# Patient Record
Sex: FEMALE | Race: BLACK OR AFRICAN AMERICAN | NOT HISPANIC OR LATINO | Employment: OTHER | URBAN - METROPOLITAN AREA
[De-identification: names, ages, dates, MRNs, and addresses within clinical notes are randomized per-mention and may not be internally consistent; named-entity substitution may affect disease eponyms.]

---

## 2017-01-15 ENCOUNTER — HOSPITAL ENCOUNTER (EMERGENCY)
Facility: HOSPITAL | Age: 47
Discharge: HOME | End: 2017-01-16
Attending: EMERGENCY MEDICINE
Payer: MEDICARE

## 2017-01-15 DIAGNOSIS — Z51.89 VISIT FOR WOUND CHECK: Primary | ICD-10-CM

## 2017-01-15 PROCEDURE — 99283 EMERGENCY DEPT VISIT LOW MDM: CPT

## 2017-01-15 NOTE — ED AVS SNAPSHOT
[facility]  [address] [facility] [address]               Karine Nayak   1/15/2017 11:41 PM   ED    Description:  Female : 1970   Department:  [facility]           Your Care was Coordinated By:     Provider Role From To    Cecil Burgos MD Attending Provider 01/15/17 2437 --      Reason for Visit     chest tube came out           Diagnoses this Visit        Comments    Visit for wound check    -  Primary       ED Disposition     ED Disposition Condition Comment    Discharge             To Do List           Follow-up Information     Schedule an appointment as soon as possible for a visit with Malinda Byrd NP.    Specialty:  Family Medicine    Why:  Follow up with surgery regarding drainage tube      Contact information:    [address]  [facility] [address]  [phone]        [facility]     [facility] Care [facility] -  Assistance  Registered nurses in the [facility] provide clinical advisement, health education, appointment booking, and other advisory services.  Call for this free service at [phone].             Medications           Message regarding Medications     Verify the changes and/or additions to your medication regime listed below are the same as discussed with your clinician today.  If any of these changes or additions are incorrect, please notify your healthcare provider.             Verify that the below list of medications is an accurate representation of the medications you are currently taking.  If none reported, the list may be blank. If incorrect, please contact your healthcare provider. Carry this list with you in case of emergency.           Current Medications     acetaminophen-codeine 300-60mg (TYLENOL #4) 300-60 mg Tab TAKE ONE TABLET BY MOUTH THREE TIMES DAILY AS NEEDED    alprazolam (XANAX) 1 MG tablet Take 1 tablet (1 mg total) by mouth every 8 (eight) hours.    aripiprazole  "(ABILIFY) 10 MG Tab TAKE ONE TABLET BY MOUTH ONCE A DAY    atorvastatin (LIPITOR) 20 MG tablet Take 20 mg by mouth once daily.    BENICAR HCT 40-25 mg per tablet Take 1 tablet by mouth once daily.    duloxetine (CYMBALTA) 60 MG capsule TAKE ONE CAPSULE BY MOUTH ONCE A DAY    gabapentin (NEURONTIN) 300 MG capsule Take 1 capsule (300 mg total) by mouth every evening.    iron ps cmplx-folic acid-vit C (NOVAFERRUM) 100-1-60 mg/5 mL SolR Take 5 mLs by mouth once daily.    levocetirizine (XYZAL) 5 MG tablet Take 1 tablet by mouth once daily.    meloxicam (MOBIC) 15 MG tablet TAKE ONE TABLET BY MOUTH DAILY    spironolactone (ALDACTONE) 50 MG tablet Take 1 tablet by mouth once daily.    tizanidine (ZANAFLEX) 4 MG tablet TAKE ONE TABLET BY MOUTH EVERY 8 HOURS           Clinical Reference Information           Your Vitals Were     BP Pulse Temp Resp Height Weight    125/61 (BP Location: Left arm, Patient Position: Sitting) 103 97.5 °F (36.4 °C) (Oral) 20 5' 8" (1.727 m) 143.3 kg (316 lb)    Last Period SpO2 BMI          01/30/2013 100% 48.05 kg/m2        Allergies as of 1/16/2017     No Known Allergies      Immunizations Administered on Date of Encounter - 1/16/2017     None      ED Micro, Lab, POCT     None      ED Imaging Orders     Start Ordered       Status Ordering Provider    01/15/17 2346 01/15/17 2345  X-Ray Chest PA And Lateral  1 time imaging      In process         Discharge Instructions         Wound Care    You have a break in the skin. This wound may be because of an injury. Or it may be the result of surgery. Closing the wound helps stop bleeding, protects the wound from infection, and speeds healing. The type of closure that is used depends on the size and location of the wound. Choices include stitches (sutures), strips of surgical tape, skin glue, or staples.  Home care  Your healthcare provider may prescribe medicines for pain. Or he or she may suggest an over-the-counter (OTC) pain reliever, such as " ibuprofen. If you have chronic kidney disease, talk with your provider before taking any OTC medicines. Also talk with your provider if you've had a stomach ulcer or gastrointestinal bleeding. In certain cases, antibiotics may be prescribed to help prevent infection. If antibiotics are prescribed, take them exactly as directed for as long as directed. Do not stop taking your antibiotics until they are all gone, even if you feel better.  General care  · Follow the healthcare providers instructions on how to care for the wound.  · Wash your hands with soap and warm water before and after caring for the wound. This helps prevent infection.  · If a bandage was applied, change it once a day or as directed. If at any time the bandage becomes wet or dirty, replace it with a new one.  · Unless told otherwise, avoid soaking the wound in water. Take showers or sponge baths instead of tub baths. Don't scrub or pick at the wound.  · Don't go swimming.  · If you have a bandage and it gets wet, use a clean cloth to gently pat the wound dry. Then replace the bandage with a dry one.  · Don't scratch, rub, or pick at the area.  · Watch for the signs of infection listed below. Any wound can get infected, even if you are taking antibiotics. Seek care right away if you see any possible signs of infection.  Care for specific closures  · Sutures. You may want to clean the wound daily after the first 2 to 3 days. To do this, remove the bandage and gently wash the area with soap and warm water. After cleaning, apply a thin layer of antibiotic ointment if recommended. Then apply a new bandage. Sutures on the outside of the skin usually need to be removed by your healthcare provider.  · Surgical tape. Keep the area dry. If it gets wet, blot it dry with a towel. Surgical tape closures usually fall off within 7 to 10 days. If they have not fallen off after 10 days, you can remove them yourself. To remove the tape, use mineral oil or petroleum  jelly on a cotton ball to gently rub the adhesive.  · Skin glue. You may shower or bathe as usual, but do not use soaps, lotions, or ointments on the wound area. Do not scrub the wound. After bathing, pat the wound dry with a soft towel. Do not apply liquids like peroxide, ointments, or creams to the wound while the strips or film is in place. Don't scratch, rub, or pick at the strips or film. Don't put tape directly over the strips or film. Skin adhesive film will fall off naturally in 5 to 10 days. If it does not peel off in 10 days, gently rub petroleum jelly or an ointment onto the film.  · Staples. Take showers or sponge baths. Don't take tub baths. Don't use lotions on the wound area. The area may be cleaned with soap and water 2 to 3 days after the wound was stapled. Don't scrub the wound. Pat it dry with a clean soft cloth or towel. You can use antibiotic ointment if your provider tells you to. Staples will need to be removed by your healthcare provider in 10 to 14 days.  Follow-up care  Follow up with your healthcare provider, or as directed. If you have sutures or staples, return for their removal as directed.  When to seek medical advice  Call your healthcare provider right away if you have signs of infection:  · Fever of 100.4°F (38°C) or higher, or as directed by your healthcare provider  · Increasing pain in the wound  · Increasing redness or swelling  · Pus or bad-smelling drainage from the wound  Also call your provider right away if any of these occur:  · Wound bleeds more than a small amount or wont stop bleeding  · Wound edges come apart  · Numbness or weakness in the wound area that doesnt go away  © 3781-4152 [employer]. [address] [place] [address], [place] [address]. All rights reserved. This information is not intended as a substitute for professional medical care. Always follow your healthcare professional's instructions.          [facility] Sign-Up     Activating your [facility]  account is as easy as 1-2-3!     1) Visit [email or web address], select Sign Up Now, enter this activation code and your date of birth, then select Next.  [email or web address]  Expires: 3/2/2017 12:11 AM      2) Create a username and password to use when you visit [facility] in the future and select a security question in case you lose your password and select Next.    3) Enter your e-mail address and click Sign Up!    Additional Information  If you have questions, please e-mail [email or web address] or call [phone] to talk to our [facility] staff. Remember, [facility] is NOT to be used for urgent needs. For medical emergencies, dial 911.         Smoking Cessation     If you would like to quit smoking:   You may be eligible for free services if you are a [place] resident and started smoking cigarettes before September 1, 1988.  Call the Smoking Cessation [employer] ([facility]) toll free at (714) 166-8619 or (530) 864-9088.   Call 0-024-QUIT-NOW if you do not meet the above criteria.             [facility] complies with applicable Federal civil rights laws and does not discriminate on the basis of race, color, national origin, age, disability, or sex.        Language Assistance Services     ATTENTION: Language assistance services are available, free of charge. Please call [phone].      ATENCIÓN: Si habla español, tiene a maria disposición servicios gratuitos de asistencia lingüística. Llame al [phone].     CHÚ Ý: N?u b?n nói Ti?ng Vi?t, có các d?ch v? h? tr? ngôn ng? mi?n phí dành cho b?n. G?i s? [phone].

## 2017-01-16 VITALS
HEART RATE: 89 BPM | SYSTOLIC BLOOD PRESSURE: 128 MMHG | BODY MASS INDEX: 44.41 KG/M2 | TEMPERATURE: 97 F | RESPIRATION RATE: 18 BRPM | DIASTOLIC BLOOD PRESSURE: 68 MMHG | HEIGHT: 68 IN | OXYGEN SATURATION: 100 % | WEIGHT: 293 LBS

## 2017-01-16 NOTE — DISCHARGE INSTRUCTIONS
Wound Care    You have a break in the skin. This wound may be because of an injury. Or it may be the result of surgery. Closing the wound helps stop bleeding, protects the wound from infection, and speeds healing. The type of closure that is used depends on the size and location of the wound. Choices include stitches (sutures), strips of surgical tape, skin glue, or staples.  Home care  Your healthcare provider may prescribe medicines for pain. Or he or she may suggest an over-the-counter (OTC) pain reliever, such as ibuprofen. If you have chronic kidney disease, talk with your provider before taking any OTC medicines. Also talk with your provider if you've had a stomach ulcer or gastrointestinal bleeding. In certain cases, antibiotics may be prescribed to help prevent infection. If antibiotics are prescribed, take them exactly as directed for as long as directed. Do not stop taking your antibiotics until they are all gone, even if you feel better.  General care  · Follow the healthcare providers instructions on how to care for the wound.  · Wash your hands with soap and warm water before and after caring for the wound. This helps prevent infection.  · If a bandage was applied, change it once a day or as directed. If at any time the bandage becomes wet or dirty, replace it with a new one.  · Unless told otherwise, avoid soaking the wound in water. Take showers or sponge baths instead of tub baths. Don't scrub or pick at the wound.  · Don't go swimming.  · If you have a bandage and it gets wet, use a clean cloth to gently pat the wound dry. Then replace the bandage with a dry one.  · Don't scratch, rub, or pick at the area.  · Watch for the signs of infection listed below. Any wound can get infected, even if you are taking antibiotics. Seek care right away if you see any possible signs of infection.  Care for specific closures  · Sutures. You may want to clean the wound daily after the first 2 to 3 days. To do  this, remove the bandage and gently wash the area with soap and warm water. After cleaning, apply a thin layer of antibiotic ointment if recommended. Then apply a new bandage. Sutures on the outside of the skin usually need to be removed by your healthcare provider.  · Surgical tape. Keep the area dry. If it gets wet, blot it dry with a towel. Surgical tape closures usually fall off within 7 to 10 days. If they have not fallen off after 10 days, you can remove them yourself. To remove the tape, use mineral oil or petroleum jelly on a cotton ball to gently rub the adhesive.  · Skin glue. You may shower or bathe as usual, but do not use soaps, lotions, or ointments on the wound area. Do not scrub the wound. After bathing, pat the wound dry with a soft towel. Do not apply liquids like peroxide, ointments, or creams to the wound while the strips or film is in place. Don't scratch, rub, or pick at the strips or film. Don't put tape directly over the strips or film. Skin adhesive film will fall off naturally in 5 to 10 days. If it does not peel off in 10 days, gently rub petroleum jelly or an ointment onto the film.  · Staples. Take showers or sponge baths. Don't take tub baths. Don't use lotions on the wound area. The area may be cleaned with soap and water 2 to 3 days after the wound was stapled. Don't scrub the wound. Pat it dry with a clean soft cloth or towel. You can use antibiotic ointment if your provider tells you to. Staples will need to be removed by your healthcare provider in 10 to 14 days.  Follow-up care  Follow up with your healthcare provider, or as directed. If you have sutures or staples, return for their removal as directed.  When to seek medical advice  Call your healthcare provider right away if you have signs of infection:  · Fever of 100.4°F (38°C) or higher, or as directed by your healthcare provider  · Increasing pain in the wound  · Increasing redness or swelling  · Pus or bad-smelling drainage  from the wound  Also call your provider right away if any of these occur:  · Wound bleeds more than a small amount or wont stop bleeding  · Wound edges come apart  · Numbness or weakness in the wound area that doesnt go away  © 9245-4748 [employer]. [address] [place] [address]. All rights reserved. This information is not intended as a substitute for professional medical care. Always follow your healthcare professional's instructions.

## 2017-01-16 NOTE — ED PROVIDER NOTES
Encounter Date: 1/15/2017       History     Chief Complaint   Patient presents with    chest tube came out     Left chest wall x 1 hours ago; denies SOB     Review of patient's allergies indicates:  No Known Allergies  HPI Comments: Patient states drainage tube accidental was removed.    Patient is a 46 y.o. female presenting with the following complaint: injury. The history is provided by the patient.   General Injury    The incident occurred just prior to arrival. The incident occurred at home. The injury mechanism is unknown. She came to the ER via by private vehicle. Pertinent negatives include no chest pain, no abdominal pain, no nausea, no vomiting, no weakness and no cough.     Past Medical History   Diagnosis Date    Anxiety     Fibromyalgia     Hyperlipidemia     Hypertension     OCD (obsessive compulsive disorder)     Sycosis      No past medical history pertinent negatives.  Past Surgical History   Procedure Laterality Date    Tubal ligation      Tonsillectomy      Hysterectomy      Bladder lift      Knee surgery Right      Family History   Problem Relation Age of Onset    Hypertension Mother     Mental illness Father     COPD Father     Cancer Father      Social History   Substance Use Topics    Smoking status: Current Every Day Smoker     Packs/day: 0.50     Types: Cigarettes    Smokeless tobacco: Never Used    Alcohol use No     Review of Systems   Constitutional: Negative for fever.   HENT: Negative for ear discharge, ear pain and facial swelling.    Eyes: Negative for pain, discharge, redness and itching.   Respiratory: Negative for cough, shortness of breath, wheezing and stridor.    Cardiovascular: Negative for chest pain, palpitations and leg swelling.   Gastrointestinal: Negative for abdominal pain, nausea and vomiting.   Genitourinary: Negative for flank pain and frequency.   Musculoskeletal: Negative for arthralgias, back pain and gait problem.   Neurological: Negative for  tremors, syncope, speech difficulty and weakness.       Physical Exam   Initial Vitals   BP Pulse Resp Temp SpO2   01/15/17 2334 01/15/17 2334 01/15/17 2334 01/15/17 2334 01/15/17 2334   125/61 103 20 97.5 °F (36.4 °C) 100 %     Physical Exam    Nursing note and vitals reviewed.  Constitutional: She appears well-developed and well-nourished. She is not diaphoretic. No distress.   HENT:   Head: Normocephalic and atraumatic.   Mouth/Throat: No oropharyngeal exudate.   Eyes: Conjunctivae and EOM are normal. Pupils are equal, round, and reactive to light. Right eye exhibits no discharge. Left eye exhibits no discharge. No scleral icterus.   Neck: Normal range of motion. Neck supple. No JVD present.   Cardiovascular: Normal rate, regular rhythm and normal heart sounds. Exam reveals no friction rub.    Pulmonary/Chest: Breath sounds normal. No stridor. No respiratory distress. She has no wheezes. She has no rhonchi. She has no rales.   Abdominal: Soft. Bowel sounds are normal. She exhibits no distension. There is no tenderness. There is no rebound and no guarding.   Musculoskeletal: Normal range of motion. She exhibits no edema or tenderness.   Neurological: She is alert and oriented to person, place, and time. She has normal strength and normal reflexes. No sensory deficit.         ED Course   Procedures  Labs Reviewed - No data to display                            ED Course     Clinical Impression:   The encounter diagnosis was Visit for wound check.    Disposition:   Disposition: Discharged  Condition: Stable       Cecil Burgos MD  01/16/17 0007

## 2017-03-08 ENCOUNTER — LAB VISIT (OUTPATIENT)
Dept: LAB | Facility: HOSPITAL | Age: 47
End: 2017-03-08
Attending: INTERNAL MEDICINE
Payer: MEDICARE

## 2017-03-08 DIAGNOSIS — N17.9 ACUTE KIDNEY FAILURE, UNSPECIFIED: Primary | ICD-10-CM

## 2017-03-08 DIAGNOSIS — E55.9 UNSPECIFIED VITAMIN D DEFICIENCY: ICD-10-CM

## 2017-03-08 LAB
ALBUMIN SERPL BCP-MCNC: 3.1 G/DL
ALP SERPL-CCNC: 64 U/L
ALT SERPL W/O P-5'-P-CCNC: 13 U/L
ANION GAP SERPL CALC-SCNC: 12 MMOL/L
AST SERPL-CCNC: 10 U/L
BASOPHILS # BLD AUTO: 0.03 K/UL
BASOPHILS NFR BLD: 0.3 %
BILIRUB SERPL-MCNC: 0.6 MG/DL
BUN SERPL-MCNC: 19 MG/DL
CALCIUM SERPL-MCNC: 10.6 MG/DL
CHLORIDE SERPL-SCNC: 101 MMOL/L
CO2 SERPL-SCNC: 24 MMOL/L
CREAT SERPL-MCNC: 1.5 MG/DL
DIFFERENTIAL METHOD: ABNORMAL
EOSINOPHIL # BLD AUTO: 0.1 K/UL
EOSINOPHIL NFR BLD: 1 %
ERYTHROCYTE [DISTWIDTH] IN BLOOD BY AUTOMATED COUNT: 16.8 %
EST. GFR  (AFRICAN AMERICAN): 48 ML/MIN/1.73 M^2
EST. GFR  (NON AFRICAN AMERICAN): 41 ML/MIN/1.73 M^2
GLUCOSE SERPL-MCNC: 105 MG/DL
HCT VFR BLD AUTO: 30.3 %
HGB BLD-MCNC: 10.1 G/DL
LYMPHOCYTES # BLD AUTO: 2.9 K/UL
LYMPHOCYTES NFR BLD: 29.3 %
MCH RBC QN AUTO: 26.4 PG
MCHC RBC AUTO-ENTMCNC: 33.3 %
MCV RBC AUTO: 79 FL
MONOCYTES # BLD AUTO: 0.7 K/UL
MONOCYTES NFR BLD: 7.2 %
NEUTROPHILS # BLD AUTO: 6.1 K/UL
NEUTROPHILS NFR BLD: 62.2 %
PHOSPHATE SERPL-MCNC: 3.3 MG/DL
PLATELET # BLD AUTO: 455 K/UL
PMV BLD AUTO: 9.7 FL
POTASSIUM SERPL-SCNC: 4.2 MMOL/L
PROT SERPL-MCNC: 8.5 G/DL
RBC # BLD AUTO: 3.82 M/UL
SODIUM SERPL-SCNC: 137 MMOL/L
WBC # BLD AUTO: 9.87 K/UL

## 2017-03-08 PROCEDURE — 84100 ASSAY OF PHOSPHORUS: CPT

## 2017-03-08 PROCEDURE — 83970 ASSAY OF PARATHORMONE: CPT

## 2017-03-08 PROCEDURE — 82652 VIT D 1 25-DIHYDROXY: CPT

## 2017-03-08 PROCEDURE — 36415 COLL VENOUS BLD VENIPUNCTURE: CPT

## 2017-03-08 PROCEDURE — 85025 COMPLETE CBC W/AUTO DIFF WBC: CPT

## 2017-03-08 PROCEDURE — 80053 COMPREHEN METABOLIC PANEL: CPT

## 2017-03-09 LAB — PTH-INTACT SERPL-MCNC: 36 PG/ML

## 2017-03-13 LAB — 1,25(OH)2D3 SERPL-MCNC: 21 PG/ML

## 2017-06-14 ENCOUNTER — LAB VISIT (OUTPATIENT)
Dept: LAB | Facility: HOSPITAL | Age: 47
End: 2017-06-14
Attending: INTERNAL MEDICINE
Payer: MEDICARE

## 2017-06-14 DIAGNOSIS — N17.9 ACUTE KIDNEY FAILURE, UNSPECIFIED: Primary | ICD-10-CM

## 2017-06-14 DIAGNOSIS — E55.9 VITAMIN D DEFICIENCY: ICD-10-CM

## 2017-06-14 PROBLEM — J92.9 PLEURAL THICKENING: Status: ACTIVE | Noted: 2017-06-14

## 2017-06-14 PROBLEM — M79.2 NEUROPATHIC PAIN OF CHEST: Status: ACTIVE | Noted: 2017-06-14

## 2017-06-14 PROBLEM — R07.81 PLEURITIC CHEST PAIN: Status: ACTIVE | Noted: 2017-06-14

## 2017-06-14 LAB
25(OH)D3+25(OH)D2 SERPL-MCNC: 15 NG/ML
ALBUMIN SERPL BCP-MCNC: 3.5 G/DL
ALP SERPL-CCNC: 71 U/L
ALT SERPL W/O P-5'-P-CCNC: 22 U/L
ANION GAP SERPL CALC-SCNC: 7 MMOL/L
AST SERPL-CCNC: 14 U/L
BASOPHILS # BLD AUTO: 0.02 K/UL
BASOPHILS NFR BLD: 0.2 %
BILIRUB SERPL-MCNC: 0.2 MG/DL
BILIRUB UR QL STRIP: NEGATIVE
BUN SERPL-MCNC: 26 MG/DL
CALCIUM SERPL-MCNC: 9.5 MG/DL
CHLORIDE SERPL-SCNC: 109 MMOL/L
CLARITY UR: CLEAR
CO2 SERPL-SCNC: 23 MMOL/L
COLOR UR: YELLOW
CREAT SERPL-MCNC: 1.3 MG/DL
DIFFERENTIAL METHOD: ABNORMAL
EOSINOPHIL # BLD AUTO: 0.3 K/UL
EOSINOPHIL NFR BLD: 2.6 %
ERYTHROCYTE [DISTWIDTH] IN BLOOD BY AUTOMATED COUNT: 13.9 %
EST. GFR  (AFRICAN AMERICAN): 57 ML/MIN/1.73 M^2
EST. GFR  (NON AFRICAN AMERICAN): 49 ML/MIN/1.73 M^2
GLUCOSE SERPL-MCNC: 87 MG/DL
GLUCOSE UR QL STRIP: NEGATIVE
HCT VFR BLD AUTO: 31.5 %
HGB BLD-MCNC: 10.6 G/DL
HGB UR QL STRIP: NEGATIVE
KETONES UR QL STRIP: NEGATIVE
LEUKOCYTE ESTERASE UR QL STRIP: NEGATIVE
LYMPHOCYTES # BLD AUTO: 2.9 K/UL
LYMPHOCYTES NFR BLD: 30.2 %
MCH RBC QN AUTO: 28.1 PG
MCHC RBC AUTO-ENTMCNC: 33.7 %
MCV RBC AUTO: 84 FL
MONOCYTES # BLD AUTO: 0.5 K/UL
MONOCYTES NFR BLD: 4.8 %
NEUTROPHILS # BLD AUTO: 6 K/UL
NEUTROPHILS NFR BLD: 62.2 %
NITRITE UR QL STRIP: NEGATIVE
PH UR STRIP: 6 [PH] (ref 5–8)
PLATELET # BLD AUTO: 373 K/UL
PMV BLD AUTO: 9.5 FL
POTASSIUM SERPL-SCNC: 4.1 MMOL/L
PROT SERPL-MCNC: 7.9 G/DL
PROT UR QL STRIP: NEGATIVE
RBC # BLD AUTO: 3.77 M/UL
SODIUM SERPL-SCNC: 139 MMOL/L
SP GR UR STRIP: 1.01 (ref 1–1.03)
URN SPEC COLLECT METH UR: NORMAL
UROBILINOGEN UR STRIP-ACNC: NEGATIVE EU/DL
WBC # BLD AUTO: 9.71 K/UL

## 2017-06-14 PROCEDURE — 85025 COMPLETE CBC W/AUTO DIFF WBC: CPT

## 2017-06-14 PROCEDURE — 82306 VITAMIN D 25 HYDROXY: CPT

## 2017-06-14 PROCEDURE — 80053 COMPREHEN METABOLIC PANEL: CPT

## 2017-06-14 PROCEDURE — 36415 COLL VENOUS BLD VENIPUNCTURE: CPT

## 2017-06-14 PROCEDURE — 81003 URINALYSIS AUTO W/O SCOPE: CPT

## 2017-12-13 ENCOUNTER — TELEPHONE (OUTPATIENT)
Dept: BARIATRICS | Facility: CLINIC | Age: 47
End: 2017-12-13

## 2017-12-13 NOTE — TELEPHONE ENCOUNTER
Spoke to patient: she states that she a gastric sleeve and is gaining weight.  She would to be considered for a revision.  Advised to get outside records from Dr Estevez.  She states that she had a complication after surgery and was in the hospital for 6 weeks.  Asked her to obtain these records and bring to her first appointment.  Appt made and slip mailed to patient.  All questions answered.

## 2017-12-13 NOTE — TELEPHONE ENCOUNTER
----- Message from Joana Chavez sent at 12/13/2017 10:56 AM CST -----  Contact: self   Hope States that she needs a call returned by a nurse in reference to having a revision surgery ,  Please call Karine @ [phone] . Thanks :)

## 2018-01-16 ENCOUNTER — TELEPHONE (OUTPATIENT)
Dept: BARIATRICS | Facility: CLINIC | Age: 48
End: 2018-01-16

## 2018-01-16 NOTE — TELEPHONE ENCOUNTER
Rescheduled patient with ARPAN Lopez for 2/8/2018 at 1120am.  Patient confirmed date and time. (Rescheduled due to ARPAN Lopez out for family emergency)

## 2018-02-08 ENCOUNTER — OFFICE VISIT (OUTPATIENT)
Dept: BARIATRICS | Facility: CLINIC | Age: 48
End: 2018-02-08
Payer: MEDICARE

## 2018-02-08 ENCOUNTER — TELEPHONE (OUTPATIENT)
Dept: BARIATRICS | Facility: CLINIC | Age: 48
End: 2018-02-08

## 2018-02-08 VITALS
HEIGHT: 68 IN | HEART RATE: 115 BPM | DIASTOLIC BLOOD PRESSURE: 72 MMHG | SYSTOLIC BLOOD PRESSURE: 130 MMHG | WEIGHT: 293 LBS | BODY MASS INDEX: 44.41 KG/M2

## 2018-02-08 DIAGNOSIS — F42.9 OBSESSIVE-COMPULSIVE DISORDER, UNSPECIFIED TYPE: ICD-10-CM

## 2018-02-08 DIAGNOSIS — F41.9 ANXIETY: ICD-10-CM

## 2018-02-08 DIAGNOSIS — M79.7 FIBROMYALGIA: ICD-10-CM

## 2018-02-08 DIAGNOSIS — Z98.84 S/P LAPAROSCOPIC SLEEVE GASTRECTOMY: ICD-10-CM

## 2018-02-08 DIAGNOSIS — R13.10 DYSPHAGIA, UNSPECIFIED TYPE: ICD-10-CM

## 2018-02-08 DIAGNOSIS — E78.5 HYPERLIPIDEMIA, UNSPECIFIED HYPERLIPIDEMIA TYPE: ICD-10-CM

## 2018-02-08 DIAGNOSIS — G47.33 OSA ON CPAP: ICD-10-CM

## 2018-02-08 DIAGNOSIS — R12 HEARTBURN: ICD-10-CM

## 2018-02-08 DIAGNOSIS — R63.5 WEIGHT GAIN: ICD-10-CM

## 2018-02-08 DIAGNOSIS — E66.01 MORBID OBESITY WITH BODY MASS INDEX OF 45.0-49.9 IN ADULT: ICD-10-CM

## 2018-02-08 DIAGNOSIS — E46 PROTEIN-CALORIE MALNUTRITION, UNSPECIFIED SEVERITY: ICD-10-CM

## 2018-02-08 DIAGNOSIS — Z86.2 HX OF IRON DEFICIENCY ANEMIA: ICD-10-CM

## 2018-02-08 DIAGNOSIS — E63.9: ICD-10-CM

## 2018-02-08 DIAGNOSIS — E66.01 MORBID OBESITY DUE TO EXCESS CALORIES: ICD-10-CM

## 2018-02-08 DIAGNOSIS — I10 ESSENTIAL HYPERTENSION: ICD-10-CM

## 2018-02-08 DIAGNOSIS — K21.9 GASTROESOPHAGEAL REFLUX DISEASE, ESOPHAGITIS PRESENCE NOT SPECIFIED: Primary | ICD-10-CM

## 2018-02-08 PROCEDURE — 99205 OFFICE O/P NEW HI 60 MIN: CPT | Mod: S$PBB,,, | Performed by: PHYSICIAN ASSISTANT

## 2018-02-08 PROCEDURE — 99999 PR PBB SHADOW E&M-EST. PATIENT-LVL IV: CPT | Mod: PBBFAC,,, | Performed by: PHYSICIAN ASSISTANT

## 2018-02-08 PROCEDURE — 99214 OFFICE O/P EST MOD 30 MIN: CPT | Mod: PBBFAC | Performed by: PHYSICIAN ASSISTANT

## 2018-02-08 RX ORDER — IRBESARTAN AND HYDROCHLOROTHIAZIDE 300; 12.5 MG/1; MG/1
1 TABLET, FILM COATED ORAL EVERY MORNING
COMMUNITY
Start: 2018-01-24 | End: 2020-06-10

## 2018-02-08 RX ORDER — HYDROCODONE BITARTRATE AND ACETAMINOPHEN 5; 325 MG/1; MG/1
1 TABLET ORAL EVERY 6 HOURS PRN
Status: ON HOLD | COMMUNITY
Start: 2018-01-18 | End: 2018-11-09 | Stop reason: HOSPADM

## 2018-02-08 RX ORDER — OMEPRAZOLE 40 MG/1
40 CAPSULE, DELAYED RELEASE ORAL
Qty: 60 CAPSULE | Refills: 12 | Status: ON HOLD | OUTPATIENT
Start: 2018-02-08 | End: 2018-11-09 | Stop reason: HOSPADM

## 2018-02-08 RX ORDER — FLUTICASONE PROPIONATE 50 MCG
1 SPRAY, SUSPENSION (ML) NASAL 2 TIMES DAILY PRN
COMMUNITY
Start: 2017-11-20 | End: 2020-11-04 | Stop reason: ALTCHOICE

## 2018-02-08 NOTE — TELEPHONE ENCOUNTER
Appointments made for patient including labs, cxr, ekg and Nutrition consult.  Confirmed dates and times.

## 2018-02-08 NOTE — TELEPHONE ENCOUNTER
----- Message from Roberta Montana PA-C sent at 2/8/2018  1:44 PM CST -----  Please schedule labs to coordinate with diet consult.    Thanksroberta

## 2018-02-08 NOTE — PROGRESS NOTES
"BARIATRIC NEW PATIENT EVALUATION    CHIEF COMPLAINT:   Morbid obesity Body mass index is 48.99 kg/m². s/p lap gastric sleeve 12/13/2016 with Dr Estevez at [facility]    HISTORY OF PRESENT ILLNESS:  Karine Nayak  is a 47 y.o. female presenting for morbid obesity Body mass index is 48.99 kg/m². and inability to lose weight with her gastric sleeve.  She complains that she continues to gain weight.  She reports that she tries to follow the bariatric diet plan indicating that she drinks protein shakes twice a day, eats 1 meal, and 2 high protein snacks.  She does not exercise regularly due to fibromyalgia.  She reports that she is addicted to cheese.  She reports that her post operative course was complicated and has copies of her operative report, and some hospital documentation.    Review of hospital documents: esophageal perforation on 12/14/16 confirmed with Gastrogaffin swallow and non-contrast CT of abdomen.  Pt was transferred to [facility] (no records from [facility] were received, scanned into media).  Was in hospital for 6 weeks after sleeve: perforated esophagus and a leak.  Pneumonia x2 months also. Saw PT and respiratory therapy.  Central line left a nasty scar JOHNSON chest.     She states that she has not seen a bariatric provider since about 3-6 months after her surgery.  She reports that the lowest weight was 277 pounds around 3-6 months after surgery.  She reprots that she is gaining since then.      Today she also c/o "bad acid reflux" with all solid meals.  She describes as substernal burning.  Food sticks and comes up: 3x's/ week. Not on anything.  Will take Tums from time to time. No H2 or PPI.      Last visit with previous bariatric provider:  Around 6 months (not comfortable with provider).     DIET: Cranbery john juice, 4 glasses/ day.  Some CL.  No plain water.  Drinks everything with a flavor.    24 hr recall:   Bf- premier RTD  Snack- cheddar cheese blocks  Johnson- premier RTD  Snack- yogurt " (Oikos 000)  DI- meat and veggies mainly (avoids rice)    PAST MEDICAL HISTORY:  Past Medical History:   Diagnosis Date    SUZIE (acute kidney injury)     Anxiety     Fibromyalgia     Hyperlipidemia     Hypertension     Inflammatory osteoarthritis     OCD (obsessive compulsive disorder)     Rectocele     Scoliosis     Sycosis     Thyroid disease          PAST SURGICAL HISTORY:  Past Surgical History:   Procedure Laterality Date    bladder lift      HYSTERECTOMY      KNEE SURGERY Right     TONSILLECTOMY      TUBAL LIGATION         FAMILY HISTORY:  Family History   Problem Relation Age of Onset    Hypertension Mother     Mental illness Father     COPD Father     Cancer Father        SOCIAL HISTORY:  Social History     Social History    Marital status:      Spouse name: N/A    Number of children: N/A    Years of education: N/A     Occupational History    Not on file.     Social History Main Topics    Smoking status: Former Smoker     Packs/day: 0.50     Types: Cigarettes, Vaping with nicotine, Vaping w/o nicotine     Start date: 2001     Quit date: 2016    Smokeless tobacco: Never Used    Alcohol use No    Drug use: No    Sexual activity: Not on file     Other Topics Concern    Not on file     Social History Narrative    No narrative on file       MEDICATIONS:  Current Outpatient Prescriptions   Medication Sig Dispense Refill    alprazolam (XANAX) 1 MG tablet Take 1 tablet (1 mg total) by mouth every 8 (eight) hours. 90 tablet 5    aripiprazole (ABILIFY) 10 MG Tab TAKE ONE TABLET BY MOUTH ONCE A DAY 30 tablet 5    BENICAR HCT 40-25 mg per tablet Take 1 tablet by mouth once daily.      calcium citrate (CALCITRATE) 200 mg (950 mg) tablet Take 1 tablet by mouth once daily.      cyanocobalamin (VITAMIN B-12) 100 MCG tablet Take 100 mcg by mouth once daily.      cyclobenzaprine (FLEXERIL) 10 MG tablet Take 1 tablet by mouth 2 (two) times daily.      duloxetine (CYMBALTA) 60 MG  capsule TAKE ONE CAPSULE BY MOUTH ONCE A DAY 30 capsule 3    fluticasone (FLONASE) 50 mcg/actuation nasal spray       gabapentin (NEURONTIN) 400 MG capsule Take 1 capsule by mouth 2 (two) times daily.      hydrocodone-acetaminophen 5-325mg (NORCO) 5-325 mg per tablet       hydrocodone-acetaminophen 7.5-325mg (NORCO) 7.5-325 mg per tablet Take 1 tablet by mouth 3 (three) times daily.      irbesartan-hydrochlorothiazide (AVALIDE) 300-12.5 mg per tablet       levocetirizine (XYZAL) 5 MG tablet Take 1 tablet by mouth once daily.      promethazine-dextromethorphan (PROMETHAZINE-DM) 6.25-15 mg/5 mL Syrp Take 5 mLs by mouth every 6 (six) hours as needed.  5    spironolactone (ALDACTONE) 100 MG tablet Take 1 tablet by mouth Daily.      vitamin D 1000 units Tab Take 1,000 Units by mouth once daily.      atorvastatin (LIPITOR) 20 MG tablet Take 20 mg by mouth once daily.      iron ps cmplx-folic acid-vit C (NOVAFERRUM) 100-1-60 mg/5 mL SolR Take 5 mLs by mouth once daily. 120 mL 3    lidocaine (LIDODERM) 5 % Place 1 patch onto the skin once daily. Remove & Discard patch within 12 hours or as directed by MD 30 patch 3     No current facility-administered medications for this visit.        ALLERGIES:  Review of patient's allergies indicates:  No Known Allergies    ROS:  Review of Systems   Constitutional: Negative for chills, fever, malaise/fatigue and weight loss.   Eyes: Negative for blurred vision, double vision and pain.   Respiratory: Negative for cough, shortness of breath and wheezing.    Cardiovascular: Negative for chest pain, palpitations and leg swelling.   Gastrointestinal: Positive for heartburn. Negative for abdominal pain, blood in stool, constipation, diarrhea, melena, nausea and vomiting.   Genitourinary: Negative for dysuria, frequency and hematuria.   Musculoskeletal: Positive for back pain, joint pain and myalgias. Negative for falls and neck pain.   Skin: Negative for itching and rash.  "  Neurological: Negative for dizziness, tingling, focal weakness, weakness and headaches.   Psychiatric/Behavioral: Negative for depression and suicidal ideas. The patient is nervous/anxious.        PE:  Vitals:    02/08/18 1137   BP: 130/72   Pulse: (!) 115   Weight: (!) 144 kg (317 lb 7.4 oz)   Height: 5' 7.5" (1.715 m)       Physical Exam   Constitutional: She is oriented to person, place, and time. She appears well-developed and well-nourished. No distress.   Morbidly obese   HENT:   Head: Normocephalic and atraumatic.   Eyes: Conjunctivae are normal. Right eye exhibits no discharge. Left eye exhibits no discharge. No scleral icterus.   Neck: Neck supple.   Cardiovascular: Normal rate, regular rhythm and normal heart sounds.  Exam reveals no gallop and no friction rub.    No murmur heard.  Pulmonary/Chest: Effort normal and breath sounds normal. No respiratory distress. She has no wheezes. She has no rales.   Abdominal: Soft. Bowel sounds are normal. She exhibits no distension and no mass. There is no tenderness. There is no rebound and no guarding. No hernia.   Musculoskeletal: Normal range of motion. She exhibits no edema.   Neurological: She is alert and oriented to person, place, and time.   Skin: Skin is warm, dry and intact. No rash noted. She is not diaphoretic. No erythema. No pallor.   Dry skin noted on abdomen   Psychiatric: She has a normal mood and affect. Her speech is normal and behavior is normal. Judgment and thought content normal.   Nursing note and vitals reviewed.       DIAGNOSIS:  1. Morbid with Body mass index is 48.99 kg/m². and inability to lose weight.  2. Co-morbidities: MICHELINE, essential hypertension, hyperlipidemia, and chronic pain.  3. Fibromyalgia and OCD: abilify, Xanax, and cymbalta  4. Current tobacco use (when her  is out of town).     PLAN:  The patient is not a candidate for Bariatric Surgery at this time due to daily tobacco use.  She will need to quit smoking and have a " negative nicotine test prior to having revision.  She has been provider with smoking cessation packets to call and schedule an appointment.  She has signed the smoking cessation contract and it will be scanned in the media tab.      If she is able to quit smoking and complete the work up listed below, she may become a good candidate.  she is interested in revision of gastric sleeve to Dennis-en-Y with Dr. Hoskins. The surgery and post-op care were discussed in detail with the patient. All questions were answered.    she understands that bariatric surgery is a tool to aid in weight loss and that she needs to be committed to the diet and exercise post-operatively for successful weight loss.  Discussed expected weight loss outcomes after surgery which is 10% of the excess weight on her frame.  Goal weight is 285#s.  Discussed with patient that bariatric surgery is not the easy way out and that it will take plenty of dedication on the patient's part to be successful. Also discussed the possibility of weight regain if the patient strays from the diet guidelines or exercise requirements. Patient verbalized understanding and wishes to proceed with the work-up.    Patient willing to sign blood consent: Yes    ORDERS:  1. Chest X-Ray, EKG, EGD and PFT  2. Psychological Consult, Bariatric Dietician Consult and PCP Clearance  3. Bariatric Labs: BMP, CBC, Folate Serum, H. pylori, HgA1C, Hepatic Panel/LFT, Iron & TIBC, Lipid Profile, Magnesium, Phosphate, T3, T4, TSH, Free T4, Vitamin B12, and Vitamin B1.  4. 6 month MSD.  5. Quit tobacco products  6. Begin omeprazole 40 mg BID for now.  Will consider decreasing to once daily once/ if her symptoms subside.  If symptoms do not improve, will consider adding zantac BID and carafate QID pending results of EGD.   7. The patient was given a copy of nutrition booklet. I reviewed denson points with her, including following a high-protein diet with multiple small meals daily, low  carbohydrates, no snacking, and avoiding sugary foods.  8. Reviewed appropriate vitamin supplementation, including complete multi-vitamin with iron and B complex twice daily, calcium citrate 1500 mg daily in split doses, and daily sublingual vitamin B12 supplement.  9. Encouraged the patient to begin regular exercise, as this is an important part of weight loss and weight loss maintenance.    Primary Physician: Claritza Kingsley NP  RTC: As scheduled.    60 minute visit, over 50% of time spent counseling patient face to face on surgical options, risks, benefits, expected diet, recommended exercise regimen, and expected weight loss.

## 2018-02-08 NOTE — PATIENT INSTRUCTIONS
Take 1 B Complex daily      Sublingual B12 500 mcg daily, 1000 mcg every other day, 2500 mcg, and 5000 mcg weekly.

## 2018-02-08 NOTE — LETTER
"  [facility]  [address]  [facility] [address]  Phone: [phone]  Fax: [phone] February 8, 2018      Claritza Kingsley, CANDACE  [id number]  [facility] [address]    Patient: Karine Nayak   MR Number: [id number]   YOB: 1970   Date of Visit: 2/8/2018     Dear Ms. Kingsley:    Thank you for referring Karine Nayak to me for evaluation. Attached you will find relevant portions of my assessment and plan of care.    Karine Nayak  is a 47-year-old female presenting for morbid obesity Body mass index is 48.99 kg/m², and inability to lose weight with her gastric sleeve.  She complains that she continues to gain weight.  She reports that she tries to follow the bariatric diet plan indicating that she drinks protein shakes twice a day, eats one meal, and two high protein snacks.  She does not exercise regularly due to fibromyalgia.  She reports that she is addicted to cheese.  She reports that her post operative course was complicated and has copies of her operative report, and some hospital documentation.  Review of hospital documents: esophageal perforation on 12/14/16 confirmed with Gastrogaffin swallow and non-contrast CT of abdomen. Patient was transferred to [facility] (no records from [facility] were received, scanned into media). Was in hospital for 6 weeks after sleeve: perforated esophagus and a leak.  Pneumonia x 2 months also. Saw PT and respiratory therapy. Central line left a nasty scar JOHNSON chest.     She states that she has not seen a Bariatric provider since about 3-6 months after her surgery.  She reports that the lowest weight was 277 pounds around 3-6 months after surgery.  She reports that she is gaining since then.       Today she also complaints of "bad acid reflux" with all solid meals. She describes as substernal burning.  Food sticks and comes up: 3x's/ week. Not on anything.  Will take Tums from time to time. No H2 or PPI.      DIAGNOSIS:  1. Morbid with Body mass " index is 48.99 kg/m². and inability to lose weight.  2. Co-morbidities: MICHELINE, essential hypertension, hyperlipidemia, and chronic pain.  3. Fibromyalgia and OCD: Abilify, Xanax, and Cymbalta  4. Current tobacco use (when her  is out of town).   PLAN:  The patient is not a candidate for Bariatric Surgery at this time due to daily tobacco use.  She will need to quit smoking and have a negative nicotine test prior to having revision.  She has been provider with smoking cessation packets to call and schedule an appointment.  She has signed the smoking cessation contract and it will be scanned in the media tab.       If she is able to quit smoking and complete the work up listed below, she may become a good candidate.  she is interested in revision of gastric sleeve to Dennis-en-Y with Dr. Hoskins. The surgery and post-op care were discussed in detail with the patient. All questions were answered.     She understands that bariatric surgery is a tool to aid in weight loss and that she needs to be committed to the diet and exercise post-operatively for successful weight loss.  Discussed expected weight loss outcomes after surgery which is 10% of the excess weight on her frame.  Goal weight is 285#s.  Discussed with patient that bariatric surgery is not the easy way out and that it will take plenty of dedication on the patient's part to be successful. Also discussed the possibility of weight regain if the patient strays from the diet guidelines or exercise requirements. Patient verbalized understanding and wishes to proceed with the work-up.     Patient willing to sign blood consent: Yes     ORDERS:  1. Chest X-Ray, EKG, EGD and PFT  2. Psychological Consult, Bariatric Dietician Consult and PCP Clearance  3. Bariatric Labs: BMP, CBC, Folate Serum, H. pylori, HgA1C, Hepatic Panel/LFT, Iron & TIBC, Lipid Profile, Magnesium, Phosphate, T3, T4, TSH, Free T4, Vitamin B12, and Vitamin B1.  4. 6 month MSD.  5. Quit tobacco  products  6. Begin omeprazole 40 mg BID for now.  Will consider decreasing to once daily once/ if her symptoms subside.  If symptoms do not improve, will consider adding zantac BID and Carafate QID pending results of EGD.   7. The patient was given a copy of nutrition booklet. I reviewed denson points with her, including following a high-protein diet with multiple small meals daily, low carbohydrates, no snacking, and avoiding sugary foods.  8. Reviewed appropriate vitamin supplementation, including complete multi-vitamin with iron and B complex twice daily, calcium citrate 1500 mg daily in split doses, and daily sublingual vitamin B12 supplement.  9. Encouraged the patient to begin regular exercise, as this is an important part of weight loss and weight loss maintenance.    If you have questions, please do not hesitate to call me. I look forward to following Karine Nayak along with you.  Sincerely,      Laurel Montana PA-C  Section of Bariatric Surgery  [facility]    PHAM/piper

## 2018-02-15 ENCOUNTER — ANESTHESIA EVENT (OUTPATIENT)
Dept: ENDOSCOPY | Facility: HOSPITAL | Age: 48
End: 2018-02-15
Payer: MEDICARE

## 2018-02-15 ENCOUNTER — HOSPITAL ENCOUNTER (OUTPATIENT)
Facility: HOSPITAL | Age: 48
Discharge: HOME | End: 2018-02-15
Attending: INTERNAL MEDICINE | Admitting: INTERNAL MEDICINE
Payer: MEDICARE

## 2018-02-15 ENCOUNTER — ANESTHESIA (OUTPATIENT)
Dept: ENDOSCOPY | Facility: HOSPITAL | Age: 48
End: 2018-02-15
Payer: MEDICARE

## 2018-02-15 ENCOUNTER — SURGERY (OUTPATIENT)
Age: 48
End: 2018-02-15

## 2018-02-15 VITALS
BODY MASS INDEX: 44.41 KG/M2 | HEIGHT: 68 IN | WEIGHT: 293 LBS | DIASTOLIC BLOOD PRESSURE: 61 MMHG | RESPIRATION RATE: 18 BRPM | OXYGEN SATURATION: 98 % | HEART RATE: 89 BPM | SYSTOLIC BLOOD PRESSURE: 109 MMHG | TEMPERATURE: 98 F

## 2018-02-15 DIAGNOSIS — R13.19 ESOPHAGEAL DYSPHAGIA: Primary | ICD-10-CM

## 2018-02-15 DIAGNOSIS — K21.9 GASTROESOPHAGEAL REFLUX DISEASE, ESOPHAGITIS PRESENCE NOT SPECIFIED: ICD-10-CM

## 2018-02-15 PROCEDURE — D9220A PRA ANESTHESIA: Mod: ANES,,, | Performed by: ANESTHESIOLOGY

## 2018-02-15 PROCEDURE — D9220A PRA ANESTHESIA: Mod: CRNA,,, | Performed by: NURSE ANESTHETIST, CERTIFIED REGISTERED

## 2018-02-15 PROCEDURE — 63600175 PHARM REV CODE 636 W HCPCS: Performed by: NURSE ANESTHETIST, CERTIFIED REGISTERED

## 2018-02-15 PROCEDURE — 37000008 HC ANESTHESIA 1ST 15 MINUTES: Performed by: INTERNAL MEDICINE

## 2018-02-15 PROCEDURE — 43235 EGD DIAGNOSTIC BRUSH WASH: CPT | Performed by: INTERNAL MEDICINE

## 2018-02-15 PROCEDURE — 25000003 PHARM REV CODE 250: Performed by: INTERNAL MEDICINE

## 2018-02-15 PROCEDURE — 43235 EGD DIAGNOSTIC BRUSH WASH: CPT | Mod: ,,, | Performed by: INTERNAL MEDICINE

## 2018-02-15 PROCEDURE — 37000009 HC ANESTHESIA EA ADD 15 MINS: Performed by: INTERNAL MEDICINE

## 2018-02-15 PROCEDURE — 25000003 PHARM REV CODE 250: Performed by: NURSE ANESTHETIST, CERTIFIED REGISTERED

## 2018-02-15 RX ORDER — GLYCOPYRROLATE 0.2 MG/ML
INJECTION INTRAMUSCULAR; INTRAVENOUS
Status: DISCONTINUED | OUTPATIENT
Start: 2018-02-15 | End: 2018-02-15

## 2018-02-15 RX ORDER — LIDOCAINE HYDROCHLORIDE 10 MG/ML
INJECTION, SOLUTION INTRAVENOUS
Status: DISCONTINUED | OUTPATIENT
Start: 2018-02-15 | End: 2018-02-15

## 2018-02-15 RX ORDER — SODIUM CHLORIDE 9 MG/ML
INJECTION, SOLUTION INTRAVENOUS CONTINUOUS
Status: DISCONTINUED | OUTPATIENT
Start: 2018-02-15 | End: 2018-02-15 | Stop reason: HOSPADM

## 2018-02-15 RX ORDER — PROPOFOL 10 MG/ML
VIAL (ML) INTRAVENOUS
Status: DISCONTINUED | OUTPATIENT
Start: 2018-02-15 | End: 2018-02-15

## 2018-02-15 RX ADMIN — PROPOFOL 25 MG: 10 INJECTION, EMULSION INTRAVENOUS at 01:02

## 2018-02-15 RX ADMIN — PROPOFOL 50 MG: 10 INJECTION, EMULSION INTRAVENOUS at 01:02

## 2018-02-15 RX ADMIN — LIDOCAINE HYDROCHLORIDE 50 MG: 10 INJECTION, SOLUTION INTRAVENOUS at 01:02

## 2018-02-15 RX ADMIN — SODIUM CHLORIDE: 0.9 INJECTION, SOLUTION INTRAVENOUS at 01:02

## 2018-02-15 RX ADMIN — PROPOFOL 100 MG: 10 INJECTION, EMULSION INTRAVENOUS at 01:02

## 2018-02-15 RX ADMIN — GLYCOPYRROLATE 0.1 MG: 0.2 INJECTION, SOLUTION INTRAMUSCULAR; INTRAVENOUS at 01:02

## 2018-02-15 NOTE — PROVATION PATIENT INSTRUCTIONS
Discharge Summary/Instructions after an Endoscopic Procedure  Patient Name: Karine Nayak  Patient MRN: [id number]  Patient YOB: 1970     Thursday, February 15, 2018  Vitaliy Moya MD  RESTRICTIONS:  During your procedure today, you received medications for sedation.  These   medications may affect your judgment, balance and coordination.  Therefore,   for 24 hours, you have the following restrictions:   - DO NOT drive a car, operate machinery, make legal/financial decisions,   sign important papers or drink alcohol.    ACTIVITY:  The following day: return to full activity including work, except no heavy   lifting, straining or running for 3 days if polyps were removed.  DIET:  Eat and drink normally unless instructed otherwise.     TREATMENT FOR COMMON SIDE EFFECTS:  - Mild abdominal pain, belching, bloating or excessive gas: rest, eat   lightly and use a heating pad.  - Sore Throat: treat with throat lozenges and/or gargle with warm salt   water.  SYMPTOMS TO WATCH FOR AND REPORT TO YOUR PHYSICIAN:  1. Abdominal pain or bloating, other than gas cramps.  2. Chest pain.  3. Back pain.  4. Chills or fever occurring within 24 hours after the procedure.  5. Rectal bleeding, which would show as bright red, maroon, or black stools.   (A tablespoon of blood from the rectum is not serious, especially if   hemorrhoids are present.)  6. Vomiting.  7. Weakness or dizziness.  8. Because air was used during the procedure, expelling large amounts of air   from your rectum or belching is normal.  9. If a bowel prep was taken, you may not have a bowel movement for 1-3   days.  This is normal.  GO DIRECTLY TO THE NEAREST EMERGENCY ROOM IF YOU HAVE ANY OF THE FOLLOWING:      Difficulty breathing  Chills and/or fever over 101 F   Persistent vomiting and/or vomiting blood   Severe abdominal pain   Severe chest pain   Black, tarry stools   Bleeding- more than one tablespoon   Any other symptom or condition that you may feel needs  urgent attention  Your doctor recommends these additional instructions:  If any biopsies were taken, your doctor s clinic will contact you in 1 to 2   weeks with any results.  You have a contact number available for emergencies.  The signs and symptoms   of potential delayed complications were discussed with you.  You may return   to normal activities tomorrow.  Written discharge instructions were   provided to you.   You are being discharged to home.   Resume your previous diet.   Continue your present medications.   Return to your referring physician after studies are complete.  For questions, problems or results please call your physician - Vitaliy Moya MD at Work:  [phone].  [facility], EMERGENCY ROOM PHONE NUMBER: [phone]  IF A COMPLICATION OR EMERGENCY SITUATION ARISES AND YOU ARE UNABLE TO REACH   YOUR PHYSICIAN - GO DIRECTLY TO THE EMERGENCY ROOM.  Vitaliy Moya MD  2/15/2018 1:55:08 PM  This report has been verified and signed electronically.

## 2018-02-15 NOTE — ANESTHESIA PREPROCEDURE EVALUATION
02/15/2018  Karine Nayak is a 47 y.o., female.  Pre-operative evaluation for ESOPHAGOGASTRODUODENOSCOPY (EGD) (N/A)    Chief Complaint: GERD, dysphagia    PMH:  S/p gastric sleeve  with esophageal perf, pneumonia managed elsewhere   Anxiety    Fibromyalgia    Hyperlipidemia    Hypertension    Inflammatory osteoarthritis    OCD (obsessive compulsive disorder)    Rectocele    Scoliosis    Thyroid disease         Past Surgical History:   Procedure Laterality Date    bladder lift      GASTRECTOMY  2016    Dr Estevez    HYSTERECTOMY      KNEE SURGERY Right     TONSILLECTOMY      TUBAL LIGATION           Vital Signs Range (Last 24H):  Temp:  [36.8 °C (98.2 °F)]   Pulse:  [95]   Resp:  [18]   BP: (116)/(58)   SpO2:  [97 %]       CBC:   No results for input(s): WBC, RBC, HGB, HCT, PLT, MCV, MCH, MCHC in the last 720 hours.    CMP: No results for input(s): NA, K, CL, CO2, BUN, CREATININE, GLU, MG, PHOS, CALCIUM, ALBUMIN, PROT, ALKPHOS, ALT, AST, BILITOT in the last 720 hours.    INR:  No results for input(s): PT, INR, PROTIME, APTT in the last 720 hours.      Diagnostic Studies:      EKD Echo:  Anesthesia Evaluation    I have reviewed the Patient Summary Reports.    I have reviewed the Nursing Notes.   I have reviewed the Medications.     Review of Systems  Anesthesia Hx:  No problems with previous Anesthesia    Social:  Smoker 0.5 ppd   Cardiovascular:  Cardiovascular Normal     Pulmonary:  Pulmonary Normal    Neurological:  Neurology Normal        Physical Exam  General:  Obesity    Airway/Jaw/Neck:  Airway Findings: Mouth Opening: Normal Tongue: Normal  General Airway Assessment: Good  Mallampati: I  TM Distance: Normal, at least 6 cm  Jaw/Neck Findings:  Neck ROM: Normal ROM      Dental:  Dental Findings: In tact   Chest/Lungs:  Chest/Lungs Findings: Clear to auscultation, Normal Respiratory  Rate     Heart/Vascular:  Heart Findings: Rate: Normal  Rhythm: Regular Rhythm  Sounds: Normal        Mental Status:  Mental Status Findings:  Cooperative, Alert and Oriented         Anesthesia Plan  Type of Anesthesia, risks & benefits discussed:  Anesthesia Type:  general  Patient's Preference:   Intra-op Monitoring Plan:   Intra-op Monitoring Plan Comments:   Post Op Pain Control Plan:   Post Op Pain Control Plan Comments:   Induction:   IV  Beta Blocker:  Patient is not currently on a Beta-Blocker (No further documentation required).       Informed Consent: Patient understands risks and agrees with Anesthesia plan.  Questions answered. Anesthesia consent signed with patient.  ASA Score: 3     Day of Surgery Review of History & Physical:    H&P update referred to the surgeon.         Ready For Surgery From Anesthesia Perspective.

## 2018-02-15 NOTE — H&P (VIEW-ONLY)
"BARIATRIC NEW PATIENT EVALUATION    CHIEF COMPLAINT:   Morbid obesity Body mass index is 48.99 kg/m². s/p lap gastric sleeve 12/13/2016 with Dr Estevez at [facility]    HISTORY OF PRESENT ILLNESS:  Karine Nayak  is a 47 y.o. female presenting for morbid obesity Body mass index is 48.99 kg/m². and inability to lose weight with her gastric sleeve.  She complains that she continues to gain weight.  She reports that she tries to follow the bariatric diet plan indicating that she drinks protein shakes twice a day, eats 1 meal, and 2 high protein snacks.  She does not exercise regularly due to fibromyalgia.  She reports that she is addicted to cheese.  She reports that her post operative course was complicated and has copies of her operative report, and some hospital documentation.    Review of hospital documents: esophageal perforation on 12/14/16 confirmed with Gastrogaffin swallow and non-contrast CT of abdomen.  Pt was transferred to [facility] (no records from [facility] were received, scanned into media).  Was in hospital for 6 weeks after sleeve: perforated esophagus and a leak.  Pneumonia x2 months also. Saw PT and respiratory therapy.  Central line left a nasty scar JOHNSON chest.     She states that she has not seen a bariatric provider since about 3-6 months after her surgery.  She reports that the lowest weight was 277 pounds around 3-6 months after surgery.  She reprots that she is gaining since then.      Today she also c/o "bad acid reflux" with all solid meals.  She describes as substernal burning.  Food sticks and comes up: 3x's/ week. Not on anything.  Will take Tums from time to time. No H2 or PPI.      Last visit with previous bariatric provider:  Around 6 months (not comfortable with provider).     DIET: Cranbery john juice, 4 glasses/ day.  Some CL.  No plain water.  Drinks everything with a flavor.    24 hr recall:   Bf- premier RTD  Snack- cheddar cheese blocks  Johnson- premier RTD  Snack- yogurt " (Oikos 000)  DI- meat and veggies mainly (avoids rice)    PAST MEDICAL HISTORY:  Past Medical History:   Diagnosis Date    SUZIE (acute kidney injury)     Anxiety     Fibromyalgia     Hyperlipidemia     Hypertension     Inflammatory osteoarthritis     OCD (obsessive compulsive disorder)     Rectocele     Scoliosis     Sycosis     Thyroid disease          PAST SURGICAL HISTORY:  Past Surgical History:   Procedure Laterality Date    bladder lift      HYSTERECTOMY      KNEE SURGERY Right     TONSILLECTOMY      TUBAL LIGATION         FAMILY HISTORY:  Family History   Problem Relation Age of Onset    Hypertension Mother     Mental illness Father     COPD Father     Cancer Father        SOCIAL HISTORY:  Social History     Social History    Marital status:      Spouse name: N/A    Number of children: N/A    Years of education: N/A     Occupational History    Not on file.     Social History Main Topics    Smoking status: Former Smoker     Packs/day: 0.50     Types: Cigarettes, Vaping with nicotine, Vaping w/o nicotine     Start date: 2001     Quit date: 2016    Smokeless tobacco: Never Used    Alcohol use No    Drug use: No    Sexual activity: Not on file     Other Topics Concern    Not on file     Social History Narrative    No narrative on file       MEDICATIONS:  Current Outpatient Prescriptions   Medication Sig Dispense Refill    alprazolam (XANAX) 1 MG tablet Take 1 tablet (1 mg total) by mouth every 8 (eight) hours. 90 tablet 5    aripiprazole (ABILIFY) 10 MG Tab TAKE ONE TABLET BY MOUTH ONCE A DAY 30 tablet 5    BENICAR HCT 40-25 mg per tablet Take 1 tablet by mouth once daily.      calcium citrate (CALCITRATE) 200 mg (950 mg) tablet Take 1 tablet by mouth once daily.      cyanocobalamin (VITAMIN B-12) 100 MCG tablet Take 100 mcg by mouth once daily.      cyclobenzaprine (FLEXERIL) 10 MG tablet Take 1 tablet by mouth 2 (two) times daily.      duloxetine (CYMBALTA) 60 MG  capsule TAKE ONE CAPSULE BY MOUTH ONCE A DAY 30 capsule 3    fluticasone (FLONASE) 50 mcg/actuation nasal spray       gabapentin (NEURONTIN) 400 MG capsule Take 1 capsule by mouth 2 (two) times daily.      hydrocodone-acetaminophen 5-325mg (NORCO) 5-325 mg per tablet       hydrocodone-acetaminophen 7.5-325mg (NORCO) 7.5-325 mg per tablet Take 1 tablet by mouth 3 (three) times daily.      irbesartan-hydrochlorothiazide (AVALIDE) 300-12.5 mg per tablet       levocetirizine (XYZAL) 5 MG tablet Take 1 tablet by mouth once daily.      promethazine-dextromethorphan (PROMETHAZINE-DM) 6.25-15 mg/5 mL Syrp Take 5 mLs by mouth every 6 (six) hours as needed.  5    spironolactone (ALDACTONE) 100 MG tablet Take 1 tablet by mouth Daily.      vitamin D 1000 units Tab Take 1,000 Units by mouth once daily.      atorvastatin (LIPITOR) 20 MG tablet Take 20 mg by mouth once daily.      iron ps cmplx-folic acid-vit C (NOVAFERRUM) 100-1-60 mg/5 mL SolR Take 5 mLs by mouth once daily. 120 mL 3    lidocaine (LIDODERM) 5 % Place 1 patch onto the skin once daily. Remove & Discard patch within 12 hours or as directed by MD 30 patch 3     No current facility-administered medications for this visit.        ALLERGIES:  Review of patient's allergies indicates:  No Known Allergies    ROS:  Review of Systems   Constitutional: Negative for chills, fever, malaise/fatigue and weight loss.   Eyes: Negative for blurred vision, double vision and pain.   Respiratory: Negative for cough, shortness of breath and wheezing.    Cardiovascular: Negative for chest pain, palpitations and leg swelling.   Gastrointestinal: Positive for heartburn. Negative for abdominal pain, blood in stool, constipation, diarrhea, melena, nausea and vomiting.   Genitourinary: Negative for dysuria, frequency and hematuria.   Musculoskeletal: Positive for back pain, joint pain and myalgias. Negative for falls and neck pain.   Skin: Negative for itching and rash.  "  Neurological: Negative for dizziness, tingling, focal weakness, weakness and headaches.   Psychiatric/Behavioral: Negative for depression and suicidal ideas. The patient is nervous/anxious.        PE:  Vitals:    02/08/18 1137   BP: 130/72   Pulse: (!) 115   Weight: (!) 144 kg (317 lb 7.4 oz)   Height: 5' 7.5" (1.715 m)       Physical Exam   Constitutional: She is oriented to person, place, and time. She appears well-developed and well-nourished. No distress.   Morbidly obese   HENT:   Head: Normocephalic and atraumatic.   Eyes: Conjunctivae are normal. Right eye exhibits no discharge. Left eye exhibits no discharge. No scleral icterus.   Neck: Neck supple.   Cardiovascular: Normal rate, regular rhythm and normal heart sounds.  Exam reveals no gallop and no friction rub.    No murmur heard.  Pulmonary/Chest: Effort normal and breath sounds normal. No respiratory distress. She has no wheezes. She has no rales.   Abdominal: Soft. Bowel sounds are normal. She exhibits no distension and no mass. There is no tenderness. There is no rebound and no guarding. No hernia.   Musculoskeletal: Normal range of motion. She exhibits no edema.   Neurological: She is alert and oriented to person, place, and time.   Skin: Skin is warm, dry and intact. No rash noted. She is not diaphoretic. No erythema. No pallor.   Dry skin noted on abdomen   Psychiatric: She has a normal mood and affect. Her speech is normal and behavior is normal. Judgment and thought content normal.   Nursing note and vitals reviewed.       DIAGNOSIS:  1. Morbid with Body mass index is 48.99 kg/m². and inability to lose weight.  2. Co-morbidities: MICHELINE, essential hypertension, hyperlipidemia, and chronic pain.  3. Fibromyalgia and OCD: abilify, Xanax, and cymbalta  4. Current tobacco use (when her  is out of town).     PLAN:  The patient is not a candidate for Bariatric Surgery at this time due to daily tobacco use.  She will need to quit smoking and have a " negative nicotine test prior to having revision.  She has been provider with smoking cessation packets to call and schedule an appointment.  She has signed the smoking cessation contract and it will be scanned in the media tab.      If she is able to quit smoking and complete the work up listed below, she may become a good candidate.  she is interested in revision of gastric sleeve to Dennis-en-Y with Dr. Hoskins. The surgery and post-op care were discussed in detail with the patient. All questions were answered.    she understands that bariatric surgery is a tool to aid in weight loss and that she needs to be committed to the diet and exercise post-operatively for successful weight loss.  Discussed expected weight loss outcomes after surgery which is 10% of the excess weight on her frame.  Goal weight is 285#s.  Discussed with patient that bariatric surgery is not the easy way out and that it will take plenty of dedication on the patient's part to be successful. Also discussed the possibility of weight regain if the patient strays from the diet guidelines or exercise requirements. Patient verbalized understanding and wishes to proceed with the work-up.    Patient willing to sign blood consent: Yes    ORDERS:  1. Chest X-Ray, EKG, EGD and PFT  2. Psychological Consult, Bariatric Dietician Consult and PCP Clearance  3. Bariatric Labs: BMP, CBC, Folate Serum, H. pylori, HgA1C, Hepatic Panel/LFT, Iron & TIBC, Lipid Profile, Magnesium, Phosphate, T3, T4, TSH, Free T4, Vitamin B12, and Vitamin B1.  4. 6 month MSD.  5. Quit tobacco products  6. Begin omeprazole 40 mg BID for now.  Will consider decreasing to once daily once/ if her symptoms subside.  If symptoms do not improve, will consider adding zantac BID and carafate QID pending results of EGD.   7. The patient was given a copy of nutrition booklet. I reviewed denson points with her, including following a high-protein diet with multiple small meals daily, low  carbohydrates, no snacking, and avoiding sugary foods.  8. Reviewed appropriate vitamin supplementation, including complete multi-vitamin with iron and B complex twice daily, calcium citrate 1500 mg daily in split doses, and daily sublingual vitamin B12 supplement.  9. Encouraged the patient to begin regular exercise, as this is an important part of weight loss and weight loss maintenance.    Primary Physician: Claritza Kingsley NP  RTC: As scheduled.    60 minute visit, over 50% of time spent counseling patient face to face on surgical options, risks, benefits, expected diet, recommended exercise regimen, and expected weight loss.

## 2018-02-15 NOTE — ANESTHESIA POSTPROCEDURE EVALUATION
"Anesthesia Post Evaluation    Patient: Karine Nayak    Procedure(s) Performed: Procedure(s) (LRB):  ESOPHAGOGASTRODUODENOSCOPY (EGD) (N/A)    Final Anesthesia Type: general  Patient location during evaluation: PACU  Patient participation: Yes- Able to Participate  Level of consciousness: awake and alert and oriented  Post-procedure vital signs: reviewed and stable  Pain management: adequate  Airway patency: patent  PONV status at discharge: No PONV  Anesthetic complications: no      Cardiovascular status: hemodynamically stable  Respiratory status: unassisted  Hydration status: euvolemic  Follow-up not needed.        Visit Vitals  /61 (BP Location: Left arm, Patient Position: Lying)   Pulse 89   Temp 36.8 °C (98.2 °F) (Temporal)   Resp 18   Ht 5' 7.5" (1.715 m)   Wt (!) 143.8 kg (317 lb)   LMP 01/30/2013   SpO2 98%   Breastfeeding? No   BMI 48.92 kg/m²       Pain/Rachna Score: Pain Assessment Performed: Yes (2/15/2018  2:02 PM)  Presence of Pain: non-verbal indicators absent (2/15/2018  2:02 PM)  Rachna Score: 10 (2/15/2018  2:02 PM)      "

## 2018-02-15 NOTE — TRANSFER OF CARE
"Anesthesia Transfer of Care Note    Patient: Karine Nayak    Procedure(s) Performed: Procedure(s) (LRB):  ESOPHAGOGASTRODUODENOSCOPY (EGD) (N/A)    Patient location: PACU    Anesthesia Type: general    Transport from OR: Transported from OR on 2-3 L/min O2 by NC with adequate spontaneous ventilation    Post pain: adequate analgesia    Post assessment: no apparent anesthetic complications    Post vital signs: stable    Level of consciousness: awake    Nausea/Vomiting: no nausea/vomiting    Complications: none    Transfer of care protocol was followed      Last vitals:   Visit Vitals  BP (!) 116/58 (BP Location: Left arm, Patient Position: Lying)   Pulse 95   Temp 36.8 °C (98.2 °F) (Temporal)   Resp 18   Ht 5' 7.5" (1.715 m)   Wt (!) 143.8 kg (317 lb)   LMP 01/30/2013   SpO2 97%   Breastfeeding? No   BMI 48.92 kg/m²     "

## 2018-02-19 ENCOUNTER — HOSPITAL ENCOUNTER (OUTPATIENT)
Dept: RADIOLOGY | Facility: HOSPITAL | Age: 48
Discharge: HOME | End: 2018-02-19
Attending: PHYSICIAN ASSISTANT
Payer: MEDICARE

## 2018-02-19 ENCOUNTER — TELEPHONE (OUTPATIENT)
Dept: BARIATRICS | Facility: CLINIC | Age: 48
End: 2018-02-19

## 2018-02-19 ENCOUNTER — CLINICAL SUPPORT (OUTPATIENT)
Dept: BARIATRICS | Facility: CLINIC | Age: 48
End: 2018-02-19
Payer: MEDICARE

## 2018-02-19 ENCOUNTER — HOSPITAL ENCOUNTER (OUTPATIENT)
Dept: CARDIOLOGY | Facility: CLINIC | Age: 48
Discharge: HOME | End: 2018-02-19
Payer: MEDICARE

## 2018-02-19 DIAGNOSIS — G47.33 OSA ON CPAP: ICD-10-CM

## 2018-02-19 DIAGNOSIS — I10 ESSENTIAL HYPERTENSION: ICD-10-CM

## 2018-02-19 DIAGNOSIS — E66.01 MORBID OBESITY DUE TO EXCESS CALORIES: ICD-10-CM

## 2018-02-19 DIAGNOSIS — Z71.3 DIETARY COUNSELING: ICD-10-CM

## 2018-02-19 DIAGNOSIS — E55.9 VITAMIN D DEFICIENCY: Primary | ICD-10-CM

## 2018-02-19 DIAGNOSIS — E66.01 MORBID OBESITY WITH BMI OF 45.0-49.9, ADULT: ICD-10-CM

## 2018-02-19 PROCEDURE — 71046 X-RAY EXAM CHEST 2 VIEWS: CPT | Mod: TC,FY

## 2018-02-19 PROCEDURE — 93010 ELECTROCARDIOGRAM REPORT: CPT | Mod: S$PBB,,, | Performed by: INTERNAL MEDICINE

## 2018-02-19 PROCEDURE — 93005 ELECTROCARDIOGRAM TRACING: CPT | Mod: PBBFAC | Performed by: INTERNAL MEDICINE

## 2018-02-19 PROCEDURE — 99499 UNLISTED E&M SERVICE: CPT | Mod: S$PBB,,, | Performed by: DIETITIAN, REGISTERED

## 2018-02-19 PROCEDURE — 97802 MEDICAL NUTRITION INDIV IN: CPT | Mod: GA,PBBFAC | Performed by: DIETITIAN, REGISTERED

## 2018-02-19 PROCEDURE — 71046 X-RAY EXAM CHEST 2 VIEWS: CPT | Mod: 26,,, | Performed by: RADIOLOGY

## 2018-02-19 PROCEDURE — 99212 OFFICE O/P EST SF 10 MIN: CPT | Mod: PBBFAC,25 | Performed by: DIETITIAN, REGISTERED

## 2018-02-19 PROCEDURE — 99999 PR PBB SHADOW E&M-EST. PATIENT-LVL II: CPT | Mod: PBBFAC,,, | Performed by: DIETITIAN, REGISTERED

## 2018-02-19 RX ORDER — ERGOCALCIFEROL 1.25 MG/1
50000 CAPSULE ORAL WEEKLY
Qty: 12 CAPSULE | Refills: 0 | Status: SHIPPED | OUTPATIENT
Start: 2018-02-19 | End: 2019-02-13

## 2018-02-19 NOTE — PATIENT INSTRUCTIONS
1200- 1500 calorie Sample meal plan  80-120g protein per day.   Protein drinks and bars: 0-4 grams sugar  Drink lots of water throughout the day and exercise!  MENU # 1  Breakfast: 2 eggs, 1 turkey sausage olimpia, 1 apple  Snack: Atkins bar  Lunch: 2 roll-ups (sliced turkey, low-fat sliced cheese, mustard), 12 baby carrots dipped in 1 Tbsp natural peanut butter  Mid-Day Snack: ¼ cup unsalted almonds, ½ cup fruit  Dinner: 1 chicken thigh simmered in tomato sauce + 2 Tbsp mozzarella cheese, ½ cup black beans, 1/2 cup steamed carrots  Evening Snack: 1/2 cup grapes with 4 cubes low-fat cheddar cheese   ___________________________________________________  MENU # 2  Breakfast: 200 calorie protein drink  Mid-morning snack : ¼ cup unsalted nuts, medium banana  Lunch: 3oz tuna or chicken salad made with 2 tbsp light pal, over salad with tomatoes and cucumbers.   Snack: low-fat cheese stick  Dinner: 3oz hamburger olimpia, slice of low-fat cheese, 1 cup boiled yellow squash and zucchini.   Snack: 6oz light yogurt  _______________________________________________________  Menu #3  Breakfast: 6oz plain Greek yogurt + fruit (½ banana, ½ cup fruit - pineapple, blueberries, strawberries, peach), may add Splenda to estefani.  Lunch: Grilled  chicken breast w/ slice low-fat pepper kathy cheese, 1/2 cup grilled/baked asparagus and small salad with Salad Spritzer.    Mid-Day snack: 200 calorie protein drink   Dinner: 4oz Grilled fish, ½ cup white beans, ½ cup cooked spinach  Evening Snack: fudgsicle no-sugar-added    Menu # 4  Breakfast: 1 scoop vanilla protein powder + 4oz skim milk + 4oz coffee   Snack: Pure protein bar  Lunch: 2 Lettuce tacos: 3oz seasoned ground turkey wrapped in a Buck lettuce leaves with 1 Tbsp shredded cheese and dollop low-fat sour cream  Snack: ½ cup cottage cheese, ½ cup pineapple chunks  Dinner: Shrimp omelet: 2 eggs, ½ cup shrimp, green onions, and shredded  cheese  ______________________________________________________  Menu #5  Breakfast: 1 cup low-fat cottage cheese, ½ cup peaches (no sugar added)  Snack: 1 apple, 4 cubes cheese  Lunch: 3oz baked pork chop, 1 cup okra and tomato stew  Snack: 1/2 cup black beans + salsa + dollop light sour cream  Dinner: Caprese chicken salad: 3 oz chicken breast, 1oz fresh mozzarella, sliced tomato, 1 Tbsp olive oil, basil  Snack: sugar-free popsicle    Menu #6  Breakfast: ½ cup part-skim ricotta cheese, 2 Tbsp sugar-free strawberry preserves, sprinkle of slivered almonds  Snack: 1 orange  Lunch: 3 oz canned chicken, 1oz shredded cheddar cheese, ½  cup black beans, 2 Tbsp salsa  Snack: 200 calorie Protein drink  Dinner: 4 oz grilled salmon steak, over mixed green salad with 2 tbsp light dressing    Meal Ideas for Regular Bariatric Diet  *Recipes and products available at [email or web address]      Breakfast: (15-20g protein)    - Egg white omelet: 2 egg whites or ½ cup Egg Beaters. (Optional proteins: cheese, shrimp, black beans, chicken, sliced turkey) (Optional veggies: tomatoes, salsa, spinach, mushrooms, onions, green peppers, or small slice avocado)     - Egg and sausage: 1 egg or ¼ cup Egg Beaters (any variety), with 1 olimpia or 2 links of Turkey sausage or Veggie breakfast sausage (Alekto or Pricebets)    - Crust-less breakfast quiche: To make a glass pie dish, mix 4oz part skim Ricotta, 1 cup skim milk, and 2 eggs as your base. Add protein: shredded cheese, sliced lean ham or turkey, turkey chen/sausage. Add veggies: tomato, onion, green onion, mushroom, green pepper, spinach, etc.    - Yogurt parfait: Mix 1 - 6oz container Dannon Light N Fit vanilla yogurt, with ¼ cup crushed unsalted nuts    - Cottage cheese and fruit: ½ cup part-skim cottage cheese or ricotta cheese topped with fresh fruit or sugar free preserves     - Kirti Garcia's Vanilla Egg custard* (add 2 Tbsp instant coffee granules to make Cappuccino  Custard*)    - Hi-Protein café latte (skim milk, decaf coffee, 1 scoop protein powder). Optional to add Sugar free syrup or extract flavoring.    - Breakfast Lox: spread fat free cream cheese on slices of smoked salmon. Serve over scrambled or egg over easy (sauteed with nonstick cookspray) OR on a cucumber slice    - Eggwhich: Scramble or cook 1 large egg over easy using nonstick cookspray. Place between 2 slices of Georgian chen and low fat cheese.     Lunch: (20-30g protein)    - ½ cup Black bean soup (Homemade or Progresso), with ¼ cup shredded low-fat cheese. Top with chopped tomato or fresh salsa.     - Lean deli turkey breast and low-fat sliced cheese, mustard or light pal to moisten, rolled up together, or wrapped in a Buck lettuce leaf    - Chicken salad made from dinner leftovers, moisten with low-fat salad dressing or light pal. Also try leftover salmon, shrimp, tuna or boiled eggs. Serve ½ cup over dark green salad    - Fat-free canned refried beans, topped with ¼ cup shredded low-fat cheese. Top with chopped tomato or fresh salsa.     - Greek salad: Top mixed greens with 1-2oz grilled chicken, tomatoes, red onions, 2-3 kalamata olives, and sprinkle lightly with feta cheese. Spritz with Balsamic vinegar to taste.     - Crust-less lunch quiche: To make a glass pie dish, mix 4oz part skim Ricotta, 1 cup skim milk, and 2 eggs as your base. Add protein: shredded cheese, sliced lean ham or turkey, shrimp, chicken. Add veggies: tomato, onion, green onion, mushroom, green pepper, spinach, artichoke, broccoli, etc.    - Pizza bake: spread a  horacio lorne mushroom with tomato sauce, low-fat shredded mozzarella and turkey pepperoni or Day chen. Add any veggies. Roast for 10-15 minutes, until cheese melted.     - Cucumber crab bites: Spread ¼ cup crab dip (lump crabmeat + light cream cheese and green onions) over sliced cucumber.     - Chicken with light spinach and artichoke dip*: Puree in food  processor: 6oz cooked and drained spinach, 2 cloves garlic, 1 can cannelloni beans, ½ cup chopped green onions, 1 can drained artichoke hearts (not marinated in oil), lemon juice and basil. Mix in 2oz chopped up chicken.    Supper: (20-30g protein)    - Serve grilled fish over dark green salad tossed with low-fat dressing, served with grilled asparagus plata     - Rotisserie chicken salad: served with sliced strawberries, walnuts, fat-free feta cheese crumbles and 1 tbsp Ojedas Own Light Raspberry [place] Vinaigrette    - Shrimp cocktail: Dip cold boiled shrimp in homemade low-sugar cocktail sauce (1/2 cup Katia One Carb ketchup, 2 tbsp horseradish, 1/4 tsp hot sauce, 1 tsp Worcestershire sauce, 1 tbsp freshly-squeezed lemon juice). Serve with dark green salad, walnuts, and crumbled blue cheese drizzled with olive oil and Balsamic vinegar    - Tuna Melt: Spread tuna salad onto 2 thick slices of tomato. Top with low-fat cheese and broil until cheese is melted. May also be made with chicken salad of shrimp salad. Bulger with different types of cheeses.    - Chicken or beef fajitas (no tortilla, rice, beans, chips). Top meat and veggies w/ fresh salsa, fat free sour cream.     - Homemade low-fat Chili using extra lean ground beef or ground turkey. Top with shredded cheese and salsa as desired. May add dollop fat-free sour cream if desired    - Chicken parmesan: Top chicken breast w/ low sugar marinara sauce, mozzarella cheese and bake until chicken reaches 165*.  Serve w/ spaghetti SQUASH or Portuguese cut green beans    - Dinner Omelet with shrimp or chicken and onion, green peppers and chives.    - No noodle lasagna: Use sliced zucchini or eggplant in place of noodles.  Layer with part skim ricotta cheese and low sugar meat sauce (use very lean ground beef or ground turkey).    - Mexican chicken bake: Bake chunks of chicken breast or thigh with taco seasoning, Pace brand enchilada sauce, green onions and low-fat  cheese. Serve with ¼ cup black beans or fat free refried beans topped with chopped tomatoes or salsa.    - Leslie frozen meatballs, simmered in Classico Marinara sauce. Different flavors of salsa or spaghetti sauce create different dishes! Sprinkle with parmesan cheese. Serve with grilled or steamed veggies, or a dark green salad.    - Simmer boneless skinless chicken thigh chunks in Classico Marinara sauce or roasted salsa until tender with chopped onion, bell pepper, garlic, mushrooms, spinach, etc.     - Hamburger or veggie burger, without the bun, dressed the way you like. Served with grilled or steamed veggies.    - Eggplant parmesan: Bake slices of eggplant at 350 degrees for 15 minutes. Layer tomato sauce, sliced eggplant and low-fat mozzarella cheese in a baking dish and cover with foil. Bake 30-40 more minutes or until bubbly. Uncover and bake at 400 degrees for about 15 more minutes, or until top is slightly crisp.    - Fish tacos: grilled/baked white fish, wrapped in [place] lettuce leaf, topped with salsa, shredded low-fat cheese, and light coleslaw.    - Chicken rick: Sprinkle chicken w/ 1 tsp of hidden [place] ranch dip mix. Then grill chicken and top with black beans, salsa and 1 tsp fat free sour cream.     - Cauliflower pizza crust: Use cauliflower as crust (see recipe on zo, no flour!). Top w/ low fat cheese, turkey pepperoni and veggies and bake again    - chicken or turkey crust pizza: use ground chicken or turkey instead of cauliflower, spread in [place] and bake at 350 for about 20-30 minutes(may want to add garlic, black pepper, oregano and other herbs to ground meat mixture).  Remove and top w/ low fat cheese, turkey pepperoni and veggies and bake again for another 10 minutes or until cheese is browned.     Snacks: (100-200 calories; >5g protein)    - 1 low-fat cheese stick with 8 cherry tomatoes or 1 serving fresh fruit  - 4 thin slices fat-free turkey breast and 1 slice low-fat  cheese  - 4 thin slices fat-free honey ham with wedge of melon  - 6-8 edamame pods (equivalent to about 1/4 cup edamame without pods).   - 1/4 cup unsalted nuts with ½ cup fruit  - 6-oz container Dannon Light n Fit vanilla yogurt, topped with 1oz unsalted nuts         - apple, celery or baby carrots spread with 2 Tbsp PB2  - apple slices with 1 oz slice low-fat cheese  - Apple slices dipped in 2 Tbsp of PB2  - celery, cucumber, bell pepper or baby carrots dipped in ¼ cup hummus bean spread or light spinach and artichoke dip (*recipe in lunch section)  - celery, cucumber, baby carrots dipped in high protein greek yogurt (Mix 16 oz plain greek yogurt + 1 packet of hidden valley ranch dip mix)  - Mingo Links Beef Steak - 14g protein! (similar to beef jerky)  - 2 wedges Laughing Cow - Light Herb & Garlic Cheese with sliced cucumber or green bell pepper  - 1/2 cup low-fat cottage cheese with ¼ cup fruit or ¼ cup salsa  - RTD Protein drinks: Atkins, Low Carb Slim Fast, EAS light, Muscle Milk Light, etc.  - Homemade Protein drinks: GNC Soy95, Isopure, Nectar, UNJURY, Whey Gourmet, etc. Mix 1 scoop powder with 8oz skim/1% milk or light soymilk.  - Protein bars: Atkins, EAS, Pure Protein, Think Thin, Detour, etc. Must have 0-4 grams sugar - Read the label.    Takeout Options: No more than twice/week  Deli - Salads (no pasta or rice), meats, cheeses. Roasted chicken. Lox (salmon)    Mexican - Platters which don't include tortillas, chips, or rice. Go easy on the beans. Example: Fajitas without the tortillas. Ask the  not to bring chips to the table if they are too tempting.    Greek - Meat or fish and vegetable, but no bread or rice. Including hummus, baba ganoush, etc, is OK. Most sit-down Greek restaurants can provide you with cucumber slices for dipping instead of whitney bread.    Fast Food (Avoid as much as possible) - Salads (no croutons and limit salad dressing to 2 tbsp), grilled chicken sandwich without the bun  and ask for no pal. Littles low fat chili or Taco Bell pintos and cheese.    BBQ - The meats are fine if you ask for sauces on the side, but most of the traditional side dishes are loaded with carbs. Triston slaw, baked beans and BBQ sauce are typically made with sugar.    Chinese - Nothing deep-fried, no rice or noodles. Many Chinese sauces have starch and sugar in them, so you'll have to use your judgement. If you find that these sauces trigger cravings, or cause Dumping, you can ask for the sauce to be made without sugar or just use soy sauce.

## 2018-02-19 NOTE — TELEPHONE ENCOUNTER
Notified patient that her Vitamin d was low and a prescription was sent.  Verbalized understanding.

## 2018-02-19 NOTE — TELEPHONE ENCOUNTER
----- Message from CAROLE Tony sent at 2/19/2018 12:48 PM CST -----  Low Vit D, pt notified via [facility] and Rx sent to pharmacy

## 2018-02-19 NOTE — PROGRESS NOTES
NUTRITIONAL CONSULT    Referring Physician: Vitaliy Hoskins M.D.  Reason for MNT Referral: Initial assessment for conversion from o/s Sleeve to  laparoscopic Dennis-en-Y    47 y.o. female presents with a BMI of Body mass index is 48.44 kg/m² (pended). Had Sleeve o/s 12/13/16 at a weight of 347 lbs. She states that she has not seen a bariatric provider since 6 months after her surgery. At that time she was at her lowest weight of 277 lbs. She reports that she began gaining weight while on bedrest for 6 weeks d/t pneumonia (no exercise, eating chips, cheese and crackers, and drinking diet sodas). She admits that she was eating a lot of chips - emotional eating. Doesn't have a sweet tooth.     Pt states that 3 months ago she hit her highest weight after surgery of 317 lbs and began making some changes - no more chips, drinking Premier protein shakes again. She has lost 4 lbs. She wants revision to Gastric Bypass.    Past Medical History:   Diagnosis Date    SUZIE (acute kidney injury)     Anemia     Anxiety     Fibromyalgia     Hyperlipidemia     Hypertension     Inflammatory osteoarthritis     OCD (obsessive compulsive disorder)     MICHELINE on CPAP     Rectocele     Scoliosis     Sycosis     Thyroid disease        CLINICAL DATA:  47 y.o.-year-old Black or  female.  Height: 5 ft. 7 in.  Weight: 313 lbs  IBW: 147 lbs  BMI: 48.4    Goal for Bariatric Surgery: to improve health, to improve quality of life, to lose weight and to prevent future medical conditions    NUTRITION & HEALTH HISTORY:  Greatest challenge: starchy CHO, irregular meal patterns, emotional eating and high-fat diet    Current diet recall:     B: none or Protein shake  Sn: none or cheese stick  L: none or Protein shake or salad  D: fried fish, mashed potatoes, corn, diet peach tea    Current Diet:  Meal pattern: Irregular. 1-3 meals  Protein supplements: Premier shakes. Not daily.  Snacking: Cut out Chips. Eats a lot of cheese.    Vegetables: Likes a variety. Eats almost daily.  Fruits: Likes a variety. Eats 2-3 times per week.  Beverages: water, diet soda, diet tea and coffee without sugar  Dining out: Monthly. Mostly fast food, restaurants and take-out.  Cooking at home: Daily. Mostly baked, grilled, smothered and fried meat, fish, starchy CHO and vegetables.    Exercise:  Past exercise: Fair    Current exercise: Fair. Walking on treadmill for 20 min, twice/week  Restrictions to exercise: pain with fibromyalgia    Vitamins / Minerals / Herbs:   FC, Ca Citrate, Vit D, B12    Social:  Grocery shopping and food prep done by the pt.  Patient believes the household may not be supportive after surgery. She may have to cook separate meals.  Alcohol: None.  Smoking: 3-4 packs per week.    ASSESSMENT:  · Patient reports attempts at weight loss, only to regain lost weight.  · Patient demonstrated knowledge of healthy eating behaviors and exercise patterns; admits to not eating healthy and not exercising at this point.  · Patient states willingness to change lifestyle and make behavior modifications.  · Expect fair  compliance after surgery at this time.    Insurance requires no medically supervised diet prior to consideration for bariatric surgery.    BARIATRIC DIET DISCUSSION:  Discussed diet after surgery and related to patients food record.  Reviewed diet progression before and after surgery.  Reinforced that surgery is not a magic bullet and importance of low fat foods and no snacking.  Stressed importance of exercise and its role in achieving weight loss goals.  Answered all questions.    RECOMMENDATIONS:  Patient is a potential candidate for bariatric surgery conversion from Sleeve to Bypass.    Needs at least 2 additional visit(s) with RD for revision MSD protocol.    PLAN:  Quit smoking.  Continue to review Bariatric Nutrition Guidebook at home and call with any questions.  Work on Bariatric Nutrition Checklist.  Work on expanding variety  of vegetables.  Work on gradually cutting back on starchy CHO in the diet.  5-6 meals per day.  Start including protein supplements in the diet plan daily.  Increase exercise. Every other day.    RTC in one month with daily food/activity logs for review.    SESSION TIME:  60 minutes

## 2018-02-22 ENCOUNTER — TELEPHONE (OUTPATIENT)
Dept: ENDOSCOPY | Facility: HOSPITAL | Age: 48
End: 2018-02-22

## 2018-02-26 ENCOUNTER — OFFICE VISIT (OUTPATIENT)
Dept: PSYCHIATRY | Facility: CLINIC | Age: 48
End: 2018-02-26
Payer: MEDICARE

## 2018-02-26 DIAGNOSIS — F41.9 ANXIETY: ICD-10-CM

## 2018-02-26 DIAGNOSIS — E78.5 HYPERLIPIDEMIA, UNSPECIFIED HYPERLIPIDEMIA TYPE: ICD-10-CM

## 2018-02-26 DIAGNOSIS — Z98.84 S/P LAPAROSCOPIC SLEEVE GASTRECTOMY: ICD-10-CM

## 2018-02-26 DIAGNOSIS — G47.00 INSOMNIA, UNSPECIFIED TYPE: ICD-10-CM

## 2018-02-26 DIAGNOSIS — G47.33 OSA ON CPAP: ICD-10-CM

## 2018-02-26 DIAGNOSIS — M79.7 FIBROMYALGIA SYNDROME: ICD-10-CM

## 2018-02-26 DIAGNOSIS — F32.A DEPRESSION, UNSPECIFIED DEPRESSION TYPE: ICD-10-CM

## 2018-02-26 DIAGNOSIS — E66.01 MORBID OBESITY DUE TO EXCESS CALORIES: ICD-10-CM

## 2018-02-26 DIAGNOSIS — Z01.818 PREOP EXAMINATION: Primary | ICD-10-CM

## 2018-02-26 DIAGNOSIS — I10 ESSENTIAL HYPERTENSION: ICD-10-CM

## 2018-02-26 PROCEDURE — 96101 PR PSYCHOLOGIC TESTING BY PSYCH/PHYS: CPT | Mod: S$PBB,,, | Performed by: PSYCHOLOGIST

## 2018-02-26 PROCEDURE — 90791 PSYCH DIAGNOSTIC EVALUATION: CPT | Mod: PBBFAC | Performed by: PSYCHOLOGIST

## 2018-02-26 PROCEDURE — 96102 PR PSYCHOLOGIC TESTING BY TECHNICIAN: CPT | Mod: 59,S$PBB,, | Performed by: PSYCHOLOGIST

## 2018-02-26 PROCEDURE — 90791 PSYCH DIAGNOSTIC EVALUATION: CPT | Mod: S$PBB,,, | Performed by: PSYCHOLOGIST

## 2018-02-26 PROCEDURE — 96101 PR PSYCHOLOGIC TESTING BY PSYCH/PHYS: CPT | Mod: PBBFAC | Performed by: PSYCHOLOGIST

## 2018-02-26 PROCEDURE — 96102 PR PSYCHOLOGIC TESTING BY TECHNICIAN: CPT | Mod: PBBFAC | Performed by: PSYCHOLOGIST

## 2018-02-26 NOTE — PROGRESS NOTES
Psychiatry Initial Visit (PhD/LCSW)   Diagnostic Interview - CPT 07430     Date: 02/26/2018    Site: [facility]     Referral source:  Vitaliy Hoskins Jr., M.D.    Clinical status of patient: Outpatient     Ms. Karine Nayak, a 48-year-old Black  female, presented for initial evaluation visit. Before this evaluation was initiated, the purposes and process of the assessment and the limits of confidentiality were discussed with the patient who expressed understanding of these issues and orally consented to proceed with the evaluation.     Chief complaint/reason for encounter: Routine psychological evaluation prior to bariatric surgery.     Type of surgery sought:  Revision to Gastric Bypass    History of present illness:  Ms. Karine Nayak is a 48-year-old Black  female who is pursuing revision bariatric surgery to improve her health and quality of life.  Ms. Nayak previously had a sleeve gastrectomy on 12/13/2016 with Dr. Estevez at [facility].  She reports complications post-surgery with esophageal perforation on 12/14/16 which required a six-week hospital stay.  She also had pneumonia for two months after her hospitalization.  Her lowest weight was 277 lbs. and she has been regaining weight since that time with bad acid reflux.  In her initial consult with Laurel Montana PA-C, on 02/08/208 she was considered not a candidate for Bariatric Surgery at this time due to daily tobacco use. [] If she is able to quit smoking and complete the work up she may become a good candidate.    Ms. Nayak has a history of depression, anxiety, insomnia, and questionable Obsessive-Compulsive Disorder (OCD) that is well-managed with Xanax, Abilify, and Cymbalta prescribed by her PCP.  At this time, it does not appear she requires additional psychological treatment.  She denied any history of suicidality or non-suicidal self-injury.  She denied eating disorder symptoms.  She has attempted making positive  lifestyle changes in anticipation for surgery, with reported benefit.  The patient has a Body Mass Index of 48.99 as documented by the referring provider.    Ms. Nayak has struggled with weight since she starting having children (around age 22).  Factors that have contributed to her weight gain over the years include:  working nights and snacking, junk foods, fast food, bread, and large portions (especially when stressed out - going back for seconds).  In addition, Ms. Nayak acknowledges that she is an emotional eater, with stress and boredom as her primary emotional triggers to overeat.  The last time she engaged in emotional eating was two months ago when she and her  , and she had seconds.  She is working on increasing her coping mechanisms for stress, including walking on the treadmill and watching her diet.  She has tried many weight loss methods on her own (i.e., Adipex, diet pills, diet modification, gastric sleeve) with some success (down to 277 lbs. after gastric sleeve), and believes that her biggest weight loss challenge is getting over what happened to me in the hospital - I was depressed from it for a long time.  Even though I watched what I was eating and couldnt overeat, I stayed in a down mood.  I had to pray about it and leave it behind.  She reports she was still losing weight at that time, but got to a plateau and stopped.  Her motivation for seeking surgery now is to improve quality of life.  Her postsurgical goals include being there for my [future] granddaughter, thats whats got me really amped up about losing weight.  She would like to improve health conditions as well.      Ms. Nayak has met with bariatric nutritionist Sandra Lees RD, and reports that she has made the following lifestyle changes since beginning the bariatric program:  reducing smoking, logging food, meal preparation, increasing exercise, and drinking protein (three shakes per day).  She reports  losing a total of 4 lbs. since starting the bariatric program.  She must continue meeting with MsHay Lees to demonstrate the implementation of lifestyle changes prior to clearance for bariatric surgery.  She chose the revision to gastric bypass because she tried to re-start the diet and reset the pouch, and it didnt help.  I knew I couldnt get the sleeve, and I believe with the bypass I could get there.  She understood that she needs to focus on protein intake after surgery, which includes chicken, turkey, eggs, and poultry.  She noted that she will need to stay away from carbohydrates, fatty foods after surgery.  She hopes to lose down to 170 lbs. and expects it will take two years.  She plans to take a few weeks to recover after surgery.  Her mother and son will be available to help her during recovery.    Medical History:  MICHELINE, Essential hypertension; Hyperlipidemia; Chronic pain; Fibromyalgia    Pain:  7/10 (knees)    Psychiatric Symptoms:  Depression:  She reports feeling depressed as early as her teenage years when she lost her grandfather, and noticed that she felt sad and withdrew from others for approximately one week.  When she feels down, she also feels somewhat lethargic.  During these periods, she denied appetite changes, lack of motivation, difficulty concentrating, and suicidal ideation.  She notes that she experiences depressive symptoms when she loses a family member, but she reports her depression has never lasted for longer than one week.  Based on her reports, her symptoms meet criteria for Other Specified Depressive Disorder.  Sera/Hypomania:  Denied.  She denied periods of elevated mood or abnormally increased energy or goal-directed activity.  Anxiety:  She reports occasional anxiety attacks in the past starting when she was 22 years old that would just pop up.  Her anxiety attacks would occur approximately three times per year.  During these attacks, she would experience  difficulty breathing, increased heart rate, and sweatiness.  She believes her anxiety was heightened in social situations or in crowds.  Her last anxiety attack occurred over one year ago.  She denied experiencing excessive, exaggerated anxiety for the past month.  Based on her reports, her symptoms meet criteria for Other Specified Anxiety Disorder.  OCD:  She reports that she is always cleaning up, I find stuff to do.  She spends approximately two hours cleaning each day, but notes that she does not have a job and enjoys tidying her home.  She started this type of cleaning when she was a teenager, and notes that her mother is like me too - shes always cleaning.  She notes that it is difficult for her to leave dirty dishes in the sink, dirty bathroom knobs, or dirty doorknobs.  She believes she could be worried about germs, but could not pinpoint a specific fear.  She does not feel uncomfortable shaking hands, being in public places, or being at others homes with dirty dishes.  She reports her tendencies do not cause her significant problems or impairment.  Although she may experience some symptoms related to OCD, it does not appear that her thoughts or behaviors cause problems warranting a diagnosis of OCD.  She was encouraged to monitor her symptoms and to seek further assessment in the future if she finds herself experiencing impairment.  Thoughts:  Denied delusions, hallucinations.  Suicidal thoughts/behaviors:  Denied.  Self-injury:  Denied.  Substance abuse:  Denied abuse or dependence.  Sleep:  She reports sleep problems since she was a teenager.  Her sleep problems include difficulty falling asleep in which she would stay up for two days, but would experience significant lethargy during those times.  She would eventually fall asleep due to feeling extremely tired.  She occasionally has difficulty staying asleep.  She worked the night shift in the past, which could explain some of her later sleep  difficulties.    Psychiatric History:  Previous diagnosis:  Depression, Anxiety  Previous hospitalizations:  Denied.  History of outpatient treatment:  She was initially prescribed Cymbalta and Xanax and Abilify by her PCP Ophelia Martin NP ([facility]), in 01/06/2015 and 01/20/2015, respectively.  Her medications are currently prescribed by Claritza Kingsley NP ([facility]).  She believes the Abilify helps me not be as down, and with the Xanax Ive been sleeping, and the Cymbalta helps with the pain some.  The Xanax really helps with anxiety attacks.  She has never been treated by a therapist.  Previous suicide attempt:  Denied.    Trauma history:  Denied.    History of eating disorders:  History of bulimia:  She denied recurrent episodes of binge eating and inappropriate compensatory behaviors.   History of binge-eating episodes:  She denied eating excessive amounts of food within a discrete time period with a lack of control during eating.     Family history of psychiatric illness:  Her father had schizophrenia and psychosis.    Social history (marriage, employment, etc.):  Ms. Nayak was born in [place], [place] and raised in [place], [place] by biological mother and grandmother along with two siblings (brother and sister).  Her father was not involved in her upbringing.  She described her childhood as normal.  She denied childhood trauma and abuse.  She did average in school and earned her GED at age 24 after dropping out in the 11th grade.  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  She has been on disability for fibromyalgia for the past three years.  She has been  to her  for 13 years.  She reports her marriage is on-again, off-again and they have  numerous times in the past.  At this time, they have rekindled their relationship for the past two months.  She has one 25-year-old son and one 21-year-old daughter.  She currently lives with her  and son.  Voodoo is  important to her and she identifies as Evangelical.    Current psychosocial stressors:  Sometimes when I get on the scale and I dont see the weight go down, but it dont last long.    Report of coping skills:  I call someone, like my mom or daughter.  She enjoys being around her family, shopping, and listening to music.    Support system:  Family    Legal history: Denied.    Substance use:  Alcohol: Denied current use or currently not drinking at all in anticipation for surgery; denied history of abuse or dependency.   Drugs: Denied current use; denied history of abuse or dependency.  Tobacco:  She is currently smoking about five cigarettes per day, and she has cut back from smoking one pack per day.  She started smoking cigarettes when she was 31 years old.  She stopped smoking cigarettes in 2016 when she had the sleeve gastrectomy, but started back again in September 2017 when her aunt passed away and she was around cousins who smoked.  Caffeine: None, working on cutting back in anticipation for surgery.    Current medications and drug reactions:  see medication list.    Strengths and liabilities:  Strength: Patient accepts guidance/feedback, Strength: Patient is expressive/articulate., Strength: Patient is motivated for change., Strength: Patient has positive support network., Strength: Patient has reasonable judgment., Strength: Patient is stable., Liability: Patient has poor health.    Mental Status Exam:   General appearance:  appears stated age, neatly dressed, well groomed  Speech:  normal rate and tone  Level of cooperation:  cooperative  Thought processes:  logical, goal-directed  Mood:  euthymic (Im in a good mood.  I was nervous at first, but Im in a good mood though.)  Thought content:  no illusions, no visual hallucinations, no auditory hallucinations, no delusions, no active or passive homicidal thoughts, no active or passive suicidal ideation, no obsessions, no compulsions, no  violence  Affect:  appropriate  Orientation:  oriented to person, place, and date  Memory:  Recent memory:  3 of 3 objects after brief delay.    Remote memory - intact  Attention span and concentration:  spelled HOUSE forward and backwards  Fund of general knowledge: 3 of 4 recent presidents  Abstract reasoning:    Similarities: abstract.    Proverbs: abstract.  Judgment and insight: fair  Language:  intact    Diagnostic Impression:    ICD-10-CM ICD-9-CM   1. Preop examination Z01.818 V72.84   2. Morbid obesity due to excess calories E66.01 278.01   3. MICHELINE on CPAP G47.33 327.23    Z99.89 V46.8   4. Essential hypertension I10 401.9   5. Hyperlipidemia, unspecified hyperlipidemia type E78.5 272.4   6. Fibromyalgia syndrome M79.7 729.1   7. S/P laparoscopic sleeve gastrectomy Z98.84 V45.86   8. BMI 45.0-49.9, adult Z68.42 V85.42   9. Depression, unspecified depression type F32.9 311   10. Anxiety F41.9 300.00   11. Insomnia, unspecified type G47.00 780.52       Summary/Conclusion:   Although the patient has a significant history of depression, anxiety, and insomnia, there are no overt psychological contraindications for proceeding with bariatric surgery.  The patient reports no current psychiatric problems or major adjustment issues, and it appears her symptoms are well managed with psychotropic medication prescribed by her PCP.  There are no recommendations for psychological treatment at this time. The patient is aware of resources available should her needs change in the future.  The patient has reasonable expectations for surgery, good social support, and has already begun making appropriate lifestyle changes in anticipation for surgery.  Still, the patient may not be considered a suitable candidate at this time due to strict revision criteria and her continued use of tobacco.  While she has cut down her use to five cigarettes per day, she acknowledges that she will not be considered a candidate until she  demonstrates consistent abstinence.  She also understands that due to pursuing revision bariatric surgery, she will be required to demonstrate consistent commitment to the bariatric process.  Based on her reports, she has started to make necessary behavioral changes and appears willing to comply with long-term lifestyle changes.  She notes that she is willing to be on hold until she abstains from tobacco and will continue working with the bariatric dietician at that time.  She may be considered as a candidate for revision bariatric surgery after completing the recommended work-up and lifestyle changes (including a negative nicotine test and adherence to the bariatric diet).    Recommendations:  -This patient may be psychologically cleared to proceed with bariatric surgery once she a) abstains from smoking cigarettes, b) obtains nutritional clearance, and c) demonstrates consistent adherence to the bariatric process and lifestyle changes.  It is recommended she continue taking psychotropic medication as prescribed.    Please see Psychological Testing report available in Notes tab of Chart Review in Epic for results of psychological testing.      Length of Session:  55 minutes

## 2018-02-26 NOTE — Clinical Note
While test results show no overt psychological contraindications for surgery, Ms. Nayak may not be considered a suitable candidate at this time due to strict revision criteria and her continued use of tobacco.  Ms. Naayk may be psychologically cleared to proceed with bariatric surgery once she a) abstains from smoking cigarettes, b) obtains nutritional clearance, and c) demonstrates consistent adherence to the bariatric process and lifestyle changes.  She understands these conditions and is willing to remain on hold and work with Ms. Lees until they are met.    Let me know if you need me to see her again prior to surgery.  However, if she fulfills the conditions I think she will be a suitable candidate.   Thanks!

## 2018-02-26 NOTE — PSYCH TESTING
[facility] [address] [place]  [address]  [phone]    REPORT OF PSYCHOLOGICAL TESTING    NAME: Karine Nayak  OC #: [id number]  : 1970    REFERRED BY: Vitaliy Hoskins Jr., M.D.    EVALUATED BY:  Cinthia Brink, Ph.D., Clinical Psychologist  ASHLEY Arshad, Psychometrician    DATES OF EVALUATION: 2018, 2018    EVALUATION PROCEDURES AND TIMES:  Conducted by Psychologist:  Interpretation and report of test data  Integration of information from diagnostic interview, medical record, and testing data  Conducted by Technician:  [place] Multiphasic Personality Inventory - 2 - Restructured Form (MMPI-2-RF)  [facility] (MBMD)  CPT Codes and Time:  40726 - 2 hours; 95696 - 2 hours    EVALUATION FINDINGS:  Before this evaluation was initiated, the purposes and process of the assessment and the limits of confidentiality were discussed with the patient who expressed understanding of these issues and orally consented to proceed with the evaluation.    Ms. Karine Nayak is a 48-year-old Black  female who is pursuing revision bariatric surgery to improve her health and quality of life.  Ms. Nayak previously had a sleeve gastrectomy on 2016 with Dr. Estevez at [facility].  She reports complications post-surgery with esophageal perforation on 16 which required a six-week hospital stay.  She also had pneumonia for two months after her hospitalization.  Her lowest weight was 277 lbs. and she has been regaining weight since that time with bad acid reflux.  In her initial consult with Laurel Montana PA-C, on  she was considered not a candidate for Bariatric Surgery at this time due to daily tobacco use. [] If she is able to quit smoking and complete the work up she may become a good candidate.    Ms. Nayak has struggled with weight since she starting having children (around age 22).  Factors that have contributed to  her weight gain over the years include:  working nights and snacking, junk foods, fast food, bread, and large portions (especially when stressed out - going back for seconds).  In addition, Ms. Nayak acknowledges that she is an emotional eater, with stress and boredom as her primary emotional triggers to overeat.  The last time she engaged in emotional eating was two months ago when she and her  , and she had seconds.  She is working on increasing her coping mechanisms for stress, including walking on the treadmill and watching her diet.  She has tried many weight loss methods on her own (i.e., Adipex, diet pills, diet modification, gastric sleeve) with some success (down to 277 lbs. after gastric sleeve), and believes that her biggest weight loss challenge is getting over what happened to me in the hospital - I was depressed from it for a long time.  Even though I watched what I was eating and couldnt overeat, I stayed in a down mood.  I had to pray about it and leave it behind.  She reports she was still losing weight at that time, but got to a plateau and stopped.  Her motivation for seeking surgery now is to improve quality of life.  Her postsurgical goals include being there for my [future] granddaughter, thats whats got me really amped up about losing weight.  She would like to improve health conditions as well.      Ms. Nayak has a history of depression, anxiety, insomnia, and questionable Obsessive-Compulsive Disorder (OCD) that is well-managed with Xanax, Abilify, and Cymbalta prescribed by her PCP.  At this time, it does not appear she requires additional psychological treatment.  She denied any history of suicidality or non-suicidal self-injury.  She denied eating disorder symptoms.      At her initial consultation with Laurel Montana PA-C, on 02/08/2018, her BMI was 48.99. Her medical comorbidities include: MCIHELINE, Essential hypertension; Hyperlipidemia; Chronic pain;  Fibromyalgia.  She completed a nutritional consultation with Sandra Lees RD, bariatric dietician, and reports that she has made many changes to her diet since beginning the program.  She has a good understanding regarding the risks and benefits of the procedure and appears motivated for change.  She was fully cooperative and engaged in the assessment process.     Ms. Nayak produced an interpretable MMPI-2-RF profile.  Of note, validity scale results suggest Ms. Nayak tended to endorse items that may be considered infrequent as compared to the population.  This combination of responses may occur in those with substantial medical problems, but it could also reflect exaggeration.  Interpretations should be made with these issues in mind.  Ms. Nayak reports pervasive, multiple somatic complaints including head pain, vague neurological complaints, and a number of gastrointestinal problems.  She is likely prone to developing physical symptoms in response to stress.  She is likely to be preoccupied with poor health and to complain of feeling weak, sleep disturbance, fatigue, low energy, and sexual dysfunction.  Regarding emotions, Ms. Nayak reports a lack of positive emotional experiences and lack of interest.  She reports feeling anxious and is likely to experience significant anxiety-related problems like intrusive ideation and nightmares.  She may have multiple specific fears of certain animals or acts of nature.  Regarding others, Ms. Nayak reports not enjoying social events and avoiding social situations.  She is likely to be introverted, have difficulty forming close relationships, and be emotionally restricted.    (Of note, Ms. Nayak does not believe she has a lack of positive emotional experiences.  She also denied anxiety-related intrusive thoughts and nightmares.  She does acknowledge fears of snakes, rats, and hurricanes)    The MBMD indicated that Ms. Nayak is not reporting any significant psychiatric distress at  this time.  She is inclined to conform to social expectations and requests.  Her pleasant front may cover tension and restraint, and denial of emotions may lead to physical discomfort.  Once an illness is diagnosed, she will likely want explicit medical instructions and is likely to carry them out faithfully.  She has strong spiritual olivier that may be an asset in her recovery.  She is likely to be cooperative and responsive to healthcare recommendations.  According to test data, psychological factors are unlikely to contribute to excessive medical complications for Ms. Nayak.  Her responses suggest that, compared to other patients seeking bariatric surgery, she is likely to experience an improved quality of life and functioning after surgery, and she is likely to follow through on making behavioral changes that will lead to optimal surgery results. A nutritional instruction plan may benefit Ms. Nayak post-surgery, but psychological intervention is not necessary at this time.  (Of note, Ms. Nayak feels comfortable expressing her emotions to her mother and daughter.)    DIAGNOSTIC IMPRESSIONS:    ICD-10-CM ICD-9-CM   1. Preop examination Z01.818 V72.84   2. Morbid obesity due to excess calories E66.01 278.01   3. MICHELINE on CPAP G47.33 327.23    Z99.89 V46.8   4. Essential hypertension I10 401.9   5. Hyperlipidemia, unspecified hyperlipidemia type E78.5 272.4   6. Fibromyalgia syndrome M79.7 729.1   7. S/P laparoscopic sleeve gastrectomy Z98.84 V45.86   8. BMI 45.0-49.9, adult Z68.42 V85.42   9. Depression, unspecified depression type F32.9 311   10. Anxiety F41.9 300.00   11. Insomnia, unspecified type G47.00 780.52       SUMMARY AND RECOMMENDATIONS:  Ms. Nayak has a long history of weight problems and is pursuing revision bariatric surgery at this time in an effort to improve her health and quality of life.  Test results should be considered valid, and indicate that she reports no current psychiatric symptoms or adjustment  problems.  Although she has a history of depression, anxiety, and insomnia, her symptoms appear to be well managed with psychotropic medication prescribed by her PCP.  It is recommended she continue taking psychotropic medication as prescribed, and additional psychological intervention does not appear necessary at this time.  She reports good social support, demonstrates several characteristics that make her a good candidate for surgery, and testing suggests that she will benefit in multiple ways from bariatric surgery.  While test results show NO overt psychological contraindications for surgery, Ms. Nayak may not be considered a suitable candidate at this time due to strict revision criteria and her continued use of tobacco.  Ms. Nayak may be psychologically cleared to proceed with bariatric surgery once she a) abstains from smoking cigarettes, b) obtains nutritional clearance, and c) demonstrates consistent adherence to the bariatric process and lifestyle changes.

## 2018-03-05 ENCOUNTER — DOCUMENTATION ONLY (OUTPATIENT)
Dept: BARIATRICS | Facility: CLINIC | Age: 48
End: 2018-03-05

## 2018-03-19 ENCOUNTER — CLINICAL SUPPORT (OUTPATIENT)
Dept: BARIATRICS | Facility: CLINIC | Age: 48
End: 2018-03-19
Payer: MEDICARE

## 2018-03-19 VITALS — WEIGHT: 293 LBS | BODY MASS INDEX: 47.87 KG/M2

## 2018-03-19 DIAGNOSIS — E66.01 MORBID OBESITY WITH BMI OF 45.0-49.9, ADULT: ICD-10-CM

## 2018-03-19 DIAGNOSIS — Z71.3 DIETARY COUNSELING: ICD-10-CM

## 2018-03-19 DIAGNOSIS — G47.33 OSA ON CPAP: ICD-10-CM

## 2018-03-19 DIAGNOSIS — I10 ESSENTIAL HYPERTENSION: ICD-10-CM

## 2018-03-19 PROCEDURE — 97803 MED NUTRITION INDIV SUBSEQ: CPT | Mod: PBBFAC | Performed by: DIETITIAN, REGISTERED

## 2018-03-19 PROCEDURE — 99499 UNLISTED E&M SERVICE: CPT | Mod: S$PBB,,, | Performed by: DIETITIAN, REGISTERED

## 2018-03-19 PROCEDURE — 99999 PR PBB SHADOW E&M-EST. PATIENT-LVL II: CPT | Mod: PBBFAC,,, | Performed by: DIETITIAN, REGISTERED

## 2018-03-19 PROCEDURE — 99212 OFFICE O/P EST SF 10 MIN: CPT | Mod: PBBFAC | Performed by: DIETITIAN, REGISTERED

## 2018-03-19 NOTE — PATIENT INSTRUCTIONS
Meal Ideas for Regular Bariatric Diet  *Recipes and products available at [email or web address]      Breakfast: (15-20g protein)    - Egg white omelet: 2 egg whites or ½ cup Egg Beaters. (Optional proteins: cheese, shrimp, black beans, chicken, sliced turkey) (Optional veggies: tomatoes, salsa, spinach, mushrooms, onions, green peppers, or small slice avocado)     - Egg and sausage: 1 egg or ¼ cup Egg Beaters (any variety), with 1 olimpia or 2 links of Turkey sausage or Veggie breakfast sausage (ArthaYantra or ProfitPoint)    - Crust-less breakfast quiche: To make a glass pie dish, mix 4oz part skim Ricotta, 1 cup skim milk, and 2 eggs as your base. Add protein: shredded cheese, sliced lean ham or turkey, turkey chen/sausage. Add veggies: tomato, onion, green onion, mushroom, green pepper, spinach, etc.    - Yogurt parfait: Mix 1 - 6oz container Dannon Light N Fit vanilla yogurt, with ¼ cup crushed unsalted nuts    - Cottage cheese and fruit: ½ cup part-skim cottage cheese or ricotta cheese topped with fresh fruit or sugar free preserves     - Kirti Garcia's Vanilla Egg custard* (add 2 Tbsp instant coffee granules to make Cappuccino Custard*)    - Hi-Protein café latte (skim milk, decaf coffee, 1 scoop protein powder). Optional to add Sugar free syrup or extract flavoring.    - Breakfast Lox: spread fat free cream cheese on slices of smoked salmon. Serve over scrambled or egg over easy (sauteed with nonstick cookspray) OR on a cucumber slice    - Eggwhich: Scramble or cook 1 large egg over easy using nonstick cookspray. Place between 2 slices of Ugandan chen and low fat cheese.     Lunch: (20-30g protein)    - ½ cup Black bean soup (Homemade or Progresso), with ¼ cup shredded low-fat cheese. Top with chopped tomato or fresh salsa.     - Lean deli turkey breast and low-fat sliced cheese, mustard or light apl to moisten, rolled up together, or wrapped in a Buck lettuce leaf    - Chicken salad made from dinner  leftovers, moisten with low-fat salad dressing or light pal. Also try leftover salmon, shrimp, tuna or boiled eggs. Serve ½ cup over dark green salad    - Fat-free canned refried beans, topped with ¼ cup shredded low-fat cheese. Top with chopped tomato or fresh salsa.     - Greek salad: Top mixed greens with 1-2oz grilled chicken, tomatoes, red onions, 2-3 kalamata olives, and sprinkle lightly with feta cheese. Spritz with Balsamic vinegar to taste.     - Crust-less lunch quiche: To make a glass pie dish, mix 4oz part skim Ricotta, 1 cup skim milk, and 2 eggs as your base. Add protein: shredded cheese, sliced lean ham or turkey, shrimp, chicken. Add veggies: tomato, onion, green onion, mushroom, green pepper, spinach, artichoke, broccoli, etc.    - Pizza bake: spread a  horacio lorne mushroom with tomato sauce, low-fat shredded mozzarella and turkey pepperoni or [place] chen. Add any veggies. Roast for 10-15 minutes, until cheese melted.     - Cucumber crab bites: Spread ¼ cup crab dip (lump crabmeat + light cream cheese and green onions) over sliced cucumber.     - Chicken with light spinach and artichoke dip*: Puree in : 6oz cooked and drained spinach, 2 cloves garlic, 1 can cannelloni beans, ½ cup chopped green onions, 1 can drained artichoke hearts (not marinated in oil), lemon juice and basil. Mix in 2oz chopped up chicken.    Supper: (20-30g protein)    - Serve grilled fish over dark green salad tossed with low-fat dressing, served with grilled asparagus plata     - Rotisserie chicken salad: served with sliced strawberries, walnuts, fat-free feta cheese crumbles and 1 tbsp Ojedas Own Light Raspberry [place] Vinaigrette    - Shrimp cocktail: Dip cold boiled shrimp in homemade low-sugar cocktail sauce (1/2 cup Katia One Carb ketchup, 2 tbsp horseradish, 1/4 tsp hot sauce, 1 tsp Worcestershire sauce, 1 tbsp freshly-squeezed lemon juice). Serve with dark green salad, walnuts, and crumbled blue  cheese drizzled with olive oil and Balsamic vinegar    - Tuna Melt: Spread tuna salad onto 2 thick slices of tomato. Top with low-fat cheese and broil until cheese is melted. May also be made with chicken salad of shrimp salad. [place] with different types of cheeses.    - Chicken or beef fajitas (no tortilla, rice, beans, chips). Top meat and veggies w/ fresh salsa, fat free sour cream.     - Homemade low-fat Chili using extra lean ground beef or ground turkey. Top with shredded cheese and salsa as desired. May add dollop fat-free sour cream if desired    - Chicken parmesan: Top chicken breast w/ low sugar marinara sauce, mozzarella cheese and bake until chicken reaches 165*.  Serve w/ spaghetti SQUASH or [place] Islander cut green beans    - Dinner Omelet with shrimp or chicken and onion, green peppers and chives.    - No noodle lasagna: Use sliced zucchini or eggplant in place of noodles.  Layer with part skim ricotta cheese and low sugar meat sauce (use very lean ground beef or ground turkey).    - Mexican chicken bake: Bake chunks of chicken breast or thigh with taco seasoning, Pace brand enchilada sauce, green onions and low-fat cheese. Serve with ¼ cup black beans or fat free refried beans topped with chopped tomatoes or salsa.    - Leslie frozen meatballs, simmered in Classico Marinara sauce. Different flavors of salsa or spaghetti sauce create different dishes! Sprinkle with parmesan cheese. Serve with grilled or steamed veggies, or a dark green salad.    - Simmer boneless skinless chicken thigh chunks in Classico Marinara sauce or roasted salsa until tender with chopped onion, bell pepper, garlic, mushrooms, spinach, etc.     - Hamburger or veggie burger, without the bun, dressed the way you like. Served with grilled or steamed veggies.    - Eggplant parmesan: Bake slices of eggplant at 350 degrees for 15 minutes. Layer tomato sauce, sliced eggplant and low-fat mozzarella cheese in a baking dish and cover with  foil. Bake 30-40 more minutes or until bubbly. Uncover and bake at 400 degrees for about 15 more minutes, or until top is slightly crisp.    - Fish tacos: grilled/baked white fish, wrapped in Buck lettuce leaf, topped with salsa, shredded low-fat cheese, and light coleslaw.    - Chicken rick: Sprinkle chicken w/ 1 tsp of hidden valley ranch dip mix. Then grill chicken and top with black beans, salsa and 1 tsp fat free sour cream.     - Cauliflower pizza crust: Use cauliflower as crust (see recipe on pinterest, no flour!). Top w/ low fat cheese, turkey pepperoni and veggies and bake again    - chicken or turkey crust pizza: use ground chicken or turkey instead of cauliflower, spread in [place] and bake at 350 for about 20-30 minutes(may want to add garlic, black pepper, oregano and other herbs to ground meat mixture).  Remove and top w/ low fat cheese, turkey pepperoni and veggies and bake again for another 10 minutes or until cheese is browned.     Snacks: (100-200 calories; >5g protein)    - 1 low-fat cheese stick with 8 cherry tomatoes or 1 serving fresh fruit  - 4 thin slices fat-free turkey breast and 1 slice low-fat cheese  - 4 thin slices fat-free honey ham with wedge of melon  - 6-8 edamame pods (equivalent to about 1/4 cup edamame without pods).   - 1/4 cup unsalted nuts with ½ cup fruit  - 6-oz container Dannon Light n Fit vanilla yogurt, topped with 1oz unsalted nuts         - apple, celery or baby carrots spread with 2 Tbsp PB2  - apple slices with 1 oz slice low-fat cheese  - Apple slices dipped in 2 Tbsp of PB2  - celery, cucumber, bell pepper or baby carrots dipped in ¼ cup hummus bean spread or light spinach and artichoke dip (*recipe in lunch section)  - celery, cucumber, baby carrots dipped in high protein greek yogurt (Mix 16 oz plain greek yogurt + 1 packet of hidden valley ranch dip mix)  - Mingo Links Beef Steak - 14g protein! (similar to beef jerky)  - 2 wedges Laughing Cow - Light Herb  & Garlic Cheese with sliced cucumber or green bell pepper  - 1/2 cup low-fat cottage cheese with ¼ cup fruit or ¼ cup salsa  - RTD Protein drinks: Atkins, Low Carb Slim Fast, EAS light, Muscle Milk Light, etc.  - Homemade Protein drinks: GNC Soy95, Isopure, Nectar, UNJURY, Whey Gourmet, etc. Mix 1 scoop powder with 8oz skim/1% milk or light soymilk.  - Protein bars: Atkins, EAS, Pure Protein, Think Thin, Detour, etc. Must have 0-4 grams sugar - Read the label.    Takeout Options: No more than twice/week  Deli - Salads (no pasta or rice), meats, cheeses. Roasted chicken. Lox (salmon)    Mexican - Platters which don't include tortillas, chips, or rice. Go easy on the beans. Example: Fajitas without the tortillas. Ask the  not to bring chips to the table if they are too tempting.    Greek - Meat or fish and vegetable, but no bread or rice. Including hummus, baba ganoush, etc, is OK. Most sit-down Greek restaurants can provide you with cucumber slices for dipping instead of whitney bread.    Fast Food (Avoid as much as possible) - Salads (no croutons and limit salad dressing to 2 tbsp), grilled chicken sandwich without the bun and ask for no pal. Littles low fat chili or Taco Bell pintos and cheese.    BBQ - The meats are fine if you ask for sauces on the side, but most of the traditional side dishes are loaded with carbs. Triston slaw, baked beans and BBQ sauce are typically made with sugar.    Chinese - Nothing deep-fried, no rice or noodles. Many Chinese sauces have starch and sugar in them, so you'll have to use your judgement. If you find that these sauces trigger cravings, or cause Dumping, you can ask for the sauce to be made without sugar or just use soy sauce.

## 2018-03-19 NOTE — PROGRESS NOTES
NUTRITION NOTE    Referring Physician: Vitaliy Hoskins M.D.  Reason for MNT Referral: 3 month Medically Supervised Diet pending conversion from Sleeve to Gastric Bypass    Patient presents for 2nd visit for MSD with 3 lbs weight loss over the past month by making numerous dietary and lifestyle changes. She is no longer skipping meals. She is putting a lot of effort into meal planning, shopping, and cooking. Most beverages are water or CL. She is still drinking coffee with sugar. Still smoking, but has reduced amount.    Past Medical History:   Diagnosis Date    SUZIE (acute kidney injury)     Anemia     Anxiety     Fibromyalgia     Hyperlipidemia     Hypertension     Inflammatory osteoarthritis     OCD (obsessive compulsive disorder)     MICHELINE on CPAP     Rectocele     Scoliosis     Sycosis     Thyroid disease        CLINICAL DATA:  48 y.o. female.    Current Weight: 310 lbs  Recent Weight Change: - 3 lbs  BMI: 47.8    CURRENT DIET:  Reduced-calorie diet.  Diet Recall: Food records are present.    B: 2 boiled eggs, bowl of instant oatmeal with margarine, 1/2 banana  - Premier shake  L: baked fish with margarine, mashed potatoes, green beans  - Premier shake  D: baked pork chop, boiled potatoes, carrots  - Premier shake    Diet Includes:   Meal Pattern: Improved. 3 meals and 2-3 prot shakes  Protein Supplements: 2-3 per day. Premier  Snacking: Adequate. Snacks include healthy choices.  Vegetables: Likes a variety. Eats daily.  Fruits: Likes a variety. Eats 2-3 times per week.  Beverages: water, sugar-free beverages and coffee with sugar. Coffee with sf powder creamer + 2 tsp sugar, 2-3 times/week.  Dining Out: None   Cooking at home: Daily. Mostly baked, grilled and smothered meat, fish, starchy CHO and vegetables.    CURRENT EXERCISE:  Adequate. Walking on treadmill on incline 20 min, 3-4 times/week.    Vitamins / Minerals / Herbs:   FC, Ca Citrate, Vit D, B12     Social:  Grocery shopping and food prep  done by the pt.  Patient believes the household may not be supportive after surgery. She may have to cook separate meals.  Alcohol: None.  Smokin pp week. Was 3-4 packs per week.    ASSESSMENT:  Patient demonstrates willingness to change lifestyle habits as evidenced by weight loss, good exercise, daily food logs, dietary changes, including protein drinks, increased fruits, increased vegetables, healthier cooking at home and healthier snacking at home.    Doing well with working on greatest challenges (smoking and eating starchy CHO).    Excess protein intake some days, with 3 prot meals and 3 protein shakes.    PLAN:    Diet: Adjust diet plan.  - Continue to work on eliminating starchy CHO  - Try cooking cauliflower in the boil to replace potatoes  - Protein 80-120g per day. 2 prot shakes most days should work.    Exercise: Maintain.    Behavior Modification: Continue to document food & activity logs daily.  - Keep working on quitting smoking    Return to clinic in one month.    SESSION TIME:  30 minutes

## 2018-04-23 ENCOUNTER — CLINICAL SUPPORT (OUTPATIENT)
Dept: BARIATRICS | Facility: CLINIC | Age: 48
End: 2018-04-23
Payer: MEDICARE

## 2018-04-23 VITALS — BODY MASS INDEX: 47.32 KG/M2 | WEIGHT: 293 LBS

## 2018-04-23 DIAGNOSIS — Z98.84 S/P BARIATRIC SURGERY: ICD-10-CM

## 2018-04-23 DIAGNOSIS — E66.01 MORBID OBESITY WITH BMI OF 45.0-49.9, ADULT: ICD-10-CM

## 2018-04-23 DIAGNOSIS — I10 ESSENTIAL HYPERTENSION: ICD-10-CM

## 2018-04-23 DIAGNOSIS — Z71.3 DIETARY COUNSELING: ICD-10-CM

## 2018-04-23 DIAGNOSIS — G47.33 OSA ON CPAP: ICD-10-CM

## 2018-04-23 PROCEDURE — 99499 UNLISTED E&M SERVICE: CPT | Mod: S$PBB,,, | Performed by: DIETITIAN, REGISTERED

## 2018-04-23 PROCEDURE — 97803 MED NUTRITION INDIV SUBSEQ: CPT | Mod: PBBFAC | Performed by: DIETITIAN, REGISTERED

## 2018-04-23 NOTE — Clinical Note
Needs nicotine test scheduled for 9:30am on May 24 if possible. Also, says not cleared by elly, but psych says she is cleared pending RD clearance. Very confusing. Does she need to go back to get clearance from psych?

## 2018-04-23 NOTE — PROGRESS NOTES
NUTRITION NOTE     Referring Physician: Vitaliy Hoskins M.D.  Reason for MNT Referral: 3 month Medically Supervised Diet pending conversion from Sleeve to Gastric Bypass     Patient presents for 3rd visit for MSD with 4 lbs weight loss over the past month by making numerous dietary and lifestyle changes. She is no longer skipping meals. She is putting a lot of effort into meal planning, shopping, and cooking. Most beverages are water or CL. She is still drinking coffee with sugar. Greatest challenge is craving bread, but she stuck to the diet and did lettuce wraps. Still smoking, but has reduced greatly. Down to 1-2 cigarettes per day now. Plans to quit this week.          Past Medical History:   Diagnosis Date    SUZIE (acute kidney injury)      Anemia      Anxiety      Fibromyalgia      Hyperlipidemia      Hypertension      Inflammatory osteoarthritis      OCD (obsessive compulsive disorder)      MICHELINE on CPAP      Rectocele      Scoliosis      Sycosis      Thyroid disease           CLINICAL DATA:  48 y.o. female.     Current Weight: 306 lbs  Recent Weight Change: - 7 lbs  BMI: 47.3     CURRENT DIET:  Reduced-calorie diet.  Diet Recall: Food records are present.     B: 2 boiled eggs, bowl of instant oatmeal with margarine, 1/2 banana  - Premier shake  L: baked fish with margarine, mashed potatoes, green beans  - Premier shake  D: baked pork chop, boiled potatoes, carrots  - Premier shake     Diet Includes:   Meal Pattern: Improved. 3 meals and 2-3 prot shakes  Protein Supplements: 2-3 per day. Premier  Snacking: Adequate. Snacks include healthy choices.  Vegetables: Likes a variety. Eats daily.  Fruits: Likes a variety. Eats 2-3 times per week.  Beverages: water, sugar-free beverages and coffee with sugar. Coffee with sf powder creamer + 2 tsp sugar, 2-3 times/week.  Dining Out: None   Cooking at home: Daily. Mostly baked, grilled and smothered meat, fish, starchy CHO and vegetables.     CURRENT  EXERCISE:  Adequate. Walking on treadmill on incline 20 min, 3-4 times/week.     Vitamins / Minerals / Herbs:   FC, Ca Citrate, Vit D, B12     Social:  Grocery shopping and food prep done by the pt.  Patient believes the household may not be supportive after surgery. She may have to cook separate meals.  Alcohol: None.  Smokin pp week. Was 3-4 packs per week.     ASSESSMENT:  Patient demonstrates willingness to change lifestyle habits as evidenced by weight loss, good exercise, daily food logs, dietary changes, including protein drinks, increased fruits, increased vegetables, healthier cooking at home and healthier snacking at home.     Doing well with working on greatest challenges (smoking and eating starchy CHO).     PLAN:     Diet: Maintain diet plan.    Exercise: Maintain or increase to 30 min     Behavior Modification: Continue to document food & activity logs daily.  - Pt plans to quit smoking this week. Nicotine test in one month.     Return to clinic in one month for nicotine test and diet review.     SESSION TIME:  30 minutes

## 2018-05-24 ENCOUNTER — CLINICAL SUPPORT (OUTPATIENT)
Dept: BARIATRICS | Facility: CLINIC | Age: 48
End: 2018-05-24
Payer: MEDICARE

## 2018-05-24 VITALS — BODY MASS INDEX: 49.63 KG/M2 | WEIGHT: 293 LBS

## 2018-05-24 DIAGNOSIS — E66.01 MORBID OBESITY WITH BMI OF 45.0-49.9, ADULT: ICD-10-CM

## 2018-05-24 DIAGNOSIS — G47.33 OSA ON CPAP: ICD-10-CM

## 2018-05-24 DIAGNOSIS — I10 ESSENTIAL HYPERTENSION: ICD-10-CM

## 2018-05-24 DIAGNOSIS — Z71.3 DIETARY COUNSELING: ICD-10-CM

## 2018-05-24 PROCEDURE — 97803 MED NUTRITION INDIV SUBSEQ: CPT | Mod: PBBFAC | Performed by: DIETITIAN, REGISTERED

## 2018-05-24 PROCEDURE — 99999 PR PBB SHADOW E&M-EST. PATIENT-LVL I: CPT | Mod: PBBFAC,,, | Performed by: DIETITIAN, REGISTERED

## 2018-05-24 PROCEDURE — 99211 OFF/OP EST MAY X REQ PHY/QHP: CPT | Mod: PBBFAC | Performed by: DIETITIAN, REGISTERED

## 2018-05-24 PROCEDURE — 99499 UNLISTED E&M SERVICE: CPT | Mod: S$PBB,,, | Performed by: DIETITIAN, REGISTERED

## 2018-05-24 NOTE — PROGRESS NOTES
NUTRITION NOTE     Referring Physician: Vitaliy Hoskins M.D.  Reason for MNT Referral: Follow-up Assessment pending conversion from Sleeve to Gastric Bypass     Patient presents for 4th visit for MSD with 15 lbs weight gain over the past month. She quit smoking April 24, 2018, one month ago. Admits to eating bigger portions of food and snacking in between meals to keep from smoking. No sugary drinks. Most beverages are water or CL. She is no longer drinking coffee with sugar. She ran out of her Premier shakes for 2 weeks of the month.             Past Medical History:   Diagnosis Date    SUZIE (acute kidney injury)      Anemia      Anxiety      Fibromyalgia      Hyperlipidemia      Hypertension      Inflammatory osteoarthritis      OCD (obsessive compulsive disorder)      MICHELINE on CPAP      Rectocele      Scoliosis      Sycosis      Thyroid disease           CLINICAL DATA:  48 y.o. female.     Current Weight: 321 lbs  Recent Weight Change: + 8 lbs  BMI: 49.6     CURRENT DIET:  Regular diet.  Diet Recall: Food records are present.     B: steak and 2 scrambled eggs  - 2 scrambled eggs with cheese  L: baked turkey, steamed broccoli  - 2nds  - cottage cheese, canned peaches in syrup  D: hamburger olimpia with cheese or fish and salad  - 2nds  - apple or banana     Diet Includes:   Meal Pattern: Improved. 3 meals and 2-3 prot shakes  Protein Supplements: Premier  Snacking: Adequate. Snacks include healthy choices.  Vegetables: Likes a variety. Eats daily.  Fruits: Likes a variety. Eats 2-3 times per week.  Beverages: water, sugar-free beverages   Dining Out: None   Cooking at home: Daily. Mostly baked, grilled and smothered meat, fish, starchy CHO and vegetables.     CURRENT EXERCISE:  Adequate. Walking on treadmill on incline 20-30 min, 3-4 times/week.     Vitamins / Minerals / Herbs:   FC, Ca Citrate, Vit D, B12     Social:  Grocery shopping and food prep done by the pt.  Patient believes the household may  not be supportive after surgery. She may have to cook separate meals.  Alcohol: None.  Smokin pp week. Was 3-4 packs per week.     ASSESSMENT:  Patient demonstrates willingness to change lifestyle habits as evidenced by weight loss, good exercise, daily food logs, dietary changes, including protein drinks, increased fruits, increased vegetables, healthier cooking at home and healthier snacking at home.     Doing well with working on greatest challenges (smoking and eating starchy CHO).     PLAN:     Diet: Maintain diet plan. Limit portions if possible. Eat more veggies if wanting 2nds.     Exercise: Maintain or increase to 30 min     Behavior Modification: Continue to document food & activity logs daily.  - continue no smoking     Return to clinic in one month for nicotine test and diet review.     SESSION TIME:  30 minutes

## 2018-05-30 ENCOUNTER — TELEPHONE (OUTPATIENT)
Dept: BARIATRICS | Facility: CLINIC | Age: 48
End: 2018-05-30

## 2018-05-30 DIAGNOSIS — Z87.891 FORMER SMOKER: Primary | ICD-10-CM

## 2018-05-30 NOTE — TELEPHONE ENCOUNTER
----- Message from Sandra Lees sent at 5/25/2018  3:12 PM CDT -----  Regarding: nicotine test  Can you please schedule a nicotine test same day as pt's next RD appt 7/9/18

## 2018-07-09 ENCOUNTER — LAB VISIT (OUTPATIENT)
Dept: LAB | Facility: HOSPITAL | Age: 48
End: 2018-07-09
Attending: SURGERY
Payer: MEDICARE

## 2018-07-09 ENCOUNTER — CLINICAL SUPPORT (OUTPATIENT)
Dept: BARIATRICS | Facility: CLINIC | Age: 48
End: 2018-07-09
Payer: MEDICARE

## 2018-07-09 VITALS — WEIGHT: 293 LBS | BODY MASS INDEX: 50.76 KG/M2

## 2018-07-09 DIAGNOSIS — Z71.3 DIETARY COUNSELING: ICD-10-CM

## 2018-07-09 DIAGNOSIS — I10 ESSENTIAL HYPERTENSION: ICD-10-CM

## 2018-07-09 DIAGNOSIS — Z87.891 FORMER SMOKER: ICD-10-CM

## 2018-07-09 DIAGNOSIS — G47.33 OSA ON CPAP: ICD-10-CM

## 2018-07-09 DIAGNOSIS — E66.01 MORBID OBESITY WITH BMI OF 50.0-59.9, ADULT: ICD-10-CM

## 2018-07-09 PROCEDURE — 97803 MED NUTRITION INDIV SUBSEQ: CPT | Mod: PBBFAC | Performed by: DIETITIAN, REGISTERED

## 2018-07-09 PROCEDURE — 99999 PR PBB SHADOW E&M-EST. PATIENT-LVL II: CPT | Mod: PBBFAC,,, | Performed by: DIETITIAN, REGISTERED

## 2018-07-09 PROCEDURE — 99212 OFFICE O/P EST SF 10 MIN: CPT | Mod: PBBFAC | Performed by: DIETITIAN, REGISTERED

## 2018-07-09 PROCEDURE — 36415 COLL VENOUS BLD VENIPUNCTURE: CPT

## 2018-07-09 PROCEDURE — 99499 UNLISTED E&M SERVICE: CPT | Mod: S$PBB,,, | Performed by: DIETITIAN, REGISTERED

## 2018-07-09 NOTE — PROGRESS NOTES
NUTRITION NOTE     Referring Physician: Vitaliy Hoskins M.D.  Reason for MNT Referral: Follow-up Assessment pending conversion from Sleeve to Gastric Bypass     Patient presents for 5th visit with 7 lbs weight gain over the past month. She has gained 22 lbs over the past 2 months since quitting smoking. Admits to eating bigger portions of food, 2nds d/t boredom, and increased snacking in between meals to keep from smoking. No sugary drinks. Most beverages are water or CL. She is back to drinking her Premier shakes, 2-3 per day.             Past Medical History:   Diagnosis Date    SUZIE (acute kidney injury)      Anemia      Anxiety      Fibromyalgia      Hyperlipidemia      Hypertension      Inflammatory osteoarthritis      OCD (obsessive compulsive disorder)      MICHELINE on CPAP      Rectocele      Scoliosis      Sycosis      Thyroid disease           CLINICAL DATA:  48 y.o. female.     Current Weight: 328 lbs  Recent Weight Change: + 8 lbs  BMI: 49.6     CURRENT DIET:  Low carb diet.  Verbal Diet Recall: Food records are not present.     B: 2 scrambled eggs, 2 sausage links or chen OR 3 egg omelet with cheese, coffee with creamer and Splenda  - Premier shake  L: baked chicken/pork chop/fish, canned veg (green beans or mix veg - peas and corn) OR meatsauce with zoodles  - 2nds  - 1/2 cup cottage cheese with canned peaches in syrup  D: smothered chicken over cauli-rice  - 2nds  - apple or banana  - Premier shake     Diet Includes:   Meal Pattern: 3 meals (plus eating 2nds to keep from smoking) and 2-3 prot shakes  Protein Supplements: Premier  Snacking: Adequate. Snacks include healthy choices.  Vegetables: Likes a variety. Eats daily. Cauli-rice and zoodles from the frozen section  Fruits: Likes a variety. Eats daily.  Beverages: water, sugar-free beverages   Dining Out: None. Trang edouardty dressed w/o the bun from fast food.  Cooking at home: Daily. Mostly baked, grilled and smothered meat, fish, and  vegetables.     CURRENT EXERCISE:  None d/t twisted ankle.  Was walking on treadmill on incline 20-30 min, 3-4 times/week.     Vitamins / Minerals / Herbs:   FC, Ca Citrate, Vit D, B12     Social:  Grocery shopping and food prep done by the pt.  Patient believes the household may will be supportive after surgery. Her son brings home take-out fast food because he does not want the food she cooks for herself.  Alcohol: None.  Smoking: None since April 24, 2018. Was smoking 3-4 packs per week.     ASSESSMENT:  Patient demonstrates willingness to change lifestyle habits as evidenced by dietary changes, including protein drinks, increased fruits, increased vegetables, healthier cooking at home and healthier snacking at home.     Doing well with working on greatest challenges (smoking and eating starchy CHO).    Significant weight gain (22 lbs) since quitting smoking. Pt denies eating breads/rice/potatoes/pasta or junk food or sweets or sugary drinks or alcohol. Only admits to eating larger portions and more often d/t boredom and to avoid old habit of smoking after meals.     PLAN:     Diet: Maintain diet plan.   - Limit portions if possible. Eat more veggies if wanting 2nds.  - Either fresh/frozen fruit or canned in juice, not syrup d/t sugar content  - Choose fresh or frozen vegetables and Avoid canned or rinse them off. No corn/peas d/t starchy content.     Exercise: Low impact pool exercises     Behavior Modification: Continue to document food & activity logs daily.  - continue no smoking. Provided handouts: Mindful Eating and Tips to Quit Smoking     Return to clinic in one month.     SESSION TIME:  30 minutes

## 2018-07-09 NOTE — PATIENT INSTRUCTIONS
PLAN:     Diet: Maintain diet plan. Limit portions if possible. Eat more veggies if wanting 2nds.  - Either fresh/frozen fruit or canned in juice, not syrup  - Reduce the canned vegetables or rinse them off. No corn/peas.     Exercise: Low impact pool exercises     Behavior Modification: Continue to document food & activity logs daily.  - continue no smoking. Provided handouts: Mindful Eating and Tips to Quit Smoking     Return to clinic in one month.

## 2018-07-11 LAB
COTININE SERPL-MCNC: <3 NG/ML
NICOTINE SERPL-MCNC: <3 NG/ML

## 2018-08-13 ENCOUNTER — TELEPHONE (OUTPATIENT)
Dept: BARIATRICS | Facility: CLINIC | Age: 48
End: 2018-08-13

## 2018-08-13 NOTE — TELEPHONE ENCOUNTER
----- Message from Stanton Stout sent at 8/10/2018  2:15 PM CDT -----  Pt wants to reschedule her appt that was scheduled for Monday at 12pm. Please call pt at [phone]

## 2018-09-04 ENCOUNTER — TELEPHONE (OUTPATIENT)
Dept: BARIATRICS | Facility: CLINIC | Age: 48
End: 2018-09-04

## 2018-09-04 NOTE — TELEPHONE ENCOUNTER
----- Message from Stanton Stout sent at 9/4/2018  8:11 AM CDT -----  Pt wants to reschedule appt due to the weather. Please call pt at [phone]

## 2018-09-10 ENCOUNTER — DOCUMENTATION ONLY (OUTPATIENT)
Dept: BARIATRICS | Facility: CLINIC | Age: 48
End: 2018-09-10

## 2018-09-10 ENCOUNTER — CLINICAL SUPPORT (OUTPATIENT)
Dept: BARIATRICS | Facility: CLINIC | Age: 48
End: 2018-09-10
Payer: MEDICARE

## 2018-09-10 VITALS — BODY MASS INDEX: 49.84 KG/M2 | WEIGHT: 293 LBS

## 2018-09-10 DIAGNOSIS — Z98.84 S/P BARIATRIC SURGERY: ICD-10-CM

## 2018-09-10 DIAGNOSIS — Z71.3 DIETARY COUNSELING: ICD-10-CM

## 2018-09-10 DIAGNOSIS — E66.01 MORBID OBESITY WITH BMI OF 45.0-49.9, ADULT: ICD-10-CM

## 2018-09-10 DIAGNOSIS — G47.33 OSA ON CPAP: ICD-10-CM

## 2018-09-10 DIAGNOSIS — I10 ESSENTIAL HYPERTENSION: ICD-10-CM

## 2018-09-10 PROCEDURE — 99499 UNLISTED E&M SERVICE: CPT | Mod: S$PBB,,, | Performed by: DIETITIAN, REGISTERED

## 2018-09-10 PROCEDURE — 97803 MED NUTRITION INDIV SUBSEQ: CPT | Mod: PBBFAC | Performed by: DIETITIAN, REGISTERED

## 2018-09-10 NOTE — PROGRESS NOTES
NUTRITION NOTE     Referring Physician: Vitaliy Hoskins M.D.  Reason for MNT Referral: Follow-up Assessment pending conversion from Sleeve to Gastric Bypass     Patient presents for 6th visit with 6 lbs weight loss over the past month. States she started walking more. No sugary drinks. Most beverages are water or CL. She is back to drinking her Premier shakes, 2-3 per day.             Past Medical History:   Diagnosis Date    SUZIE (acute kidney injury)      Anemia      Anxiety      Fibromyalgia      Hyperlipidemia      Hypertension      Inflammatory osteoarthritis      OCD (obsessive compulsive disorder)      MICHELINE on CPAP      Rectocele      Scoliosis      Sycosis      Thyroid disease           CLINICAL DATA:  48 y.o. female.     Current Weight: 322 lbs  Recent Weight Change: + 2 lbs  BMI: 49.8     CURRENT DIET:  Bariatric Low carb diet.  Diet Recall: Food records are present.     B: 3 eggs occ with turkey/ham OR triple zero greek yogurt  Sn: Premier shake OR cottage cheese with fruit   L: leftovers from dinner OR salad with grilled chicken/shrimp/tuna or chicken salad  Sn: Premier shake  D: baked chicken/pork chop/fish with broccoli/cauliflower/cabbage OR meatsauce with zoodles  - Premier shake OR greek yogurt     Diet Includes:   Meal Pattern: 3 meals and 2-3 prot shakes  Protein Supplements: Premier  Snacking: Adequate. Snacks include healthy choices.  Vegetables: Likes a variety. Eats daily. Cauli-rice and zoodles from the frozen section  Fruits: Likes a variety. Eats daily.  Beverages: water, sugar-free beverages   Dining Out: None. Burger olimpia dressed w/o the bun from fast food.  Cooking at home: Daily. Mostly baked, grilled and smothered meat, fish, and vegetables.     CURRENT EXERCISE:  None d/t twisted ankle.  Was walking on treadmill on incline 20-30 min, 3-4 times/week.     Vitamins / Minerals / Herbs:   FC, Ca Citrate, Vit D, B12     Social:  Grocery shopping and food prep done by the  pt.  Patient believes her mother and daughter will be supportive after surgery. Her son brings home take-out fast food because he does not want the food she cooks for herself. She states it doesn't bother her at all.  Alcohol: None.  Smoking: None since April 24, 2018. Was smoking 3-4 packs per week.     ASSESSMENT:  Patient demonstrates willingness to change lifestyle habits as evidenced by dietary changes, including protein drinks, increased fruits, increased vegetables, healthier cooking at home and healthier snacking at home.     Doing well with working on greatest challenges (smoking and eating starchy CHO).     RECOMMENDATION:  Good candidate for bariatric surgery - Conversion from Gastric Sleeve to Gastric Bypass.     SESSION TIME:  30 minutes

## 2018-09-10 NOTE — PROGRESS NOTES
Sandra Marinelli RD, said patient complained of some lightheadedness and blurred vision; but, denies any of s/s or issues.   BP taken - 124/70 and HR of 108.  Patient has taken both her irbesartan -hydrochlorothiazide and Spironolactone this morning.   BP and HR consistent at baseline. Ms Nayak denies headache at this time.  Strength noted equally bilaterally.   Patient denies s/s of dehydration and reports good urine output and yellow in color.  Patient did lose 9 lbs recently and has eliminated grits and sweet tea.  She has not eaten in over 12 hours.  Discussed with ARPAN Anderson, and patient eating a nutrition bar with 19 gms of carbs.  Patient feeling slightly better at the end of the visit with the dietitian.  She recalled  that she took cough medicine and sometimes feels that way after taking it.  Instructed patient to follow up with her PCP regarding medication management while losing weight and to proceed to ER if needed.

## 2018-09-11 ENCOUNTER — TELEPHONE (OUTPATIENT)
Dept: BARIATRICS | Facility: CLINIC | Age: 48
End: 2018-09-11

## 2018-09-11 NOTE — TELEPHONE ENCOUNTER
Called pt reviewed her chart, missing pft's , pt will call her pulmonologist to schedule. Once she does the pft's, we can sent to [facility] for prior auth.     Pt needs repeat ekg, labs + nicotine. Pt states she has quit smoking for a few months.     After we pick a sx date, will need to see pcp

## 2018-09-11 NOTE — TELEPHONE ENCOUNTER
----- Message from Kelly Ric sent at 9/11/2018 10:14 AM CDT -----  Pt called stating she need to speak with you regarding her pulmonology  appt.    Please call [phone]     Thanks

## 2018-09-11 NOTE — TELEPHONE ENCOUNTER
Returned pts call, pt said the pulmonologist 1st availability is November. Pt will call her pcp to see someone else sooner

## 2018-09-19 ENCOUNTER — TELEPHONE (OUTPATIENT)
Dept: BARIATRICS | Facility: CLINIC | Age: 48
End: 2018-09-19

## 2018-09-19 NOTE — TELEPHONE ENCOUNTER
----- Message from Kelly Larios sent at 9/19/2018  3:02 PM CDT -----  Pt called states she need to speak with you regarding her pulmonology appt.    Call [phone]

## 2018-09-24 ENCOUNTER — TELEPHONE (OUTPATIENT)
Dept: BARIATRICS | Facility: CLINIC | Age: 48
End: 2018-09-24

## 2018-09-24 DIAGNOSIS — J44.89 OBSTRUCTIVE CHRONIC BRONCHITIS WITHOUT EXACERBATION: Primary | ICD-10-CM

## 2018-09-24 NOTE — TELEPHONE ENCOUNTER
----- Message from Stanton Stout sent at 9/24/2018 11:29 AM CDT -----  Iveth Brambila would like for you to call her back regarding her clearance for surgery. Pt can be reached at [phone]

## 2018-09-27 ENCOUNTER — TELEPHONE (OUTPATIENT)
Dept: BARIATRICS | Facility: CLINIC | Age: 48
End: 2018-09-27

## 2018-09-27 ENCOUNTER — HOSPITAL ENCOUNTER (OUTPATIENT)
Dept: PULMONOLOGY | Facility: HOSPITAL | Age: 48
Discharge: HOME | End: 2018-09-27
Attending: INTERNAL MEDICINE
Payer: MEDICARE

## 2018-09-27 DIAGNOSIS — J44.89 OBSTRUCTIVE CHRONIC BRONCHITIS WITHOUT EXACERBATION: ICD-10-CM

## 2018-09-27 PROCEDURE — 94060 EVALUATION OF WHEEZING: CPT

## 2018-09-27 PROCEDURE — 94729 DIFFUSING CAPACITY: CPT

## 2018-09-27 PROCEDURE — 94727 GAS DIL/WSHOT DETER LNG VOL: CPT

## 2018-09-27 PROCEDURE — 99900031 HC PATIENT EDUCATION (STAT)

## 2018-09-27 NOTE — TELEPHONE ENCOUNTER
----- Message from Kelly Larios sent at 9/27/2018 11:02 AM CDT -----  Pt states she need to speak with you to confirm if you received paperwork she faxed over.    [phone]

## 2018-10-11 ENCOUNTER — TELEPHONE (OUTPATIENT)
Dept: BARIATRICS | Facility: CLINIC | Age: 48
End: 2018-10-11

## 2018-10-11 DIAGNOSIS — R79.89 LOW VITAMIN D LEVEL: ICD-10-CM

## 2018-10-11 DIAGNOSIS — I10 HYPERTENSION, UNSPECIFIED TYPE: ICD-10-CM

## 2018-10-11 DIAGNOSIS — M79.7 FIBROMYALGIA: ICD-10-CM

## 2018-10-11 DIAGNOSIS — E66.01 MORBID OBESITY: Primary | ICD-10-CM

## 2018-10-11 DIAGNOSIS — G47.33 OSA ON CPAP: ICD-10-CM

## 2018-10-11 DIAGNOSIS — Z79.899 OTHER LONG TERM (CURRENT) DRUG THERAPY: ICD-10-CM

## 2018-10-11 DIAGNOSIS — E61.1 LOW IRON: ICD-10-CM

## 2018-10-11 DIAGNOSIS — Z98.84 STATUS POST LAPAROSCOPIC SLEEVE GASTRECTOMY: Primary | ICD-10-CM

## 2018-10-11 DIAGNOSIS — G47.33 OSA (OBSTRUCTIVE SLEEP APNEA): ICD-10-CM

## 2018-10-11 DIAGNOSIS — E78.5 HYPERLIPIDEMIA, UNSPECIFIED HYPERLIPIDEMIA TYPE: ICD-10-CM

## 2018-10-11 DIAGNOSIS — E66.01 MORBID OBESITY: ICD-10-CM

## 2018-10-11 DIAGNOSIS — Z01.818 PREOP TESTING: ICD-10-CM

## 2018-10-11 NOTE — TELEPHONE ENCOUNTER
Spoke with patient and scheduled preop appts/ surgery date/ post op appts. All dates and times agreed upon. Pt aware that must have PCP clearance within 30 days of surgery date signed and in chart for preop clearance. Pt aware that protein liquid diet start date is 10/25/18. Pt aware all appts can be seen in my [facility] patient portal at this time and appt reminders mailed to patient's address today by RN. Given office phone and fax number for any future questions/concerns.

## 2018-10-24 ENCOUNTER — HOSPITAL ENCOUNTER (OUTPATIENT)
Dept: CARDIOLOGY | Facility: CLINIC | Age: 48
Discharge: HOME | End: 2018-10-24
Payer: MEDICARE

## 2018-10-24 ENCOUNTER — OFFICE VISIT (OUTPATIENT)
Dept: BARIATRICS | Facility: CLINIC | Age: 48
End: 2018-10-24
Payer: MEDICARE

## 2018-10-24 VITALS
DIASTOLIC BLOOD PRESSURE: 69 MMHG | HEIGHT: 68 IN | BODY MASS INDEX: 44.41 KG/M2 | WEIGHT: 293 LBS | HEART RATE: 91 BPM | SYSTOLIC BLOOD PRESSURE: 138 MMHG

## 2018-10-24 DIAGNOSIS — Z01.818 PREOP TESTING: ICD-10-CM

## 2018-10-24 DIAGNOSIS — G47.33 OSA ON CPAP: ICD-10-CM

## 2018-10-24 DIAGNOSIS — E61.1 LOW IRON: ICD-10-CM

## 2018-10-24 DIAGNOSIS — E66.01 MORBID OBESITY: Primary | ICD-10-CM

## 2018-10-24 DIAGNOSIS — Z98.84 H/O BARIATRIC SURGERY: ICD-10-CM

## 2018-10-24 DIAGNOSIS — I10 HYPERTENSION, UNSPECIFIED TYPE: ICD-10-CM

## 2018-10-24 DIAGNOSIS — E66.01 MORBID OBESITY: ICD-10-CM

## 2018-10-24 DIAGNOSIS — R79.89 LOW VITAMIN D LEVEL: ICD-10-CM

## 2018-10-24 DIAGNOSIS — I10 ESSENTIAL HYPERTENSION: ICD-10-CM

## 2018-10-24 DIAGNOSIS — E78.5 HYPERLIPIDEMIA, UNSPECIFIED HYPERLIPIDEMIA TYPE: ICD-10-CM

## 2018-10-24 PROCEDURE — 99213 OFFICE O/P EST LOW 20 MIN: CPT | Mod: S$PBB,,, | Performed by: SURGERY

## 2018-10-24 PROCEDURE — 93005 ELECTROCARDIOGRAM TRACING: CPT | Mod: PBBFAC | Performed by: INTERNAL MEDICINE

## 2018-10-24 PROCEDURE — 99213 OFFICE O/P EST LOW 20 MIN: CPT | Mod: PBBFAC,25 | Performed by: SURGERY

## 2018-10-24 PROCEDURE — 99999 PR PBB SHADOW E&M-EST. PATIENT-LVL III: CPT | Mod: PBBFAC,,, | Performed by: SURGERY

## 2018-10-24 PROCEDURE — 93010 ELECTROCARDIOGRAM REPORT: CPT | Mod: S$PBB,,, | Performed by: INTERNAL MEDICINE

## 2018-10-24 RX ORDER — POLYETHYLENE GLYCOL 3350 17 G/17G
17 POWDER, FOR SOLUTION ORAL DAILY
Qty: 527 G | Refills: 3 | Status: SHIPPED | OUTPATIENT
Start: 2018-10-24 | End: 2020-06-10

## 2018-10-24 RX ORDER — ONDANSETRON 8 MG/1
8 TABLET, ORALLY DISINTEGRATING ORAL EVERY 6 HOURS PRN
Qty: 30 TABLET | Refills: 0 | Status: SHIPPED | OUTPATIENT
Start: 2018-10-24 | End: 2020-06-10

## 2018-10-24 RX ORDER — METOCLOPRAMIDE HYDROCHLORIDE 5 MG/ML
10 INJECTION INTRAMUSCULAR; INTRAVENOUS ONCE
Status: CANCELLED | OUTPATIENT
Start: 2018-10-24 | End: 2018-10-24

## 2018-10-24 RX ORDER — OMEPRAZOLE 40 MG/1
40 CAPSULE, DELAYED RELEASE ORAL EVERY MORNING
Qty: 30 CAPSULE | Refills: 2 | Status: SHIPPED | OUTPATIENT
Start: 2018-10-24 | End: 2019-06-19 | Stop reason: SDUPTHER

## 2018-10-24 RX ORDER — ALBUTEROL SULFATE 90 UG/1
1-2 AEROSOL, METERED RESPIRATORY (INHALATION) EVERY 4 HOURS PRN
Refills: 12 | COMMUNITY
Start: 2018-09-24 | End: 2020-11-04 | Stop reason: ALTCHOICE

## 2018-10-24 RX ORDER — HYDROCODONE BITARTRATE AND ACETAMINOPHEN 7.5; 325 MG/15ML; MG/15ML
15 SOLUTION ORAL 4 TIMES DAILY PRN
Qty: 473 ML | Refills: 0 | Status: SHIPPED | OUTPATIENT
Start: 2018-10-24 | End: 2019-06-19

## 2018-10-24 RX ORDER — URSODIOL 500 MG/1
TABLET, FILM COATED ORAL
Qty: 90 TABLET | Refills: 1 | Status: SHIPPED | OUTPATIENT
Start: 2018-10-24 | End: 2019-02-12 | Stop reason: SDUPTHER

## 2018-10-24 RX ORDER — HEPARIN SODIUM 5000 [USP'U]/ML
5000 INJECTION, SOLUTION INTRAVENOUS; SUBCUTANEOUS ONCE
Status: CANCELLED | OUTPATIENT
Start: 2018-10-24 | End: 2018-10-24

## 2018-10-24 RX ORDER — SODIUM CITRATE AND CITRIC ACID MONOHYDRATE 334; 500 MG/5ML; MG/5ML
30 SOLUTION ORAL ONCE
Status: CANCELLED | OUTPATIENT
Start: 2018-10-24 | End: 2018-10-24

## 2018-10-24 RX ORDER — PROMETHAZINE HYDROCHLORIDE 6.25 MG/5ML
SYRUP ORAL
Qty: 280 ML | Refills: 0 | Status: SHIPPED | OUTPATIENT
Start: 2018-10-24 | End: 2019-05-23

## 2018-10-24 RX ORDER — FAMOTIDINE 10 MG/ML
20 INJECTION INTRAVENOUS ONCE
Status: CANCELLED | OUTPATIENT
Start: 2018-10-24 | End: 2018-10-24

## 2018-10-24 RX ORDER — FLUTICASONE FUROATE AND VILANTEROL TRIFENATATE 100; 25 UG/1; UG/1
POWDER RESPIRATORY (INHALATION)
Refills: 5 | COMMUNITY
Start: 2018-10-03 | End: 2019-05-23 | Stop reason: SDUPTHER

## 2018-10-24 RX ORDER — ZOLPIDEM TARTRATE 5 MG/1
TABLET ORAL
Refills: 5 | COMMUNITY
Start: 2018-10-17 | End: 2018-11-07

## 2018-10-24 NOTE — H&P (VIEW-ONLY)
"Subjective:  The patient is a 48 y.o. obese female who presents for pre op for conversion to gastric bypass.  She complains that she continues to gain weight after her sleeve.  She reports that she tries to follow the bariatric diet plan indicating that she drinks protein shakes twice a day, eats 1 meal, and 2 high protein snacks.  She does not exercise regularly due to fibromyalgia.  She reports that she is addicted to cheese.  She reports that her post operative course was complicated and has copies of her operative report, and some hospital documentation.    Review of hospital documents: esophageal perforation on 12/14/16 confirmed with Gastrogaffin swallow and non-contrast CT of abdomen.  Pt was transferred to [facility] (no records from [facility] were received, scanned into media).  Was in hospital for 6 weeks after sleeve: perforated esophagus and a leak.  Pneumonia x2 months also. Saw PT and respiratory therapy.  Central line left a nasty scar JOHNSON chest.      She states that she has not seen a bariatric provider since about 3-6 months after her surgery.  She reports that the lowest weight was 277 pounds around 3-6 months after surgery.  She reprots that she is gaining since then.       Today she also c/o "bad acid reflux" with all solid meals.  She describes as substernal burning.  Food sticks and comes up: 3x's/ week. Not on anything.  Will take Tums from time to time. No H2 or PPI.   All workup has been reviewed in clinic today and there is nothing on the review that would prevent us from proceeding with surgery.  All questions were answered in clinic today prior to leaving.  Body mass index is 51.47 kg/m².       Patient Active Problem List    Diagnosis Date Noted    Esophageal dysphagia 02/15/2018    Pleuritic chest pain 06/14/2017    Neuropathic pain of chest 06/14/2017    Pleural thickening 06/14/2017    MICHELINE on CPAP 04/02/2015    Hyperlipidemia 12/12/2012    Fibromyalgia syndrome 12/12/2012    " "Hypertension 2012     Past Medical History:   Diagnosis Date    SUZIE (acute kidney injury)     Anemia     Anxiety     Fibromyalgia     Hyperlipidemia     Hypertension     Inflammatory osteoarthritis     OCD (obsessive compulsive disorder)     MICHELINE on CPAP     Rectocele     Scoliosis     Sycosis     Thyroid disease       Past Surgical History:   Procedure Laterality Date    bladder lift      ESOPHAGOGASTRODUODENOSCOPY (EGD) N/A 2/15/2018    Performed by [facility]iy M[facility] at [facility] (4TH FLR)    GASTRECTOMY  2016    Dr Estevez    HYSTERECTOMY      KNEE SURGERY Right     TONSILLECTOMY      TUBAL LIGATION          (Not in a hospital admission)  Review of patient's allergies indicates:  No Known Allergies   Social History     Tobacco Use    Smoking status: Former Smoker     Packs/day: 0.50     Years: 15.00     Pack years: 7.50     Types: Cigarettes     Start date:      Last attempt to quit: 2018     Years since quittin.3    Smokeless tobacco: Never Used   Substance Use Topics    Alcohol use: No      Family History   Problem Relation Age of Onset    Hypertension Mother     Mental illness Father     COPD Father     Cancer Father     No Known Problems Sister     No Known Problems Brother         Review of Systems  Constitutional: negative for anorexia, chills and fatigue  Eyes: negative for icterus, irritation and redness  Respiratory: negative for cough and dyspnea on exertion  Cardiovascular: negative for chest pain and chest pressure/discomfort  Gastrointestinal: negative for abdominal pain, change in bowel habits, constipation and diarrhea  Musculoskeletal:negative for arthralgias and back pain  Neurological: negative for coordination problems and dizziness  Behavioral/Psych: negative for anxiety and bad mood    Objective:  Vital signs in last 24 hours:  Vitals:    10/24/18 1401   BP: 138/69   Pulse: 91   Weight: (!) 151.3 kg (333 lb 8.9 oz)   Height: 5' 7.5" (1.715 m) "       Presurgery Weight: 347 lbs  Excess Weight: 200  Weight History Current Weight Total Weight Loss EWL   2/8/2018 317 30 0.15   10/24/2018 333 14 0.07       General appearance: alert, appears stated age and cooperative  Head: Normocephalic, without obvious abnormality, atraumatic  Eyes: negative findings: lids and lashes normal and conjunctivae and sclerae normal  Neck: supple, symmetrical, trachea midline and thyroid not enlarged, symmetric, no tenderness/mass/nodules  Lungs: clear to auscultation bilaterally  Heart: regular rate and rhythm, S1, S2 normal, no murmur, click, rub or gallop  Abdomen: soft, non-tender; bowel sounds normal; no masses,  no organomegaly  Extremities: extremities normal, atraumatic, no cyanosis or edema  Skin: Skin color, texture, turgor normal. No rashes or lesions  Neurologic: Grossly normal    Data Review:  Psych: Cleared  Nutrition: Cleared  PCP: Cleared  CXR: WNL  UGI: Obtain, also obtain films from leak from 2016.  EKG: WNL  EGD: Findings:       the z line is sharp and well defined at 40 cm. there is no        esophagitis. above the z line there is deformity with a wide mouthed        diverticulum and slight narrowing of the lumen at 35 cm       A deformity was found in the gastric body. Prior gastric sleeve        surgery       The examined duodenum was normal.  Impression:           - Post-surgical deformity in the gastric body.                        - Normal examined duodenum.                        - No specimens collected  Labs: No abnormalities that would prevent proceeding with surgery.    Assessment/Plan:  Morbid obesity with failure of conservative therapy.    The patient was informed that risks include, but are not limited to: death, leak, obstruction, bleeding, and sepsis. Any of these could require further surgery. Other risks include DVT, PE, pneumonia, wound dehiscence, hernia, wound infection, the need for dilatations and the inability to lose appropriate weight  and keep it off.     We discussed that our goal is to ameliorate her medical problems and not to obtain a specific body mass index. She understands the risks and benefits and wishes to proceed with the procedure. She has signed a consent form.       Vitaliy Hoskins MD

## 2018-10-24 NOTE — PROGRESS NOTES
"Subjective:  The patient is a 48 y.o. obese female who presents for pre op for conversion to gastric bypass.  She complains that she continues to gain weight after her sleeve.  She reports that she tries to follow the bariatric diet plan indicating that she drinks protein shakes twice a day, eats 1 meal, and 2 high protein snacks.  She does not exercise regularly due to fibromyalgia.  She reports that she is addicted to cheese.  She reports that her post operative course was complicated and has copies of her operative report, and some hospital documentation.    Review of hospital documents: esophageal perforation on 12/14/16 confirmed with Gastrogaffin swallow and non-contrast CT of abdomen.  Pt was transferred to [facility] (no records from [facility] were received, scanned into media).  Was in hospital for 6 weeks after sleeve: perforated esophagus and a leak.  Pneumonia x2 months also. Saw PT and respiratory therapy.  Central line left a nasty scar JOHNSON chest.      She states that she has not seen a bariatric provider since about 3-6 months after her surgery.  She reports that the lowest weight was 277 pounds around 3-6 months after surgery.  She reprots that she is gaining since then.       Today she also c/o "bad acid reflux" with all solid meals.  She describes as substernal burning.  Food sticks and comes up: 3x's/ week. Not on anything.  Will take Tums from time to time. No H2 or PPI.   All workup has been reviewed in clinic today and there is nothing on the review that would prevent us from proceeding with surgery.  All questions were answered in clinic today prior to leaving.  Body mass index is 51.47 kg/m².       Patient Active Problem List    Diagnosis Date Noted    Esophageal dysphagia 02/15/2018    Pleuritic chest pain 06/14/2017    Neuropathic pain of chest 06/14/2017    Pleural thickening 06/14/2017    MICHELINE on CPAP 04/02/2015    Hyperlipidemia 12/12/2012    Fibromyalgia syndrome 12/12/2012    " "Hypertension 2012     Past Medical History:   Diagnosis Date    SUZIE (acute kidney injury)     Anemia     Anxiety     Fibromyalgia     Hyperlipidemia     Hypertension     Inflammatory osteoarthritis     OCD (obsessive compulsive disorder)     MICHELINE on CPAP     Rectocele     Scoliosis     Sycosis     Thyroid disease       Past Surgical History:   Procedure Laterality Date    bladder lift      ESOPHAGOGASTRODUODENOSCOPY (EGD) N/A 2/15/2018    Performed by [facility]iy [facility] at [facility] (4TH FLR)    GASTRECTOMY  2016    Dr Estevez    HYSTERECTOMY      KNEE SURGERY Right     TONSILLECTOMY      TUBAL LIGATION          (Not in a hospital admission)  Review of patient's allergies indicates:  No Known Allergies   Social History     Tobacco Use    Smoking status: Former Smoker     Packs/day: 0.50     Years: 15.00     Pack years: 7.50     Types: Cigarettes     Start date:      Last attempt to quit: 2018     Years since quittin.3    Smokeless tobacco: Never Used   Substance Use Topics    Alcohol use: No      Family History   Problem Relation Age of Onset    Hypertension Mother     Mental illness Father     COPD Father     Cancer Father     No Known Problems Sister     No Known Problems Brother         Review of Systems  Constitutional: negative for anorexia, chills and fatigue  Eyes: negative for icterus, irritation and redness  Respiratory: negative for cough and dyspnea on exertion  Cardiovascular: negative for chest pain and chest pressure/discomfort  Gastrointestinal: negative for abdominal pain, change in bowel habits, constipation and diarrhea  Musculoskeletal:negative for arthralgias and back pain  Neurological: negative for coordination problems and dizziness  Behavioral/Psych: negative for anxiety and bad mood    Objective:  Vital signs in last 24 hours:  Vitals:    10/24/18 1401   BP: 138/69   Pulse: 91   Weight: (!) 151.3 kg (333 lb 8.9 oz)   Height: 5' 7.5" (1.715 m) "       Presurgery Weight: 347 lbs  Excess Weight: 200  Weight History Current Weight Total Weight Loss EWL   2/8/2018 317 30 0.15   10/24/2018 333 14 0.07       General appearance: alert, appears stated age and cooperative  Head: Normocephalic, without obvious abnormality, atraumatic  Eyes: negative findings: lids and lashes normal and conjunctivae and sclerae normal  Neck: supple, symmetrical, trachea midline and thyroid not enlarged, symmetric, no tenderness/mass/nodules  Lungs: clear to auscultation bilaterally  Heart: regular rate and rhythm, S1, S2 normal, no murmur, click, rub or gallop  Abdomen: soft, non-tender; bowel sounds normal; no masses,  no organomegaly  Extremities: extremities normal, atraumatic, no cyanosis or edema  Skin: Skin color, texture, turgor normal. No rashes or lesions  Neurologic: Grossly normal    Data Review:  Psych: Cleared  Nutrition: Cleared  PCP: Cleared  CXR: WNL  UGI: Obtain, also obtain films from leak from 2016.  EKG: WNL  EGD: Findings:       the z line is sharp and well defined at 40 cm. there is no        esophagitis. above the z line there is deformity with a wide mouthed        diverticulum and slight narrowing of the lumen at 35 cm       A deformity was found in the gastric body. Prior gastric sleeve        surgery       The examined duodenum was normal.  Impression:           - Post-surgical deformity in the gastric body.                        - Normal examined duodenum.                        - No specimens collected  Labs: No abnormalities that would prevent proceeding with surgery.    Assessment/Plan:  Morbid obesity with failure of conservative therapy.    The patient was informed that risks include, but are not limited to: death, leak, obstruction, bleeding, and sepsis. Any of these could require further surgery. Other risks include DVT, PE, pneumonia, wound dehiscence, hernia, wound infection, the need for dilatations and the inability to lose appropriate weight  and keep it off.     We discussed that our goal is to ameliorate her medical problems and not to obtain a specific body mass index. She understands the risks and benefits and wishes to proceed with the procedure. She has signed a consent form.       Vitaliy Hoskins MD

## 2018-11-07 ENCOUNTER — TELEPHONE (OUTPATIENT)
Dept: BARIATRICS | Facility: CLINIC | Age: 48
End: 2018-11-07

## 2018-11-07 ENCOUNTER — ANESTHESIA EVENT (OUTPATIENT)
Dept: SURGERY | Facility: HOSPITAL | Age: 48
DRG: 621 | End: 2018-11-07
Payer: MEDICARE

## 2018-11-07 NOTE — TELEPHONE ENCOUNTER
Notified patient of arrival time to the [facility] at 530 with expected surgery start time 730 on 11/8/18.   Instructed patient regarding pre-op instructions including npo status, showering and preop medications to hold/take per anesthesia/preop.  Instructed patient on the s/s of dehydration and for patient to call at the first sign of dehydration.  Informed patient that someone from bariatrics will be call them 1 week post op to review diet/fluid intake and to ensure adequate hydration.   Pt verbalized understanding. Pt given office phone number for any additional questions/concerns.

## 2018-11-07 NOTE — ANESTHESIA PREPROCEDURE EVALUATION
"[facility]  Anesthesia Pre-Operative Evaluation         Patient Name: Karine Nayak  YOB: 1970  MRN: [id number]    SUBJECTIVE:     Pre-operative evaluation for Procedure(s) (LRB):  REVISION, GASTROENTEROSTOMY, NEOMI-EN-Y, LAPAROSCOPIC (N/A)     11/07/2018    Karine Nayak is a 48 y.o. female w/ a significant PMHx of MICHELINE on CPAP, fibromyalgia, HTN, obesity s/p gastric sleeve. She complains that she continues to gain weight after her sleeve. she also c/o "bad acid reflux" with all solid meals.  She describes as substernal burning.  Food sticks and comes up: 3x's/ week. Not on anything. Planned for conversion to gastric bypass.    Patient now presents for the above procedure(s).    LDA: None documented.     Prev airway: None documented.    Drips: None documented.      Patient Active Problem List   Diagnosis    Hypertension    Hyperlipidemia    Fibromyalgia syndrome    MICHELINE on CPAP    Pleuritic chest pain    Neuropathic pain of chest    Pleural thickening    Esophageal dysphagia    Morbid obesity    H/O bariatric surgery       Review of patient's allergies indicates:  No Known Allergies    Current Inpatient Medications:      No current facility-administered medications on file prior to encounter.      Current Outpatient Medications on File Prior to Encounter   Medication Sig Dispense Refill    alprazolam (XANAX) 1 MG tablet Take 1 tablet (1 mg total) by mouth every 8 (eight) hours. (Patient taking differently: Take 1 mg by mouth every 8 (eight) hours as needed for Insomnia or Anxiety. ) 90 tablet 5    aripiprazole (ABILIFY) 10 MG Tab TAKE ONE TABLET BY MOUTH ONCE A DAY (Patient taking differently: TAKE ONE TABLET BY MOUTH ONCE A DAY, morning) 30 tablet 5    calcium citrate (CALCITRATE) 200 mg (950 mg) tablet Take 1 tablet by mouth once daily.      cyanocobalamin (VITAMIN B-12) 100 MCG tablet Take 1,000 mcg by mouth once daily.       cyclobenzaprine (FLEXERIL) 10 MG tablet Take 1 " tablet by mouth 2 (two) times daily as needed.       duloxetine (CYMBALTA) 60 MG capsule TAKE ONE CAPSULE BY MOUTH ONCE A DAY (Patient taking differently: TAKE ONE CAPSULE BY MOUTH ONCE A DAY, at bedtime) 30 capsule 3    ergocalciferol (VITAMIN D2) 50,000 unit Cap Take 1 capsule (50,000 Units total) by mouth once a week. 12 capsule 0    fluticasone (FLONASE) 50 mcg/actuation nasal spray 1 spray by Each Nare route 2 (two) times daily as needed.       gabapentin (NEURONTIN) 400 MG capsule Take 1 capsule by mouth 2 (two) times daily.      hydrocodone-acetaminophen 5-325mg (NORCO) 5-325 mg per tablet Take 1 tablet by mouth every 6 (six) hours as needed.       irbesartan-hydrochlorothiazide (AVALIDE) 300-12.5 mg per tablet       levocetirizine (XYZAL) 5 MG tablet Take 1 tablet by mouth once daily.      omeprazole (PRILOSEC) 40 MG capsule Take 1 capsule (40 mg total) by mouth 2 (two) times daily before meals. (Patient taking differently: Take 40 mg by mouth 2 (two) times daily as needed. ) 60 capsule 12    spironolactone (ALDACTONE) 100 MG tablet Take 1 tablet by mouth every morning.          Past Surgical History:   Procedure Laterality Date    bladder lift      ESOPHAGOGASTRODUODENOSCOPY (EGD) N/A 2/15/2018    Performed by V[facility]iy [facility] at [facility] (4TH FLR)    GASTRECTOMY  12/2016    Dr Estevez    HYSTERECTOMY      KNEE SURGERY Right     TONSILLECTOMY      TUBAL LIGATION         Social History     Socioeconomic History    Marital status:      Spouse name: Not on file    Number of children: Not on file    Years of education: Not on file    Highest education level: Not on file   Social Needs    Financial resource strain: Not on file    Food insecurity - worry: Not on file    Food insecurity - inability: Not on file    Transportation needs - medical: Not on file    Transportation needs - non-medical: Not on file   Occupational History    Not on file   Tobacco Use    Smoking status:  Former Smoker     Packs/day: 0.50     Years: 15.00     Pack years: 7.50     Types: Cigarettes     Start date:      Last attempt to quit: 2018     Years since quittin.3    Smokeless tobacco: Never Used   Substance and Sexual Activity    Alcohol use: No    Drug use: No    Sexual activity: Yes     Partners: Male     Birth control/protection: See Surgical Hx     Comment: s/p hysterectomy   Other Topics Concern    Not on file   Social History Narrative    .  Disability due to fibromyalgia.   [place]  2 adult children (college).  1 granddaughter on the way, due May 2018.       OBJECTIVE:     Vital Signs Range (Last 24H):         Significant Labs:  Lab Results   Component Value Date    WBC 10.99 10/24/2018    HGB 10.3 (L) 10/24/2018    HCT 31.6 (L) 10/24/2018     10/24/2018    CHOL 197 2015    TRIG 92 2015    HDL 56 2015    ALT 28 10/24/2018    AST 14 10/24/2018     10/24/2018    K 3.7 10/24/2018     10/24/2018    CREATININE 1.1 10/24/2018    BUN 20 10/24/2018    CO2 26 10/24/2018    TSH 1.283 2018    HGBA1C 5.4 10/24/2018       Diagnostic Studies: No relevant studies.    EKG:   Vent. Rate : 085 BPM     Atrial Rate : 085 BPM     P-R Int : 126 ms          QRS Dur : 082 ms      QT Int : 382 ms       P-R-T Axes : 064 036 029 degrees     QTc Int : 454 ms    Normal sinus rhythm  Low voltage QRS in the precordial leads  Borderline Abnormal ECG  When compared with ECG of 2018 10:03,  No significant change was found  Confirmed by Shelly Brasher MD ([id number]) on 10/24/2018 3:24:22 PM    2D ECHO:  No results found for this or any previous visit.      ASSESSMENT/PLAN:       Anesthesia Evaluation    I have reviewed the Patient Summary Reports.    I have reviewed the Nursing Notes.   I have reviewed the Medications.     Review of Systems  Anesthesia Hx:  No problems with previous Anesthesia  History of prior surgery of interest to airway management or  planning: Previous anesthesia: General   Social:  Former Smoker, Social Alcohol Use    Hematology/Oncology:  Hematology Normal   Oncology Normal     EENT/Dental:EENT/Dental Normal   Cardiovascular:   Hypertension    Pulmonary:   Sleep Apnea, CPAP    Renal/:   Chronic Renal Disease    Musculoskeletal:   Arthritis     Neurological:   Neuromuscular Disease,    Endocrine:  Endocrine Normal    Dermatological:  Skin Normal    Psych:   Psychiatric History          Physical Exam  General:  Morbid Obesity    Airway/Jaw/Neck:  Airway Findings: Mouth Opening: Normal Tongue: Normal  General Airway Assessment: Adult  Mallampati: II  TM Distance: Normal, at least 6 cm  Jaw/Neck Findings:  Neck ROM: Normal ROM      Dental:  Dental Findings: In tact        Mental Status:  Mental Status Findings:  Cooperative, Alert and Oriented         Anesthesia Plan  Type of Anesthesia, risks & benefits discussed:  Anesthesia Type:  general  Patient's Preference: GA  Intra-op Monitoring Plan: standard ASA monitors  Intra-op Monitoring Plan Comments:   Post Op Pain Control Plan: multimodal analgesia, IV/PO Opioids PRN and per primary service following discharge from PACU  Post Op Pain Control Plan Comments:   Induction:   IV  Beta Blocker:  Patient is not currently on a Beta-Blocker (No further documentation required).       Informed Consent: Patient understands risks and agrees with Anesthesia plan.  Questions answered. Anesthesia consent signed with patient.  ASA Score: 3     Day of Surgery Review of History & Physical:            Ready For Surgery From Anesthesia Perspective.

## 2018-11-07 NOTE — PRE-PROCEDURE INSTRUCTIONS
"Spoke with Patient.  NPO, medication, and pre-op instructions reviewed.  Stated that her esophagus was "perforated" during her original Gastric Sleeve.  Had one protein shake today and is now on a clear liquid diet.  Uses C-PAP and will bring with her.  Vitamins are on Hold. Reviewed the pain scale.  Verbalized understanding of instructions.    "

## 2018-11-08 ENCOUNTER — HOSPITAL ENCOUNTER (INPATIENT)
Facility: HOSPITAL | Age: 48
LOS: 1 days | Discharge: HOME | DRG: 621 | End: 2018-11-09
Attending: SURGERY | Admitting: SURGERY
Payer: MEDICARE

## 2018-11-08 ENCOUNTER — ANESTHESIA (OUTPATIENT)
Dept: SURGERY | Facility: HOSPITAL | Age: 48
DRG: 621 | End: 2018-11-08
Payer: MEDICARE

## 2018-11-08 DIAGNOSIS — G47.33 OSA ON CPAP: ICD-10-CM

## 2018-11-08 DIAGNOSIS — E66.01 MORBID OBESITY: Primary | ICD-10-CM

## 2018-11-08 LAB — POCT GLUCOSE: 98 MG/DL (ref 70–110)

## 2018-11-08 PROCEDURE — 63600175 PHARM REV CODE 636 W HCPCS

## 2018-11-08 PROCEDURE — 36000711: Performed by: SURGERY

## 2018-11-08 PROCEDURE — 63600175 PHARM REV CODE 636 W HCPCS: Performed by: SURGERY

## 2018-11-08 PROCEDURE — 25000003 PHARM REV CODE 250: Performed by: STUDENT IN AN ORGANIZED HEALTH CARE EDUCATION/TRAINING PROGRAM

## 2018-11-08 PROCEDURE — 82962 GLUCOSE BLOOD TEST: CPT | Performed by: SURGERY

## 2018-11-08 PROCEDURE — S0028 INJECTION, FAMOTIDINE, 20 MG: HCPCS | Performed by: SURGERY

## 2018-11-08 PROCEDURE — 71000039 HC RECOVERY, EACH ADD'L HOUR: Performed by: SURGERY

## 2018-11-08 PROCEDURE — 94761 N-INVAS EAR/PLS OXIMETRY MLT: CPT

## 2018-11-08 PROCEDURE — 27800903 OPTIME MED/SURG SUP & DEVICES OTHER IMPLANTS: Performed by: SURGERY

## 2018-11-08 PROCEDURE — 63600175 PHARM REV CODE 636 W HCPCS: Performed by: STUDENT IN AN ORGANIZED HEALTH CARE EDUCATION/TRAINING PROGRAM

## 2018-11-08 PROCEDURE — 0DJ08ZZ INSPECTION OF UPPER INTESTINAL TRACT, VIA NATURAL OR ARTIFICIAL OPENING ENDOSCOPIC: ICD-10-PCS | Performed by: SURGERY

## 2018-11-08 PROCEDURE — S0030 INJECTION, METRONIDAZOLE: HCPCS | Performed by: STUDENT IN AN ORGANIZED HEALTH CARE EDUCATION/TRAINING PROGRAM

## 2018-11-08 PROCEDURE — 43644 LAP GASTRIC BYPASS/ROUX-EN-Y: CPT | Mod: GC,,, | Performed by: SURGERY

## 2018-11-08 PROCEDURE — 27000221 HC OXYGEN, UP TO 24 HOURS

## 2018-11-08 PROCEDURE — 12000002 HC ACUTE/MED SURGE SEMI-PRIVATE ROOM

## 2018-11-08 PROCEDURE — 25000003 PHARM REV CODE 250: Performed by: SURGERY

## 2018-11-08 PROCEDURE — 27201423 OPTIME MED/SURG SUP & DEVICES STERILE SUPPLY: Performed by: SURGERY

## 2018-11-08 PROCEDURE — C9113 INJ PANTOPRAZOLE SODIUM, VIA: HCPCS | Performed by: STUDENT IN AN ORGANIZED HEALTH CARE EDUCATION/TRAINING PROGRAM

## 2018-11-08 PROCEDURE — 0D164ZA BYPASS STOMACH TO JEJUNUM, PERCUTANEOUS ENDOSCOPIC APPROACH: ICD-10-PCS | Performed by: SURGERY

## 2018-11-08 PROCEDURE — 37000009 HC ANESTHESIA EA ADD 15 MINS: Performed by: SURGERY

## 2018-11-08 PROCEDURE — D9220A PRA ANESTHESIA: Mod: ,,, | Performed by: ANESTHESIOLOGY

## 2018-11-08 PROCEDURE — 36000710: Performed by: SURGERY

## 2018-11-08 PROCEDURE — 71000033 HC RECOVERY, INTIAL HOUR: Performed by: SURGERY

## 2018-11-08 PROCEDURE — 37000008 HC ANESTHESIA 1ST 15 MINUTES: Performed by: SURGERY

## 2018-11-08 DEVICE — SEAMGUARD ESCHELON 60 MM.: Type: IMPLANTABLE DEVICE | Site: ABDOMEN | Status: FUNCTIONAL

## 2018-11-08 RX ORDER — LIDOCAINE HCL/PF 100 MG/5ML
SYRINGE (ML) INTRAVENOUS
Status: DISCONTINUED | OUTPATIENT
Start: 2018-11-08 | End: 2018-11-08

## 2018-11-08 RX ORDER — LIDOCAINE HYDROCHLORIDE 10 MG/ML
INJECTION, SOLUTION EPIDURAL; INFILTRATION; INTRACAUDAL; PERINEURAL
Status: DISCONTINUED | OUTPATIENT
Start: 2018-11-08 | End: 2018-11-08 | Stop reason: HOSPADM

## 2018-11-08 RX ORDER — FLUTICASONE PROPIONATE 50 MCG
1 SPRAY, SUSPENSION (ML) NASAL 2 TIMES DAILY PRN
Status: DISCONTINUED | OUTPATIENT
Start: 2018-11-08 | End: 2018-11-09 | Stop reason: HOSPADM

## 2018-11-08 RX ORDER — SODIUM CHLORIDE 0.9 % (FLUSH) 0.9 %
3 SYRINGE (ML) INJECTION
Status: DISCONTINUED | OUTPATIENT
Start: 2018-11-08 | End: 2018-11-08 | Stop reason: HOSPADM

## 2018-11-08 RX ORDER — SODIUM CITRATE AND CITRIC ACID MONOHYDRATE 334; 500 MG/5ML; MG/5ML
30 SOLUTION ORAL ONCE
Status: COMPLETED | OUTPATIENT
Start: 2018-11-08 | End: 2018-11-08

## 2018-11-08 RX ORDER — METRONIDAZOLE 500 MG/100ML
INJECTION, SOLUTION INTRAVENOUS
Status: DISCONTINUED | OUTPATIENT
Start: 2018-11-08 | End: 2018-11-08

## 2018-11-08 RX ORDER — METOCLOPRAMIDE HYDROCHLORIDE 5 MG/ML
10 INJECTION INTRAMUSCULAR; INTRAVENOUS ONCE
Status: COMPLETED | OUTPATIENT
Start: 2018-11-08 | End: 2018-11-08

## 2018-11-08 RX ORDER — FAMOTIDINE 10 MG/ML
20 INJECTION INTRAVENOUS ONCE
Status: COMPLETED | OUTPATIENT
Start: 2018-11-08 | End: 2018-11-08

## 2018-11-08 RX ORDER — GLYCOPYRROLATE 0.2 MG/ML
INJECTION INTRAMUSCULAR; INTRAVENOUS
Status: DISCONTINUED | OUTPATIENT
Start: 2018-11-08 | End: 2018-11-08

## 2018-11-08 RX ORDER — ONDANSETRON 2 MG/ML
INJECTION INTRAMUSCULAR; INTRAVENOUS
Status: DISCONTINUED | OUTPATIENT
Start: 2018-11-08 | End: 2018-11-08

## 2018-11-08 RX ORDER — KETAMINE HYDROCHLORIDE 10 MG/ML
INJECTION, SOLUTION INTRAMUSCULAR; INTRAVENOUS
Status: DISCONTINUED | OUTPATIENT
Start: 2018-11-08 | End: 2018-11-08

## 2018-11-08 RX ORDER — ENOXAPARIN SODIUM 100 MG/ML
40 INJECTION SUBCUTANEOUS EVERY 12 HOURS
Status: DISCONTINUED | OUTPATIENT
Start: 2018-11-08 | End: 2018-11-09 | Stop reason: HOSPADM

## 2018-11-08 RX ORDER — FENTANYL CITRATE 50 UG/ML
INJECTION, SOLUTION INTRAMUSCULAR; INTRAVENOUS
Status: DISCONTINUED | OUTPATIENT
Start: 2018-11-08 | End: 2018-11-08

## 2018-11-08 RX ORDER — MIDAZOLAM HYDROCHLORIDE 1 MG/ML
INJECTION, SOLUTION INTRAMUSCULAR; INTRAVENOUS
Status: DISCONTINUED | OUTPATIENT
Start: 2018-11-08 | End: 2018-11-08

## 2018-11-08 RX ORDER — NALOXONE HCL 0.4 MG/ML
0.02 VIAL (ML) INJECTION
Status: DISCONTINUED | OUTPATIENT
Start: 2018-11-08 | End: 2018-11-09

## 2018-11-08 RX ORDER — ONDANSETRON 2 MG/ML
4 INJECTION INTRAMUSCULAR; INTRAVENOUS ONCE AS NEEDED
Status: DISCONTINUED | OUTPATIENT
Start: 2018-11-08 | End: 2018-11-08 | Stop reason: HOSPADM

## 2018-11-08 RX ORDER — ONDANSETRON 2 MG/ML
4 INJECTION INTRAMUSCULAR; INTRAVENOUS EVERY 6 HOURS PRN
Status: DISCONTINUED | OUTPATIENT
Start: 2018-11-08 | End: 2018-11-09 | Stop reason: HOSPADM

## 2018-11-08 RX ORDER — PANTOPRAZOLE SODIUM 40 MG/10ML
INJECTION, POWDER, LYOPHILIZED, FOR SOLUTION INTRAVENOUS
Status: DISPENSED
Start: 2018-11-08 | End: 2018-11-08

## 2018-11-08 RX ORDER — ARIPIPRAZOLE 10 MG/1
10 TABLET ORAL DAILY
Status: DISCONTINUED | OUTPATIENT
Start: 2018-11-09 | End: 2018-11-09 | Stop reason: HOSPADM

## 2018-11-08 RX ORDER — FLUTICASONE FUROATE AND VILANTEROL 100; 25 UG/1; UG/1
1 POWDER RESPIRATORY (INHALATION) DAILY
Status: DISCONTINUED | OUTPATIENT
Start: 2018-11-09 | End: 2018-11-09 | Stop reason: HOSPADM

## 2018-11-08 RX ORDER — IPRATROPIUM BROMIDE AND ALBUTEROL SULFATE 2.5; .5 MG/3ML; MG/3ML
3 SOLUTION RESPIRATORY (INHALATION) EVERY 4 HOURS PRN
Status: DISCONTINUED | OUTPATIENT
Start: 2018-11-08 | End: 2018-11-08 | Stop reason: HOSPADM

## 2018-11-08 RX ORDER — ACETAMINOPHEN 10 MG/ML
INJECTION, SOLUTION INTRAVENOUS
Status: DISCONTINUED | OUTPATIENT
Start: 2018-11-08 | End: 2018-11-08

## 2018-11-08 RX ORDER — PANTOPRAZOLE SODIUM 40 MG/10ML
40 INJECTION, POWDER, LYOPHILIZED, FOR SOLUTION INTRAVENOUS 2 TIMES DAILY
Status: DISCONTINUED | OUTPATIENT
Start: 2018-11-08 | End: 2018-11-09 | Stop reason: HOSPADM

## 2018-11-08 RX ORDER — HEPARIN SODIUM 5000 [USP'U]/ML
5000 INJECTION, SOLUTION INTRAVENOUS; SUBCUTANEOUS ONCE
Status: COMPLETED | OUTPATIENT
Start: 2018-11-08 | End: 2018-11-08

## 2018-11-08 RX ORDER — ACETAMINOPHEN 10 MG/ML
1000 INJECTION, SOLUTION INTRAVENOUS EVERY 8 HOURS
Status: COMPLETED | OUTPATIENT
Start: 2018-11-08 | End: 2018-11-09

## 2018-11-08 RX ORDER — DEXAMETHASONE SODIUM PHOSPHATE 4 MG/ML
INJECTION, SOLUTION INTRA-ARTICULAR; INTRALESIONAL; INTRAMUSCULAR; INTRAVENOUS; SOFT TISSUE
Status: DISCONTINUED | OUTPATIENT
Start: 2018-11-08 | End: 2018-11-08

## 2018-11-08 RX ORDER — ROCURONIUM BROMIDE 10 MG/ML
INJECTION, SOLUTION INTRAVENOUS
Status: DISCONTINUED | OUTPATIENT
Start: 2018-11-08 | End: 2018-11-08

## 2018-11-08 RX ORDER — SODIUM CHLORIDE 9 MG/ML
INJECTION, SOLUTION INTRAVENOUS CONTINUOUS
Status: DISCONTINUED | OUTPATIENT
Start: 2018-11-08 | End: 2018-11-09

## 2018-11-08 RX ORDER — NEOSTIGMINE METHYLSULFATE 1 MG/ML
INJECTION, SOLUTION INTRAVENOUS
Status: DISCONTINUED | OUTPATIENT
Start: 2018-11-08 | End: 2018-11-08

## 2018-11-08 RX ORDER — BUPIVACAINE HYDROCHLORIDE AND EPINEPHRINE 5; 5 MG/ML; UG/ML
INJECTION, SOLUTION EPIDURAL; INTRACAUDAL; PERINEURAL
Status: DISCONTINUED | OUTPATIENT
Start: 2018-11-08 | End: 2018-11-08 | Stop reason: HOSPADM

## 2018-11-08 RX ORDER — SODIUM CHLORIDE 9 MG/ML
INJECTION, SOLUTION INTRAVENOUS CONTINUOUS PRN
Status: DISCONTINUED | OUTPATIENT
Start: 2018-11-08 | End: 2018-11-08

## 2018-11-08 RX ORDER — HYDROMORPHONE HCL IN 0.9% NACL 6 MG/30 ML
PATIENT CONTROLLED ANALGESIA SYRINGE INTRAVENOUS CONTINUOUS
Status: DISCONTINUED | OUTPATIENT
Start: 2018-11-08 | End: 2018-11-09

## 2018-11-08 RX ORDER — SODIUM CHLORIDE 0.9 % (FLUSH) 0.9 %
3 SYRINGE (ML) INJECTION
Status: DISCONTINUED | OUTPATIENT
Start: 2018-11-08 | End: 2018-11-08

## 2018-11-08 RX ORDER — HYDROCODONE BITARTRATE AND ACETAMINOPHEN 7.5; 325 MG/15ML; MG/15ML
15 SOLUTION ORAL EVERY 4 HOURS PRN
Status: DISCONTINUED | OUTPATIENT
Start: 2018-11-09 | End: 2018-11-09 | Stop reason: HOSPADM

## 2018-11-08 RX ORDER — HYDROMORPHONE HYDROCHLORIDE 1 MG/ML
INJECTION, SOLUTION INTRAMUSCULAR; INTRAVENOUS; SUBCUTANEOUS
Status: COMPLETED
Start: 2018-11-08 | End: 2018-11-08

## 2018-11-08 RX ORDER — PROPOFOL 10 MG/ML
VIAL (ML) INTRAVENOUS
Status: DISCONTINUED | OUTPATIENT
Start: 2018-11-08 | End: 2018-11-08

## 2018-11-08 RX ORDER — SUCCINYLCHOLINE CHLORIDE 20 MG/ML
INJECTION INTRAMUSCULAR; INTRAVENOUS
Status: DISCONTINUED | OUTPATIENT
Start: 2018-11-08 | End: 2018-11-08

## 2018-11-08 RX ORDER — ALBUTEROL SULFATE 90 UG/1
1 AEROSOL, METERED RESPIRATORY (INHALATION) EVERY 4 HOURS PRN
Status: DISCONTINUED | OUTPATIENT
Start: 2018-11-08 | End: 2018-11-09 | Stop reason: HOSPADM

## 2018-11-08 RX ORDER — PHENYLEPHRINE HYDROCHLORIDE 10 MG/ML
INJECTION INTRAVENOUS
Status: DISCONTINUED | OUTPATIENT
Start: 2018-11-08 | End: 2018-11-08

## 2018-11-08 RX ORDER — HYDROMORPHONE HYDROCHLORIDE 1 MG/ML
0.2 INJECTION, SOLUTION INTRAMUSCULAR; INTRAVENOUS; SUBCUTANEOUS EVERY 5 MIN PRN
Status: DISCONTINUED | OUTPATIENT
Start: 2018-11-08 | End: 2018-11-08 | Stop reason: HOSPADM

## 2018-11-08 RX ORDER — HYDROMORPHONE HCL IN 0.9% NACL 6 MG/30 ML
PATIENT CONTROLLED ANALGESIA SYRINGE INTRAVENOUS
Status: COMPLETED
Start: 2018-11-08 | End: 2018-11-08

## 2018-11-08 RX ORDER — OXYCODONE AND ACETAMINOPHEN 5; 325 MG/1; MG/1
1 TABLET ORAL
Status: DISCONTINUED | OUTPATIENT
Start: 2018-11-08 | End: 2018-11-08

## 2018-11-08 RX ORDER — SPIRONOLACTONE 50 MG/1
100 TABLET, FILM COATED ORAL EVERY MORNING
Status: DISCONTINUED | OUTPATIENT
Start: 2018-11-09 | End: 2018-11-09 | Stop reason: HOSPADM

## 2018-11-08 RX ADMIN — PANTOPRAZOLE SODIUM 40 MG: 40 INJECTION, POWDER, FOR SOLUTION INTRAVENOUS at 10:11

## 2018-11-08 RX ADMIN — ENOXAPARIN SODIUM 40 MG: 100 INJECTION SUBCUTANEOUS at 07:11

## 2018-11-08 RX ADMIN — FAMOTIDINE 20 MG: 10 INJECTION, SOLUTION INTRAVENOUS at 06:11

## 2018-11-08 RX ADMIN — GLYCOPYRROLATE 0.6 MG: 0.2 INJECTION, SOLUTION INTRAMUSCULAR; INTRAVENOUS at 10:11

## 2018-11-08 RX ADMIN — LIDOCAINE HYDROCHLORIDE 100 MG: 20 INJECTION, SOLUTION INTRAVENOUS at 08:11

## 2018-11-08 RX ADMIN — Medication 0.2 MG: at 11:11

## 2018-11-08 RX ADMIN — NEOSTIGMINE METHYLSULFATE 5 MG: 1 INJECTION INTRAVENOUS at 10:11

## 2018-11-08 RX ADMIN — PROPOFOL 200 MG: 10 INJECTION, EMULSION INTRAVENOUS at 08:11

## 2018-11-08 RX ADMIN — KETAMINE HYDROCHLORIDE 25 MG: 10 INJECTION, SOLUTION INTRAMUSCULAR; INTRAVENOUS at 08:11

## 2018-11-08 RX ADMIN — Medication: at 04:11

## 2018-11-08 RX ADMIN — ROCURONIUM BROMIDE 20 MG: 10 INJECTION, SOLUTION INTRAVENOUS at 08:11

## 2018-11-08 RX ADMIN — CEFAZOLIN SODIUM 3 ML: 2 SOLUTION INTRAVENOUS at 08:11

## 2018-11-08 RX ADMIN — PANTOPRAZOLE SODIUM 40 MG: 40 INJECTION, POWDER, FOR SOLUTION INTRAVENOUS at 08:11

## 2018-11-08 RX ADMIN — ACETAMINOPHEN 1000 MG: 10 INJECTION, SOLUTION INTRAVENOUS at 07:11

## 2018-11-08 RX ADMIN — ROCURONIUM BROMIDE 10 MG: 10 INJECTION, SOLUTION INTRAVENOUS at 08:11

## 2018-11-08 RX ADMIN — DEXAMETHASONE SODIUM PHOSPHATE 8 MG: 4 INJECTION, SOLUTION INTRAMUSCULAR; INTRAVENOUS at 08:11

## 2018-11-08 RX ADMIN — MIDAZOLAM HYDROCHLORIDE 2 MG: 1 INJECTION, SOLUTION INTRAMUSCULAR; INTRAVENOUS at 07:11

## 2018-11-08 RX ADMIN — FENTANYL CITRATE 50 MCG: 50 INJECTION, SOLUTION INTRAMUSCULAR; INTRAVENOUS at 08:11

## 2018-11-08 RX ADMIN — METRONIDAZOLE 500 MG: 500 SOLUTION INTRAVENOUS at 08:11

## 2018-11-08 RX ADMIN — SODIUM CHLORIDE: 9 INJECTION, SOLUTION INTRAVENOUS at 07:11

## 2018-11-08 RX ADMIN — Medication: at 10:11

## 2018-11-08 RX ADMIN — ACETAMINOPHEN 1000 MG: 10 INJECTION, SOLUTION INTRAVENOUS at 08:11

## 2018-11-08 RX ADMIN — SODIUM CHLORIDE, SODIUM GLUCONATE, SODIUM ACETATE, POTASSIUM CHLORIDE, MAGNESIUM CHLORIDE, SODIUM PHOSPHATE, DIBASIC, AND POTASSIUM PHOSPHATE: .53; .5; .37; .037; .03; .012; .00082 INJECTION, SOLUTION INTRAVENOUS at 08:11

## 2018-11-08 RX ADMIN — HEPARIN SODIUM 5000 UNITS: 5000 INJECTION, SOLUTION INTRAVENOUS; SUBCUTANEOUS at 06:11

## 2018-11-08 RX ADMIN — PHENYLEPHRINE HYDROCHLORIDE 200 MCG: 10 INJECTION INTRAVENOUS at 09:11

## 2018-11-08 RX ADMIN — METOCLOPRAMIDE 10 MG: 5 INJECTION, SOLUTION INTRAMUSCULAR; INTRAVENOUS at 06:11

## 2018-11-08 RX ADMIN — SUCCINYLCHOLINE CHLORIDE 140 MG: 20 INJECTION, SOLUTION INTRAMUSCULAR; INTRAVENOUS at 08:11

## 2018-11-08 RX ADMIN — KETAMINE HYDROCHLORIDE 25 MG: 10 INJECTION, SOLUTION INTRAMUSCULAR; INTRAVENOUS at 09:11

## 2018-11-08 RX ADMIN — SODIUM CITRATE AND CITRIC ACID MONOHYDRATE 30 ML: 500; 334 SOLUTION ORAL at 06:11

## 2018-11-08 RX ADMIN — SODIUM CHLORIDE: 0.9 INJECTION, SOLUTION INTRAVENOUS at 10:11

## 2018-11-08 RX ADMIN — ONDANSETRON 4 MG: 2 INJECTION INTRAMUSCULAR; INTRAVENOUS at 10:11

## 2018-11-08 RX ADMIN — ROCURONIUM BROMIDE 10 MG: 10 INJECTION, SOLUTION INTRAVENOUS at 09:11

## 2018-11-08 NOTE — ANESTHESIA POSTPROCEDURE EVALUATION
"Anesthesia Post Evaluation    Patient: Karine Nayak    Procedure(s) Performed: Procedure(s) (LRB):  REVISION, GASTROENTEROSTOMY, NOEMI-EN-Y, LAPAROSCOPIC (N/A)    Final Anesthesia Type: general  Patient location during evaluation: PACU  Patient participation: Yes- Able to Participate  Level of consciousness: awake and alert  Post-procedure vital signs: reviewed and stable  Pain management: adequate  Airway patency: patent  PONV status at discharge: No PONV  Anesthetic complications: no      Cardiovascular status: blood pressure returned to baseline  Respiratory status: unassisted  Hydration status: euvolemic  Follow-up not needed.        Visit Vitals  /68   Pulse 75   Temp 36.6 °C (97.8 °F) (Other (see comments))   Resp 18   Ht 5' 8" (1.727 m)   Wt (!) 146.8 kg (323 lb 10.2 oz)   LMP 01/30/2013   SpO2 97%   Breastfeeding? No   BMI 49.21 kg/m²       Pain/Rachna Score: Pain Assessment Performed: Yes (11/8/2018  1:02 PM)  Presence of Pain: complains of pain/discomfort (11/8/2018  1:02 PM)  Pain Rating Prior to Med Admin: 7 (11/8/2018 11:12 AM)  Rachna Score: 9 (11/8/2018  1:02 PM)        "

## 2018-11-08 NOTE — INTERVAL H&P NOTE
The patient has been examined and the H&P has been reviewed:    I concur with the findings and no changes have occurred since H&P was written.    Anesthesia/Surgery risks, benefits and alternative options discussed and understood by patient/family.          Active Hospital Problems    Diagnosis  POA    MICHELINE on CPAP [G47.33, Z99.89]  Not Applicable      Resolved Hospital Problems   No resolved problems to display.

## 2018-11-08 NOTE — HPI
"The patient is a 48 y.o. obese female who presents for pre op for conversion to gastric bypass.  She complains that she continues to gain weight after her sleeve.  She reports that she tries to follow the bariatric diet plan indicating that she drinks protein shakes twice a day, eats 1 meal, and 2 high protein snacks.  She does not exercise regularly due to fibromyalgia.  She reports that she is addicted to cheese.  She reports that her post operative course was complicated and has copies of her operative report, and some hospital documentation.    Review of hospital documents: esophageal perforation on 12/14/16 confirmed with Gastrogaffin swallow and non-contrast CT of abdomen.  Pt was transferred to [facility] (no records from [facility] were received, scanned into media).  Was in hospital for 6 weeks after sleeve: perforated esophagus and a leak.  Pneumonia x2 months also. Saw PT and respiratory therapy.  Central line left a nasty scar JOHNSON chest.      She states that she has not seen a bariatric provider since about 3-6 months after her surgery.  She reports that the lowest weight was 277 pounds around 3-6 months after surgery.  She reprots that she is gaining since then.       Today she also c/o "bad acid reflux" with all solid meals.  She describes as substernal burning.  Food sticks and comes up: 3x's/ week. Not on anything.  Will take Tums from time to time. No H2 or PPI.   All workup has been reviewed in clinic today and there is nothing on the review that would prevent us from proceeding with surgery.  All questions were answered in clinic today prior to leaving.  Body mass index is 51.47 kg/m².   "

## 2018-11-08 NOTE — OP NOTE
DATE OF PROCEDURE: 11/08/2018  SERVICE: Bariatric Surgery.   Surgeon(s) and Role:     * Vitaliy Hoskins Jr., MD - Primary     * Rosa Alarcon MD - Resident - Assisting  PREOPERATIVE DIAGNOSES: Morbid obesity with a Body mass index is 49.21 kg/m².   along with benign hypertension, obstructive sleep apnea and hyperlipidemia .    POSTOPERATIVE DIAGNOSES: Same  PROCEDURE: Laparoscopic conversion from sleeve gastrectomy to Dennis-en-Y gastric bypass and EGD.   ANESTHESIA: General endotracheal and local.   DESCRIPTION OF PROCEDURE: The patient was taken to the Operating Room, placed under general anesthesia and prepped and draped in a sterile fashion. At this   time, an incision was made approximately 15 cm from the xiphoid and 5 cm to the   left of midline after infiltrating with local anesthetic. Using Optiview view   trocar, intra-abdominal access was obtained under direct visualization. At this   time, once the port was placed, pneumoperitoneum was obtained and further ports   were placed including bilateral anterior axillary subcostal 5 mm ports and   midclavicular subcostal port on the right and a second 12 port approximately 15   cm from the xiphoid just to right of the midline. All these ports were placed   under direct visualization after infiltrating with local anesthetic. Once the   ports were placed, we noticed slight adhesions.  These were taken down.  We then began with the jejunojejunostomy. We elevated the omentum   and transected this with a Harmonic scalpel to the transverse colon. The   transverse colon was then elevated. We found the ligament of Treitz and then   ran the small bowel 45 cm and transected the bowel at this point with a white   load stapler with SeamGuard. We then took several centimeters of mesentery with   the Harmonic scalpel to allow extra length on our Dennis limb. Once we had   completed this, we then ran the distal portion of the small bowel 150 cm and   tacked this to the  proximal small bowel with a 2-0 Surgidac suture on an   EndoStitch device. This was elevated and a small enterotomy was made on either   side of the small bowel. A white load stapler was placed in either limb of the   small bowel and fired creating a jejunojejunostomy. The jejunojejunostomy was   then elevated and the common enterotomy was then closed with a 2-0 Surgidac   suture on an EndoStitch device in 2-layer running fashion. Once this was   complete, we also closed the mesenteric defect, again with a 2-0 Surgidac on an   EndoStitch device in running fashion. This then completed, the jejunojejunostomy was inspected and appeared to be in very good condition. We turned our attention then to   formation of the gastric pouch. A liver retractor was placed. The patient was   placed in steep reverse Trendelenburg. An area on the lesser curve at the   second bridging vein was identified and using the Harmonic scalpel, the   gastrohepatic ligament was opened and the lesser sac was exposed. We then   placed a blue load stapler transversely across the stomach after ensuring there   was nothing in the stomach from anesthesia perspective. We fired the stapler   transversely across the sleeve, this completed formation of the pouch.  Once the pouch was complete, we made a   small hole with the Harmonic scalpel in the transverse portion of the gastric   pouch and an anvil was guided by Anesthesia into place. This was done by   placing the OG portion into the pouch. It was then brought out through the   small hole that was made and then this was used to pull the anvil into the pouch   and the OG portion was cut and removed and passed off the table. The Dennis limb   was then brought up and it was opened on the end with the Harmonic scalpel to   allow for the circular stapler device. At this time, the incision approximately   15 cm from the xiphoid just to right of midline was enlarged to accommodate the   25 mm circular stapler  with 3.5 mm staple height. We opened this skin at the   skin level and then from the fascial level we opened the fascia with the   Harmonic scalpel and 0-Vicryl suture was placed in figure-of-eight fashion;   however, it was not tied down at this time and a wound retractor was placed into   this incision as well. The 25 mm circular stapler was placed into this   incision and then placed into the Dennis limb. The spike was brought out through   the side of the small bowel and attached to the anvil and the gastric pouch.   This was closed ensuring there was no tissue in between here and it was fired   creating the gastrojejunostomy. At this time, a 2-0 Polysorb suture was placed   in U-fashion on either side of the anastomosis for tension relief. The end of   the Dennis limb, which had been opened for placement of the circular stapler, was   then removed. This was done by taking the mesentery with Harmonic scalpel and   firing across the small bowel at the level of the gastric pouch with a white   load stapler with SeamGuard. The end of the small bowel that was removed was   then passed off the table. Everything was inspected and appeared to be in very   good condition. We then placed a bowel clamp over the Dennis limb and turned our   attention to an EGD. The scope was placed down the oropharynx and into the   esophagus followed down the esophagus and into the gastric pouch. At this time,   fluid was used to submerge the gastrojejunostomy and air was insufflated into   the gastric pouch. The Dennis limb dilated appropriately. There was air heard   coming back from the mouth and there were no signs of bubbles or any other   abnormalities. At this time, air was suctioned from the Dennis limb and the pouch   and the scope was removed under direct visualization. We turned our attention   back to laparoscopic view. At this time, all the fluid was suctioned from the   abdominal cavity. The liver retractor was removed. The ports were  removed   under direct visualization. The suture, which had been placed in the fascia of   the incision 15 cm from the xiphoid just to right of midline, was tied down   closing the fascia of this incision and this incision was then irrigated   copiously. The skin of all 5 port sites were then closed with 4-0 Monocryl   suture in subcuticular fashion. Mastisol and Steri-Strips were placed and the   patient was allowed to awake from general anesthesia and transferred to bed for   transport to Recovery.   COMPLICATIONS: None.   SPONGE COUNT: Correct.   BLOOD LOSS: 15 mL.   FLUIDS: Per Anesthesia.   BLOOD GIVEN: None.   DRAINS: None.   SPECIMENS: None.   CONDITION OF PATIENT: Good.   I was present for the entire procedure.

## 2018-11-08 NOTE — BRIEF OP NOTE
[facility]  Brief Operative Note    SUMMARY     Surgery Date: 11/8/2018     Surgeon(s) and Role:     * Vitaliy Hoskins Jr., MD - Primary     * Rosa Alarcon MD - Resident - Assisting    Pre-op Diagnosis:  Morbid obesity [E66.01]  MICHELINE (obstructive sleep apnea) [G47.33]  Hyperlipidemia, unspecified hyperlipidemia type [E78.5]  Hypertension, unspecified type [I10]    Post-op Diagnosis:  Post-Op Diagnosis Codes:     * Morbid obesity [E66.01]     * MICHELINE (obstructive sleep apnea) [G47.33]     * Hyperlipidemia, unspecified hyperlipidemia type [E78.5]     * Hypertension, unspecified type [I10]    Procedure(s) (LRB):  REVISION, GASTROENTEROSTOMY, NOEMI-EN-Y, LAPAROSCOPIC (N/A)    Anesthesia: General    Description of Procedure:   1. Laparoscopic conversion of sleeve gastrectomy to Noemi-en-Y bypass  2. EGD    Description of the findings of the procedure: see op note; leak test negative     Estimated Blood Loss: minimal         Specimens:   Specimen (12h ago, onward)    None

## 2018-11-08 NOTE — TRANSFER OF CARE
"Anesthesia Transfer of Care Note    Patient: Karine Nayak    Procedure(s) Performed: Procedure(s) (LRB):  REVISION, GASTROENTEROSTOMY, NOEMI-EN-Y, LAPAROSCOPIC (N/A)    Patient location: PACU    Anesthesia Type: general    Transport from OR: Transported from OR on 6-10 L/min O2 by face mask with adequate spontaneous ventilation    Post pain: adequate analgesia    Post assessment: no apparent anesthetic complications and tolerated procedure well    Post vital signs: stable    Level of consciousness: awake and alert    Nausea/Vomiting: no nausea/vomiting    Complications: none    Transfer of care protocol was followed      Last vitals:   Visit Vitals  BP (!) 125/57   Pulse 86   Temp 36.2 °C (97.2 °F) (Temporal)   Resp 16   Ht 5' 8" (1.727 m)   Wt (!) 146.8 kg (323 lb 10.2 oz)   LMP 01/30/2013   SpO2 97%   Breastfeeding? No   BMI 49.21 kg/m²     "

## 2018-11-08 NOTE — NURSING TRANSFER
Nursing Transfer Note      11/8/2018     Transfer To: 553 A    Transfer via stretcher    Transfer with cardiac monitoring, ETCO2    Transported by PCT    Medicines sent: PCA, NS    Chart send with patient: Yes    Notified: spouse    Patient reassessed at: 1300 11/8/2018    Upon arrival to floor: cardiac monitor applied, patient oriented to room, call bell in reach and bed in lowest position

## 2018-11-09 VITALS
DIASTOLIC BLOOD PRESSURE: 56 MMHG | TEMPERATURE: 98 F | BODY MASS INDEX: 44.41 KG/M2 | RESPIRATION RATE: 20 BRPM | HEART RATE: 97 BPM | WEIGHT: 293 LBS | SYSTOLIC BLOOD PRESSURE: 112 MMHG | OXYGEN SATURATION: 99 % | HEIGHT: 68 IN

## 2018-11-09 LAB
ANION GAP SERPL CALC-SCNC: 10 MMOL/L
BASOPHILS # BLD AUTO: 0.02 K/UL
BASOPHILS NFR BLD: 0.2 %
BUN SERPL-MCNC: 19 MG/DL
CALCIUM SERPL-MCNC: 8.9 MG/DL
CHLORIDE SERPL-SCNC: 109 MMOL/L
CO2 SERPL-SCNC: 21 MMOL/L
CREAT SERPL-MCNC: 1.1 MG/DL
DIFFERENTIAL METHOD: ABNORMAL
EOSINOPHIL # BLD AUTO: 0 K/UL
EOSINOPHIL NFR BLD: 0 %
ERYTHROCYTE [DISTWIDTH] IN BLOOD BY AUTOMATED COUNT: 14.3 %
EST. GFR  (AFRICAN AMERICAN): >60 ML/MIN/1.73 M^2
EST. GFR  (NON AFRICAN AMERICAN): 59.9 ML/MIN/1.73 M^2
GLUCOSE SERPL-MCNC: 84 MG/DL
HCT VFR BLD AUTO: 29.9 %
HGB BLD-MCNC: 9.9 G/DL
IMM GRANULOCYTES # BLD AUTO: 0.04 K/UL
IMM GRANULOCYTES NFR BLD AUTO: 0.3 %
LYMPHOCYTES # BLD AUTO: 1.5 K/UL
LYMPHOCYTES NFR BLD: 12.1 %
MAGNESIUM SERPL-MCNC: 2.1 MG/DL
MCH RBC QN AUTO: 28 PG
MCHC RBC AUTO-ENTMCNC: 33.1 G/DL
MCV RBC AUTO: 85 FL
MONOCYTES # BLD AUTO: 0.7 K/UL
MONOCYTES NFR BLD: 5.9 %
NEUTROPHILS # BLD AUTO: 10 K/UL
NEUTROPHILS NFR BLD: 81.5 %
NRBC BLD-RTO: 0 /100 WBC
PHOSPHATE SERPL-MCNC: 2 MG/DL
PLATELET # BLD AUTO: 309 K/UL
PMV BLD AUTO: 10 FL
POTASSIUM SERPL-SCNC: 4.6 MMOL/L
RBC # BLD AUTO: 3.54 M/UL
SODIUM SERPL-SCNC: 140 MMOL/L
WBC # BLD AUTO: 12.28 K/UL

## 2018-11-09 PROCEDURE — 83735 ASSAY OF MAGNESIUM: CPT

## 2018-11-09 PROCEDURE — 36415 COLL VENOUS BLD VENIPUNCTURE: CPT

## 2018-11-09 PROCEDURE — 25000003 PHARM REV CODE 250: Performed by: STUDENT IN AN ORGANIZED HEALTH CARE EDUCATION/TRAINING PROGRAM

## 2018-11-09 PROCEDURE — 25000242 PHARM REV CODE 250 ALT 637 W/ HCPCS: Performed by: STUDENT IN AN ORGANIZED HEALTH CARE EDUCATION/TRAINING PROGRAM

## 2018-11-09 PROCEDURE — 84100 ASSAY OF PHOSPHORUS: CPT

## 2018-11-09 PROCEDURE — 85025 COMPLETE CBC W/AUTO DIFF WBC: CPT

## 2018-11-09 PROCEDURE — 63600175 PHARM REV CODE 636 W HCPCS: Performed by: STUDENT IN AN ORGANIZED HEALTH CARE EDUCATION/TRAINING PROGRAM

## 2018-11-09 PROCEDURE — 80048 BASIC METABOLIC PNL TOTAL CA: CPT

## 2018-11-09 PROCEDURE — C9113 INJ PANTOPRAZOLE SODIUM, VIA: HCPCS | Performed by: STUDENT IN AN ORGANIZED HEALTH CARE EDUCATION/TRAINING PROGRAM

## 2018-11-09 RX ORDER — DIPHENHYDRAMINE HYDROCHLORIDE 50 MG/ML
6.25 INJECTION INTRAMUSCULAR; INTRAVENOUS EVERY 6 HOURS PRN
Status: DISCONTINUED | OUTPATIENT
Start: 2018-11-09 | End: 2018-11-09

## 2018-11-09 RX ADMIN — ACETAMINOPHEN 1000 MG: 10 INJECTION, SOLUTION INTRAVENOUS at 12:11

## 2018-11-09 RX ADMIN — HYDROCODONE BITARTRATE AND ACETAMINOPHEN 15 ML: 7.5; 325 SOLUTION ORAL at 04:11

## 2018-11-09 RX ADMIN — ENOXAPARIN SODIUM 40 MG: 100 INJECTION SUBCUTANEOUS at 08:11

## 2018-11-09 RX ADMIN — ACETAMINOPHEN 1000 MG: 10 INJECTION, SOLUTION INTRAVENOUS at 08:11

## 2018-11-09 RX ADMIN — FLUTICASONE FUROATE AND VILANTEROL TRIFENATATE 1 PUFF: 100; 25 POWDER RESPIRATORY (INHALATION) at 11:11

## 2018-11-09 RX ADMIN — Medication: at 02:11

## 2018-11-09 RX ADMIN — HYDROCODONE BITARTRATE AND ACETAMINOPHEN 15 ML: 7.5; 325 SOLUTION ORAL at 11:11

## 2018-11-09 RX ADMIN — ARIPIPRAZOLE 10 MG: 10 TABLET ORAL at 08:11

## 2018-11-09 RX ADMIN — PANTOPRAZOLE SODIUM 40 MG: 40 INJECTION, POWDER, FOR SOLUTION INTRAVENOUS at 08:11

## 2018-11-09 RX ADMIN — SPIRONOLACTONE 100 MG: 50 TABLET ORAL at 06:11

## 2018-11-09 NOTE — PLAN OF CARE
11/09/18 1533   Final Note   Assessment Type Final Discharge Note   What phone number can be called within the next 1-3 days to see how you are doing after discharge? ([phone])   Hospital Follow Up  Appt(s) scheduled? Yes   Discharge plans and expectations educations in teach back method with documentation complete? Yes   Right Care Referral Info   Post Acute Recommendation (Incomplete)

## 2018-11-09 NOTE — NURSING
Pt being discharged home per md orders. Discharge orders were read and copy given to pt. Pt verbalized understanding.

## 2018-11-09 NOTE — ASSESSMENT & PLAN NOTE
Ms. Karine Nayak is a 47 yo female who underwent a Laparoscopic conversion from sleeve gastrectomy to Dennis-en-Y gastric bypass and EGD on 11/8/18. She tolerated the procedure well without complication.    Plan:   -Continue CLD.  -D/c PCA and start hycet  -If patient tolerates CLD can d/c IVF  -continue zofran and phenergan for nausea control  -OOB, ambulate, IS  -If patient continues to tolerate CLD without nausea/vomiting can look for possible d/c later this afternoon

## 2018-11-09 NOTE — PLAN OF CARE
Patient lives in a 2 story home with spouse and adult children, spouse currently present at BS, pt independent and agile, no needs determined at this time

## 2018-11-09 NOTE — PROGRESS NOTES
"[facility]  General Surgery  Progress Note    Subjective:     History of Present Illness:  The patient is a 48 y.o. obese female who presents for pre op for conversion to gastric bypass.  She complains that she continues to gain weight after her sleeve.  She reports that she tries to follow the bariatric diet plan indicating that she drinks protein shakes twice a day, eats 1 meal, and 2 high protein snacks.  She does not exercise regularly due to fibromyalgia.  She reports that she is addicted to cheese.  She reports that her post operative course was complicated and has copies of her operative report, and some hospital documentation.    Review of hospital documents: esophageal perforation on 12/14/16 confirmed with Gastrogaffin swallow and non-contrast CT of abdomen.  Pt was transferred to [facility] (no records from [facility] were received, scanned into media).  Was in hospital for 6 weeks after sleeve: perforated esophagus and a leak.  Pneumonia x2 months also. Saw PT and respiratory therapy.  Central line left a nasty scar JOHNSON chest.      She states that she has not seen a bariatric provider since about 3-6 months after her surgery.  She reports that the lowest weight was 277 pounds around 3-6 months after surgery.  She reprots that she is gaining since then.       Today she also c/o "bad acid reflux" with all solid meals.  She describes as substernal burning.  Food sticks and comes up: 3x's/ week. Not on anything.  Will take Tums from time to time. No H2 or PPI.   All workup has been reviewed in clinic today and there is nothing on the review that would prevent us from proceeding with surgery.  All questions were answered in clinic today prior to leaving.  Body mass index is 51.47 kg/m².      Post-Op Info:  Procedure(s) (LRB):  REVISION, GASTROENTEROSTOMY, NOEMI-EN-Y, LAPAROSCOPIC (N/A)   1 Day Post-Op     Interval History:     -No acute events overnight. VSS. Afebrile.  -Noting adequate " pain control overnight.  -No nausea/vomiting.  -Was able to ambulate on the floor after surgery.  -Was tolerating the water protocol thus far.    Medications:  Continuous Infusions:   sodium chloride 0.9% 125 mL/hr at 11/08/18 1041     Scheduled Meds:   acetaminophen  1,000 mg Intravenous Q8H    ARIPiprazole  10 mg Oral Daily    enoxparin  40 mg Subcutaneous Q12H    fluticasone-vilanterol  1 puff Inhalation Daily    pantoprazole  40 mg Intravenous BID    spironolactone  100 mg Oral QAM     PRN Meds:albuterol, fluticasone, hydrocodone-apap 7.5-325 MG/15 ML, ondansetron, promethazine (PHENERGAN) IVPB     Review of patient's allergies indicates:  No Known Allergies  Objective:     Vital Signs (Most Recent):  Temp: 98.1 °F (36.7 °C) (11/09/18 0402)  Pulse: (!) 111 (11/09/18 0611)  Resp: 18 (11/09/18 0402)  BP: 132/64 (11/09/18 0611)  SpO2: (!) 93 % (11/09/18 0402) Vital Signs (24h Range):  Temp:  [95 °F (35 °C)-98.1 °F (36.7 °C)] 98.1 °F (36.7 °C)  Pulse:  [] 111  Resp:  [16-20] 18  SpO2:  [93 %-99 %] 93 %  BP: (102-153)/(53-73) 132/64     Weight: (!) 146.8 kg (323 lb 10.2 oz)  Body mass index is 49.21 kg/m².    Intake/Output - Last 3 Shifts       11/07 0700 - 11/08 0659 11/08 0700 - 11/09 0659 11/09 0700 - 11/10 0659    I.V. (mL/kg)  1200 (8.2)     Total Intake(mL/kg)  1200 (8.2)     Urine (mL/kg/hr)  600 (0.2)     Total Output  600     Net  +600                  Physical Exam   Constitutional: She is oriented to person, place, and time. She appears well-developed and well-nourished. No distress.   HENT:   Head: Normocephalic and atraumatic.   Eyes: Conjunctivae are normal. Pupils are equal, round, and reactive to light.   Neck: Normal range of motion. Neck supple.   Cardiovascular: Normal rate and regular rhythm.   Pulmonary/Chest: Effort normal. No respiratory distress.   Abdominal: Soft. She exhibits no distension. There is tenderness (appropriately ttp.).   Incisional dressings c/d/i.    Musculoskeletal: Normal range of motion. She exhibits no edema.   Neurological: She is alert and oriented to person, place, and time.   Skin: Skin is warm and dry.   Psychiatric: She has a normal mood and affect. Her behavior is normal. Judgment normal.       Significant Labs:  CBC: No results for input(s): WBC, RBC, HGB, HCT, PLT, MCV, MCH, MCHC in the last 168 hours.  CMP: No results for input(s): GLU, CALCIUM, ALBUMIN, PROT, NA, K, CO2, CL, BUN, CREATININE, ALKPHOS, ALT, AST, BILITOT in the last 168 hours.    Significant Diagnostics:  I have reviewed all pertinent imaging results/findings within the past 24 hours.    Assessment/Plan:     * Morbid obesity    Ms. Karine Nayak is a 47 yo female who underwent a Laparoscopic conversion from sleeve gastrectomy to Dennis-en-Y gastric bypass and EGD on 11/8/18. She tolerated the procedure well without complication.    Plan:   -Continue CLD.  -D/c PCA and start hycet  -If patient tolerates CLD can d/c IVF  -continue zofran and phenergan for nausea control  -OOB, ambulate, IS  -If patient continues to tolerate CLD without nausea/vomiting can look for possible d/c later this afternoon             Ophelia Briones MD  General Surgery  [facility]

## 2018-11-09 NOTE — CONSULTS
Consult received re: post-bypass education. Please consult outpatient bariatric RD to see pt. Inpatient RDs do not educate on post-bypass diet. Left message with outpatient RD. Will monitor.

## 2018-11-09 NOTE — PLAN OF CARE
Problem: Fall Risk (Adult)  Goal: Absence of Falls  Patient will demonstrate the desired outcomes by discharge/transition of care.  Outcome: Ongoing (interventions implemented as appropriate)  Pt ambulated per shift , gait steady. Pt free from falls.

## 2018-11-09 NOTE — PLAN OF CARE
Addendum:     11/09/18 1150   Discharge Assessment   Assessment Type Discharge Planning Assessment   Confirmed/corrected address and phone number on facesheet? Yes   Assessment information obtained from? Medical Record;Patient   Expected Length of Stay (days) 1   Communicated expected length of stay with patient/caregiver no  (per MD )   Prior to hospitilization cognitive status: Alert/Oriented;No Deficits   Prior to hospitalization functional status: Independent   Current cognitive status: Alert/Oriented;No Deficits   Current Functional Status: Independent   Facility Arrived From: N/A   Lives With spouse;child(padmini), adult   Able to Return to Prior Arrangements yes   Is patient able to care for self after discharge? Yes   Who are your caregiver(s) and their phone number(s)? Eric Nayak  [phone]   Patient's perception of discharge disposition home or selfcare   Readmission Within The Last 30 Days no previous admission in last 30 days   Patient currently receives any other outside agency services? No   Equipment Currently Used at Home CPAP   Do you have any problems affording any of your prescribed medications? No   Is the patient taking medications as prescribed? yes   Does the patient have transportation home? Yes   Transportation Available car;family or friend will provide   Dialysis Name and Scheduled days N/A   Does the patient receive services at the Coumadin Clinic? No   Discharge Plan A Home with family   Patient/Family In Agreement With Plan yes

## 2018-11-09 NOTE — SUBJECTIVE & OBJECTIVE
Interval History:     -No acute events overnight. VSS. Afebrile.  -Noting adequate pain control overnight.  -No nausea/vomiting.  -Was able to ambulate on the floor after surgery.  -Was tolerating the water protocol thus far.    Medications:  Continuous Infusions:   sodium chloride 0.9% 125 mL/hr at 11/08/18 1041     Scheduled Meds:   acetaminophen  1,000 mg Intravenous Q8H    ARIPiprazole  10 mg Oral Daily    enoxparin  40 mg Subcutaneous Q12H    fluticasone-vilanterol  1 puff Inhalation Daily    pantoprazole  40 mg Intravenous BID    spironolactone  100 mg Oral QAM     PRN Meds:albuterol, fluticasone, hydrocodone-apap 7.5-325 MG/15 ML, ondansetron, promethazine (PHENERGAN) IVPB     Review of patient's allergies indicates:  No Known Allergies  Objective:     Vital Signs (Most Recent):  Temp: 98.1 °F (36.7 °C) (11/09/18 0402)  Pulse: (!) 111 (11/09/18 0611)  Resp: 18 (11/09/18 0402)  BP: 132/64 (11/09/18 0611)  SpO2: (!) 93 % (11/09/18 0402) Vital Signs (24h Range):  Temp:  [95 °F (35 °C)-98.1 °F (36.7 °C)] 98.1 °F (36.7 °C)  Pulse:  [] 111  Resp:  [16-20] 18  SpO2:  [93 %-99 %] 93 %  BP: (102-153)/(53-73) 132/64     Weight: (!) 146.8 kg (323 lb 10.2 oz)  Body mass index is 49.21 kg/m².    Intake/Output - Last 3 Shifts       11/07 0700 - 11/08 0659 11/08 0700 - 11/09 0659 11/09 0700 - 11/10 0659    I.V. (mL/kg)  1200 (8.2)     Total Intake(mL/kg)  1200 (8.2)     Urine (mL/kg/hr)  600 (0.2)     Total Output  600     Net  +600                  Physical Exam   Constitutional: She is oriented to person, place, and time. She appears well-developed and well-nourished. No distress.   HENT:   Head: Normocephalic and atraumatic.   Eyes: Conjunctivae are normal. Pupils are equal, round, and reactive to light.   Neck: Normal range of motion. Neck supple.   Cardiovascular: Normal rate and regular rhythm.   Pulmonary/Chest: Effort normal. No respiratory distress.   Abdominal: Soft. She exhibits no distension. There  is tenderness (appropriately ttp.).   Incisional dressings c/d/i.   Musculoskeletal: Normal range of motion. She exhibits no edema.   Neurological: She is alert and oriented to person, place, and time.   Skin: Skin is warm and dry.   Psychiatric: She has a normal mood and affect. Her behavior is normal. Judgment normal.       Significant Labs:  CBC: No results for input(s): WBC, RBC, HGB, HCT, PLT, MCV, MCH, MCHC in the last 168 hours.  CMP: No results for input(s): GLU, CALCIUM, ALBUMIN, PROT, NA, K, CO2, CL, BUN, CREATININE, ALKPHOS, ALT, AST, BILITOT in the last 168 hours.    Significant Diagnostics:  I have reviewed all pertinent imaging results/findings within the past 24 hours.

## 2018-11-09 NOTE — DISCHARGE SUMMARY
"[facility]  General Surgery  Discharge Summary      Patient Name: Karine Nayak  MRN: [id number]  Admission Date: 11/8/2018  Hospital Length of Stay: 1 days  Discharge Date and Time:  11/09/2018 2:45 PM  Attending Physician: Vitaliy Hoskins Jr.,*   Discharging Provider: Ophelia Briones MD  Primary Care Provider: Claritza Kingsley NP    HPI:   The patient is a 48 y.o. obese female who presents for pre op for conversion to gastric bypass.  She complains that she continues to gain weight after her sleeve.  She reports that she tries to follow the bariatric diet plan indicating that she drinks protein shakes twice a day, eats 1 meal, and 2 high protein snacks.  She does not exercise regularly due to fibromyalgia.  She reports that she is addicted to cheese.  She reports that her post operative course was complicated and has copies of her operative report, and some hospital documentation.    Review of hospital documents: esophageal perforation on 12/14/16 confirmed with Gastrogaffin swallow and non-contrast CT of abdomen.  Pt was transferred to [facility] (no records from [facility] were received, scanned into media).  Was in hospital for 6 weeks after sleeve: perforated esophagus and a leak.  Pneumonia x2 months also. Saw PT and respiratory therapy.  Central line left a nasty scar JOHNSON chest.      She states that she has not seen a bariatric provider since about 3-6 months after her surgery.  She reports that the lowest weight was 277 pounds around 3-6 months after surgery.  She reprots that she is gaining since then.       Today she also c/o "bad acid reflux" with all solid meals.  She describes as substernal burning.  Food sticks and comes up: 3x's/ week. Not on anything.  Will take Tums from time to time. No H2 or PPI.   All workup has been reviewed in clinic today and there is nothing on the review that would prevent us from proceeding with surgery.  All questions were answered in clinic today " prior to leaving.  Body mass index is 51.47 kg/m².      Procedure(s) (LRB):  REVISION, GASTROENTEROSTOMY, NOEMI-EN-Y, LAPAROSCOPIC (N/A)      Indwelling Lines/Drains at time of discharge:   Lines/Drains/Airways          None        Hospital Course: Following surgery patient went to PACU and subsequently to the floor for recovery. She was noted to immediately tolerate ambulation well, and the morning following surgery was advanced to the water protocol which she tolerated without nausea or vomiting. She was noted to later tolerate a clear liquid diet without nausea or vomiting. She had good control of pain, first with the PCA pump and later with hycet. As she had good pain control and was able to tolerate a diet without nausea or vomiting she became eligible for discharge.    Consults:   Consults (From admission, onward)        Status Ordering Provider     Inpatient consult to Midline team  Once     Provider:  (Not yet assigned)    DIANA Joseph JR     Inpatient consult to Registered Dietitian/Nutritionist  Once     Provider:  (Not yet assigned)    Completed KYM DONALD          Pending Diagnostic Studies:     None        Final Active Diagnoses:    Diagnosis Date Noted POA    PRINCIPAL PROBLEM:  Morbid obesity [E66.01] 10/24/2018 Yes    MICHELINE on CPAP [G47.33, Z99.89] 04/02/2015 Not Applicable      Problems Resolved During this Admission:      Discharged Condition: good    Disposition: Home or Self Care    Follow Up:  Follow-up Information     Diana Hoskins Jr, MD On 11/26/2018.    Specialties:  General Surgery, Bariatrics  Why:  Post-op Appt: 11/26/18 at 10:00 AM  Contact information:  [address] [place] [id number]  [phone]                 Patient Instructions:   -Please CRUSH all oral medications/open up all capsules before taking for the next two weeks after surgery.       Diet clear liquid   Scheduling Instructions: Please follow clear liquid diet as instructed in  your pre-operative appointment.     Lifting restrictions   Scheduling Instructions: Please do not lift anything greater than 10 lbs for the following 6 weeks after surgery.     Other restrictions (specify):   Scheduling Instructions: Ok to shower 48 hours after surgery. Allow warm, soapy water to wash over incisions. Then rinse off and pat dry. Do not scrub. Do not submerge incisions in standing water (hot tub, bath tub, swimming pool) for at least two weeks after surgery.     No driving until:   Scheduling Instructions: Please do not drive while utilizing narcotic medications for pain control.     Notify your health care provider if you experience any of the following:  temperature >100.4     Notify your health care provider if you experience any of the following:  persistent nausea and vomiting or diarrhea     Notify your health care provider if you experience any of the following:  severe uncontrolled pain     Notify your health care provider if you experience any of the following:  redness, tenderness, or signs of infection (pain, swelling, redness, odor or green/yellow discharge around incision site)     Notify your health care provider if you experience any of the following:  difficulty breathing or increased cough     Change dressing (specify)   Order Comments: Please keep steri-strips on incisions until they come off on their own over time-this usually takes about 7-10 days.     Activity as tolerated     Medications:  Reconciled Home Medications:      Medication List      CHANGE how you take these medications    ALPRAZolam 1 MG tablet  Commonly known as:  XANAX  Take 1 tablet (1 mg total) by mouth every 8 (eight) hours.  What changed:    · when to take this  · reasons to take this     ARIPiprazole 10 MG Tab  Commonly known as:  ABILIFY  TAKE ONE TABLET BY MOUTH ONCE A DAY  What changed:    · how much to take  · how to take this  · when to take this     DULoxetine 60 MG capsule  Commonly known as:   CYMBALTA  TAKE ONE CAPSULE BY MOUTH ONCE A DAY  What changed:    · how much to take  · how to take this  · when to take this     hydrocodone-apap 7.5-325 MG/15 ML oral solution  Commonly known as:  HYCET  Take 15 mLs by mouth 4 (four) times daily as needed for Pain.  What changed:  Another medication with the same name was removed. Continue taking this medication, and follow the directions you see here.     omeprazole 40 MG capsule  Commonly known as:  PRILOSEC  Take 1 capsule (40 mg total) by mouth every morning. Open capsule and take with apple sauce  What changed:  Another medication with the same name was removed. Continue taking this medication, and follow the directions you see here.        CONTINUE taking these medications    BREO ELLIPTA 100-25 mcg/dose diskus inhaler  Generic drug:  fluticasone-vilanterol  INHALE ONE PUFF INTO THE LUNGS ONCE A DAY, morning     calcium citrate 200 mg (950 mg) tablet  Commonly known as:  CALCITRATE  Take 1 tablet by mouth every morning.     cyclobenzaprine 10 MG tablet  Commonly known as:  FLEXERIL  Take 1 tablet by mouth 2 (two) times daily as needed.     ergocalciferol 50,000 unit Cap  Commonly known as:  VITAMIN D2  Take 1 capsule (50,000 Units total) by mouth once a week.     fluticasone 50 mcg/actuation nasal spray  Commonly known as:  FLONASE  1 spray by Each Nare route 2 (two) times daily as needed.     gabapentin 400 MG capsule  Commonly known as:  NEURONTIN  Take 1 capsule by mouth 2 (two) times daily.     irbesartan-hydrochlorothiazide 300-12.5 mg per tablet  Commonly known as:  AVALIDE  Take 1 tablet by mouth every morning.     levocetirizine 5 MG tablet  Commonly known as:  XYZAL  Take 1 tablet by mouth daily as needed.     ondansetron 8 MG Tbdl  Commonly known as:  ZOFRAN-ODT  Take 1 tablet (8 mg total) by mouth every 6 (six) hours as needed.     polyethylene glycol 17 gram/dose powder  Commonly known as:  GLYCOLAX  Take one capful (17 g) by mouth once daily.      promethazine 6.25 mg/5 mL syrup  Commonly known as:  PHENERGAN  take 1 to 2 teaspoonsful by mouth every 6 hours for 7 days     spironolactone 100 MG tablet  Commonly known as:  ALDACTONE  Take 1 tablet by mouth every morning.     ursodiol 500 MG tablet  Commonly known as:  ACTIGALL  Crush one tablet and mix with water and drink once daily for gall bladder     VENTOLIN HFA 90 mcg/actuation inhaler  Generic drug:  albuterol  INHALE ONE TO TWO PUFFS INTO THE LUNGS EVERY 4 HOURS FOR SHORTNESS OF BREATH, prn     VITAMIN B-12 100 MCG tablet  Generic drug:  cyanocobalamin  Take 1,000 mcg by mouth every morning.          Time spent on the discharge of patient: 30 minutes      Ophelia Briones MD  General Surgery  [facility]

## 2018-11-09 NOTE — PLAN OF CARE
Problem: Patient Care Overview  Goal: Plan of Care Review  Outcome: Ongoing (interventions implemented as appropriate)  Patient AAOx4. complaints of abd pain. VS stable, afrebrile. Neurovascular intact. Skin intact, with exception of lap sites x6. Free from falls. Frequent rounds made for safety, pain and comfort. Bed at lowest position, call light within reach, side rails up x2.

## 2018-11-09 NOTE — HOSPITAL COURSE
Following surgery patient went to PACU and subsequently to the floor for recovery. She was noted to immediately tolerate ambulation well, and the morning following surgery was advanced to the water protocol which she tolerated without nausea or vomiting. She was noted to later tolerate a clear liquid diet without nausea or vomiting. She had good control of pain, first with the PCA pump and later with hycet. As she had good pain control and was able to tolerate a diet without nausea or vomiting she became eligible for discharge.

## 2018-11-09 NOTE — NURSING
Pt awake, alert resp even and unlabored. Skin warm and dry to touch. Speech clear verbalized no c/o pain or discomfort. Safety measures in place.

## 2018-11-26 ENCOUNTER — LAB VISIT (OUTPATIENT)
Dept: LAB | Facility: HOSPITAL | Age: 48
End: 2018-11-26
Attending: SURGERY
Payer: MEDICARE

## 2018-11-26 ENCOUNTER — OFFICE VISIT (OUTPATIENT)
Dept: BARIATRICS | Facility: CLINIC | Age: 48
End: 2018-11-26
Payer: MEDICARE

## 2018-11-26 ENCOUNTER — CLINICAL SUPPORT (OUTPATIENT)
Dept: BARIATRICS | Facility: CLINIC | Age: 48
End: 2018-11-26
Payer: MEDICARE

## 2018-11-26 VITALS
WEIGHT: 293 LBS | HEART RATE: 79 BPM | HEIGHT: 68 IN | SYSTOLIC BLOOD PRESSURE: 132 MMHG | DIASTOLIC BLOOD PRESSURE: 73 MMHG | BODY MASS INDEX: 44.41 KG/M2

## 2018-11-26 DIAGNOSIS — Z98.890 POSTOPERATIVE STATE: Primary | ICD-10-CM

## 2018-11-26 DIAGNOSIS — E78.5 HYPERLIPIDEMIA, UNSPECIFIED HYPERLIPIDEMIA TYPE: ICD-10-CM

## 2018-11-26 DIAGNOSIS — M79.7 FIBROMYALGIA: ICD-10-CM

## 2018-11-26 DIAGNOSIS — R79.89 LOW VITAMIN D LEVEL: ICD-10-CM

## 2018-11-26 DIAGNOSIS — E61.1 LOW IRON: ICD-10-CM

## 2018-11-26 DIAGNOSIS — I10 HYPERTENSION, UNSPECIFIED TYPE: ICD-10-CM

## 2018-11-26 DIAGNOSIS — Z98.84 STATUS POST LAPAROSCOPIC SLEEVE GASTRECTOMY: ICD-10-CM

## 2018-11-26 DIAGNOSIS — Z98.84 HISTORY OF ROUX-EN-Y GASTRIC BYPASS: ICD-10-CM

## 2018-11-26 DIAGNOSIS — Z79.899 OTHER LONG TERM (CURRENT) DRUG THERAPY: ICD-10-CM

## 2018-11-26 DIAGNOSIS — R63.4 WEIGHT LOSS: ICD-10-CM

## 2018-11-26 DIAGNOSIS — G47.33 OSA ON CPAP: ICD-10-CM

## 2018-11-26 DIAGNOSIS — E66.01 MORBID OBESITY: ICD-10-CM

## 2018-11-26 PROBLEM — J44.9 COPD (CHRONIC OBSTRUCTIVE PULMONARY DISEASE): Status: ACTIVE | Noted: 2018-11-26

## 2018-11-26 LAB
ALBUMIN SERPL BCP-MCNC: 3.6 G/DL
ALP SERPL-CCNC: 53 U/L
ALT SERPL W/O P-5'-P-CCNC: 8 U/L
ANION GAP SERPL CALC-SCNC: 8 MMOL/L
AST SERPL-CCNC: 10 U/L
BASOPHILS # BLD AUTO: 0.04 K/UL
BASOPHILS NFR BLD: 0.5 %
BILIRUB SERPL-MCNC: 0.5 MG/DL
BUN SERPL-MCNC: 18 MG/DL
CALCIUM SERPL-MCNC: 10.1 MG/DL
CHLORIDE SERPL-SCNC: 105 MMOL/L
CO2 SERPL-SCNC: 26 MMOL/L
CREAT SERPL-MCNC: 0.9 MG/DL
DIFFERENTIAL METHOD: ABNORMAL
EOSINOPHIL # BLD AUTO: 0.2 K/UL
EOSINOPHIL NFR BLD: 2 %
ERYTHROCYTE [DISTWIDTH] IN BLOOD BY AUTOMATED COUNT: 14.1 %
EST. GFR  (AFRICAN AMERICAN): >60 ML/MIN/1.73 M^2
EST. GFR  (NON AFRICAN AMERICAN): >60 ML/MIN/1.73 M^2
GLUCOSE SERPL-MCNC: 101 MG/DL
HCT VFR BLD AUTO: 33.4 %
HGB BLD-MCNC: 11 G/DL
IMM GRANULOCYTES # BLD AUTO: 0.05 K/UL
IMM GRANULOCYTES NFR BLD AUTO: 0.6 %
LYMPHOCYTES # BLD AUTO: 2.3 K/UL
LYMPHOCYTES NFR BLD: 25.6 %
MCH RBC QN AUTO: 27.5 PG
MCHC RBC AUTO-ENTMCNC: 32.9 G/DL
MCV RBC AUTO: 84 FL
MONOCYTES # BLD AUTO: 0.5 K/UL
MONOCYTES NFR BLD: 5.3 %
NEUTROPHILS # BLD AUTO: 5.8 K/UL
NEUTROPHILS NFR BLD: 66 %
NRBC BLD-RTO: 0 /100 WBC
PLATELET # BLD AUTO: 392 K/UL
PMV BLD AUTO: 10.1 FL
POTASSIUM SERPL-SCNC: 4.2 MMOL/L
PROT SERPL-MCNC: 7.7 G/DL
RBC # BLD AUTO: 4 M/UL
SODIUM SERPL-SCNC: 139 MMOL/L
VIT B12 SERPL-MCNC: 593 PG/ML
WBC # BLD AUTO: 8.82 K/UL

## 2018-11-26 PROCEDURE — 84425 ASSAY OF VITAMIN B-1: CPT

## 2018-11-26 PROCEDURE — 99999 PR PBB SHADOW E&M-EST. PATIENT-LVL V: CPT | Mod: PBBFAC,,, | Performed by: NURSE PRACTITIONER

## 2018-11-26 PROCEDURE — 82607 VITAMIN B-12: CPT

## 2018-11-26 PROCEDURE — 99499 UNLISTED E&M SERVICE: CPT | Mod: S$PBB,,, | Performed by: DIETITIAN, REGISTERED

## 2018-11-26 PROCEDURE — 99215 OFFICE O/P EST HI 40 MIN: CPT | Mod: PBBFAC | Performed by: NURSE PRACTITIONER

## 2018-11-26 PROCEDURE — 80053 COMPREHEN METABOLIC PANEL: CPT

## 2018-11-26 PROCEDURE — 99024 POSTOP FOLLOW-UP VISIT: CPT | Mod: POP,,, | Performed by: NURSE PRACTITIONER

## 2018-11-26 PROCEDURE — 85025 COMPLETE CBC W/AUTO DIFF WBC: CPT

## 2018-11-26 NOTE — PATIENT INSTRUCTIONS
High Protein Pureed Diet    2 weeks after gastric bypass and sleeve you may be ready to add pureed food to your diet.  All food should be the consistency of baby food, or thinner.  Follow pureed diet for the next 2 weeks.    Protein - It is very important to pay attention to protein intake during this time.      Inadequate protein intake can cause:  ? Delayed Wound Healing  ? Hair Loss  ? Muscle Breakdown    Meal Plan - Eat 3-4 meals per day (2-4 tbsp each), with protein supplements in between to meet protein needs.  Meeting protein needs daily will help increase healing, decrease muscle loss, and increase weight loss.  Your goal is  grams of protein a day.    Protein First - Always eat the foods with the highest protein first.  Foods high in protein include milk, yogurt, cheese, egg whites, and blenderized meat, seafood, and beans.    Fluids - Keep track in your journal of how much you are drinking; you should try to drink at least 64oz of fluids every day.      Foods allowed: Portion size Protein (g)   ? Sugar-free clear liquids As desired 0   ? Skim or 1% milk ½ cup 4   ? Sugar free pudding, light yogurt, custard (use skim or 1% milk in preparation) 3 oz 2.5   ? Strained baby food meats, or home-made pureed lean meats and shrimp 1 oz 7   ? Beans (red, white, black, lima, gutierrez, fat free refried, hummus) and lentils ¼ cup 4   ? Low-fat/fat free cheese.(cottage cheese, mozzarella string cheese, ricotta cheese, Laughing Cow, Baby Bell, cheddar, etc) ¼ cup 7-8   ? Scrambled eggs or Egg Beaters 1 or ¼ cup 6   ? Edamame or Tofu, mashed ¼ cup 5   ? Unflavored protein powder (add to 1 scoop to  98% fat free soups or SF pudding) 3 Tbsp 9   ? *PB2: peanut powder (45 calories) 2 Tbsp 5     *PB2 powdered peanut butter: 45 calories vs. 190 calories in 2 tbsp of regular peanut butter. Purchase online at [employer], or  at various [employer], [employer], [employer] and [employer].      Bariatric Liquid/Pureed Sample  Menu    3-4 small meals plus 2-3 protein drinks per day.    8am 1 egg or ¼ cup Egg Beaters   9am 1 cup water, or decaf coffee or tea   10am Protein drink, 30g protein   11am 2 tbsp low-fat cottage cheese, and 1 tbsp pureed peaches   12pm 1 cup water, or sugar-free lemonade    1pm 2 tbsp pureed chicken, and 1 tbsp pureed carrots    2pm 1 cup water, or sugar-free lemonade   3pm Protein drink, 30g protein   5pm 1 cup water    6pm 1 cup hi-protein creamy chicken soup 14g protein (see Recipe below)   7pm 1 cup water, or sugar-free fruit punch    8pm 1 cup water     This sample menu provides approx. 80g protein and 64oz fluids.  Liquid protein supplements should contain 20-30g protein and less than 4 grams of sugar each.    ? Sip fluids continuously in between meals.  Drink at least ¼ cup every 15 minutes.  ? For fluids: ¼ cup = 2 oz = 4 tbsp       RECIPE IDEAS for Bariatric Pureed Diet:    Hi-Protein Creamy Chicken Soup: (10g protein per 1 cup serving)  Empty 1 can of 98% fat free cream of chicken soup into saucepan. Then  blend 1 scoop of unflavored protein powder with 1 can of skim milk until smooth.  Add protein milk to saucepan and heat to warm. (Note: Do NOT boil. Protein powder may clump if heated too hot).     Hi-Protein Pudding: (14g protein per ½ cup serving)  Add 2 scoops protein powder to 2 cups cold skim milk and mix well.  Stir in dry Jell-O Sugar-Free Instant Pudding mix.  Chill and Enjoy!    Tuna Mousse (12g protein per ¼ cup serving) Page 135 in book Eating Well After Weight Loss Surgery.  In a  or , combine all ingredients and pulse until smooth.  2 6-ounce cans tuna packed in water, drained  2 tbsp low-fat mayonnaise  2 tbsp fat-free sour cream  2 tbsp fat-free cream cheese, softened  ½ cup shallots, finely chopped  1 tbsp lemon juice  ¼ tsp ground pepper  ½ tsp celery seed    Chocolate Peanut Butter Mousse  (28g protein total)  6oz plain Greek yogurt  4 tbsp chocolate PB2

## 2018-11-26 NOTE — PROGRESS NOTES
BARIATRIC FOLLOW UP:    Chief Complaint   Patient presents with    Follow-up     2wk bypass       HISTORY OF PRESENT ILLNESS: Karine Nayak is a 48 y.o. female with a Body mass index is 47.87 kg/m². who presents for follow up s/p conversion of gastric sleeve to RNY with Dr. Hoskins on 11/8/2018. Complicated post op course of initial gastric sleeve surrounding  esophageal perforation on 12/14/16. she is tolerating the diet without difficulty.  she is losing weight appropriately.  She has had occasional vomiting at the very end of protein shake, which she drinks in 10 minutes. Otherwise, she denies nausea, vomiting.     Review of Systems   Constitutional: Negative for chills, fever and malaise/fatigue.   Eyes: Negative for blurred vision and double vision.   Respiratory: Positive for cough (at night). Negative for shortness of breath and wheezing.    Cardiovascular: Negative for chest pain, palpitations and leg swelling.   Gastrointestinal: Negative for abdominal pain, blood in stool, constipation, diarrhea, heartburn, melena, nausea and vomiting.   Genitourinary: Negative for flank pain, frequency and urgency.   Musculoskeletal: Positive for back pain (sciatica). Negative for joint pain, myalgias (2/2 FMG) and neck pain.   Neurological: Negative for dizziness, tingling and headaches.   Psychiatric/Behavioral: Negative for depression and memory loss. The patient is not nervous/anxious.      EXERCISE & VITAMINS:  Not taking B complex, otherwise adherent with bariatric vitamin regimen. Rec'd Bariatric Advantage and will start today.  Exercise- none    MEDICATIONS/ALLERGIES:  Have been reviewed.    DIET:  Liquid Bariatric Diet.  3 protein shakes daily, ~90+ grams protein.  64 oz H20 and Clear SF Liquids.    See dietitian's note from today for further details.     Vitals:    11/26/18 1003   BP: 132/73   Pulse: 79       Physical Exam   Constitutional: She is oriented to person, place, and time. She appears well-developed  and well-nourished.   HENT:   Head: Normocephalic and atraumatic.   Eyes: Conjunctivae and EOM are normal.   Neck: Neck supple.   Cardiovascular: Normal rate, regular rhythm and intact distal pulses. Exam reveals no friction rub.   No murmur heard.  Pulmonary/Chest: Effort normal. No respiratory distress. She has no wheezes. She has no rales.   Abdominal: Soft. Bowel sounds are normal. She exhibits no distension. There is no tenderness. There is no rebound and no guarding. No hernia.   WHSS   Musculoskeletal: Normal range of motion. She exhibits no edema.   Neurological: She is alert and oriented to person, place, and time.   Skin: Skin is warm and dry. Capillary refill takes less than 2 seconds.   Psychiatric: She has a normal mood and affect. Her behavior is normal.   Vitals reviewed.      ASSESSMENT:  - Morbid obesity, Body mass index is 47.87 kg/m².,   s/p conversion of gastric sleeve to RNY with Dr. Hoskins on 11/8/2018.  - Estimated goal weight is 25% EWL  - Co-morbidities: HTN, HLD, COPD, MICHELINE on CPAP, OCD, anemia, esophageal dysphagia  - Good Weight loss, 33 lbs, 16% EWL  - No Exercise regimen  - Fair Vitamin Regimen  - Good Diet    PLAN:  - Regular exercise and adherence to bariatric diet to achieve maximum weight loss.  - Follow-up with dietician to advance diet.  - SLOW DOWN INTAKE, tiny sips, 25-30 minutes per shake.  - Full bariatric vitamin regimen  The patient verbalized understanding of the risks of thiamine deficiency, including and not limited to permanent neurologic deficits/damage and blindness. Agrees to take vitamins as directed.  - Ursodiol 500 mg daily for 6 months for the gallbladder.  - Anti-Acid medication, Omeprazole daily for 3 months.  - Lifting restriction, no lifting more than 10 lbs for 6 weeks total.  - Miralax daily for constipation, no fiber.  - No NSAIDs, Tylenol for pain.  - Can swallow whole pills on 5/8/2018.  - RTC per post op schedule.  - Call the office for any issues.  -  Check labs as scheduled.    20 minute visit, over 50% of time spent counseling patient face to face on diet, exercise, and weight loss.

## 2018-11-26 NOTE — PROGRESS NOTES
NUTRITION NOTE    Referring Physician: Ronal Gutierrez M.D.  Reason for MNT Referral: Follow-up 2 Weeks s/p Gastric Bypass revision    PAST MEDICAL HISTORY:  Denies constipation and diarrhea. Did have some nausea/vomiting after protein shakes but  Is drinking too fast - will start to slow down.  Reports doing well.    Past Medical History:   Diagnosis Date    SUZIE (acute kidney injury)     Anemia     Anxiety     Fibromyalgia     Hyperlipidemia     Hypertension     Inflammatory osteoarthritis     OCD (obsessive compulsive disorder)     MICHELINE on CPAP     Pleuritic chest pain     Rectocele     Scoliosis     Sycosis     Thyroid disease     Traumatic perforation of esophagus 11 '15    During original Gastric Sleeve surgery       CLINICAL DATA:  48 y.o. female.    Current Weight: 314 lbs  BMI: 47.87    LABS:  Reviewed. B1 not available yet.    CURRENT DIET:  Bariatric Liquid Diet    Diet Recall: 90 grams of protein/day; 80 oz of fluids/day (16oz water+ 64oz vitamin water)    Protein Supplements: 3 shakes/day (body fortress)    EXERCISE:  Adequate light exercise. stairs and in house walking    Restrictions to Exercise: None.    VITAMINS/MINERALS:  NP reviewed - see note for details on 11/26  ASSESSMENT:  Doing well overall.  Adequate protein intake.  Adequate fluid intake.    BARIATRIC DIET DISCUSSION:  Instructed and provided written materials on bariatric pureed diet plan.  Reinforced post-op nutrition guidelines.    PLAN/RECOMMONDATIONS:  Advance to bariatric pureed diet.  Maintain protein intake. Slow down drinking protein drinks.  Decrease fluid intake.  Increase light exercise.  Continue appropriate vitamins & minerals.    Return to clinic in 2 weeks.    SESSION TIME: 15 minutes

## 2018-11-28 LAB — VIT B1 BLD-MCNC: 30 UG/L (ref 38–122)

## 2018-11-30 ENCOUNTER — TELEPHONE (OUTPATIENT)
Dept: BARIATRICS | Facility: CLINIC | Age: 48
End: 2018-11-30

## 2018-11-30 DIAGNOSIS — E51.9 THIAMINE DEFICIENCY: Primary | ICD-10-CM

## 2018-11-30 RX ORDER — THIAMINE HYDROCHLORIDE 100 MG/ML
100 INJECTION, SOLUTION INTRAMUSCULAR; INTRAVENOUS WEEKLY
Status: DISCONTINUED | OUTPATIENT
Start: 2018-11-30 | End: 2018-12-03

## 2018-11-30 NOTE — TELEPHONE ENCOUNTER
Spoke with patient about thiamine results, states that she is currently taking her B complex with 50mg of thiamine daily. Not complaining of any muscle weakness, increased fatigue speech disturbances or numbness/tingling/confusion but is having some blurry vision as of yesterday. Will order thiamine injections patient request the closest hospital which [facility] be [facility], will send information to bariatric RN's to set up.    -Moises Kaplan PA-C

## 2018-12-03 DIAGNOSIS — E51.9 THIAMINE DEFICIENCY: Primary | ICD-10-CM

## 2018-12-03 RX ORDER — THIAMINE HYDROCHLORIDE 100 MG/ML
100 INJECTION, SOLUTION INTRAMUSCULAR; INTRAVENOUS
Status: DISCONTINUED | OUTPATIENT
Start: 2018-12-03 | End: 2020-11-04 | Stop reason: ALTCHOICE

## 2018-12-03 NOTE — TELEPHONE ENCOUNTER
Angela Hinton called me and stated she was not in the office on Friday when I called. She will contact the patient and arrange 3 days of B1 injections

## 2018-12-05 ENCOUNTER — CLINICAL SUPPORT (OUTPATIENT)
Dept: INFECTIOUS DISEASES | Facility: CLINIC | Age: 48
End: 2018-12-05
Payer: MEDICARE

## 2018-12-05 PROCEDURE — 96372 THER/PROPH/DIAG INJ SC/IM: CPT | Mod: PBBFAC

## 2018-12-05 RX ADMIN — THIAMINE HYDROCHLORIDE 100 MG: 100 INJECTION, SOLUTION INTRAMUSCULAR; INTRAVENOUS at 10:12

## 2018-12-06 ENCOUNTER — CLINICAL SUPPORT (OUTPATIENT)
Dept: INFECTIOUS DISEASES | Facility: CLINIC | Age: 48
End: 2018-12-06
Payer: MEDICARE

## 2018-12-06 PROCEDURE — 96372 THER/PROPH/DIAG INJ SC/IM: CPT | Mod: PBBFAC

## 2018-12-06 RX ADMIN — THIAMINE HYDROCHLORIDE 100 MG: 100 INJECTION, SOLUTION INTRAMUSCULAR; INTRAVENOUS at 10:12

## 2018-12-07 ENCOUNTER — CLINICAL SUPPORT (OUTPATIENT)
Dept: INFECTIOUS DISEASES | Facility: CLINIC | Age: 48
End: 2018-12-07
Payer: MEDICARE

## 2018-12-07 PROCEDURE — 99213 OFFICE O/P EST LOW 20 MIN: CPT | Mod: PBBFAC,25

## 2018-12-07 PROCEDURE — 99999 PR PBB SHADOW E&M-EST. PATIENT-LVL III: CPT | Mod: PBBFAC,,,

## 2018-12-07 PROCEDURE — 96372 THER/PROPH/DIAG INJ SC/IM: CPT | Mod: PBBFAC

## 2018-12-07 RX ADMIN — THIAMINE HYDROCHLORIDE 100 MG: 100 INJECTION, SOLUTION INTRAMUSCULAR; INTRAVENOUS at 10:12

## 2018-12-10 NOTE — PROGRESS NOTES
BARIATRIC FOLLOW UP:    Chief Complaint   Patient presents with    Follow-up       HISTORY OF PRESENT ILLNESS: Karine Nayak is a 48 y.o. female with a Body mass index is 47.67 kg/m². who presents for follow up s/p conversion of gastric sleeve to RNY with Dr. Hoskins on 11/8/2018. Complicated post op course of initial gastric sleeve surrounding  esophageal perforation on 12/14/16. she is tolerating the diet without difficulty.  she is losing weight appropriately.  LBM 4 days ago. Talking Miralax 1 cap daily.     Review of Systems   Constitutional: Negative for chills, fever and malaise/fatigue.   Eyes: Negative for blurred vision and double vision.   Respiratory: Negative for cough, shortness of breath and wheezing.    Cardiovascular: Negative for chest pain, palpitations and leg swelling.   Gastrointestinal: Positive for constipation. Negative for abdominal pain, blood in stool, diarrhea, heartburn, melena, nausea and vomiting.   Genitourinary: Negative for frequency and urgency.   Musculoskeletal: Positive for back pain (sciatica). Negative for joint pain, myalgias (2/2 FMG) and neck pain.   Neurological: Negative for dizziness, tingling and headaches.   Psychiatric/Behavioral: Negative for depression and memory loss. The patient is not nervous/anxious.      EXERCISE & VITAMINS:  Taking B complex, cannot tell me thiamine content  Exercise- none    DIET:  Puree Bariatric Diet.  3 protein shakes daily, maybe 1-2 puree meals a day ~100+ grams protein.  60+ oz H20 and Clear SF Liquids.    See dietitian's note from today for further details.     Vitals:    12/11/18 1008   BP: 116/64   Pulse: 102       Physical Exam   Constitutional: She is oriented to person, place, and time. She appears well-developed and well-nourished.   HENT:   Head: Normocephalic and atraumatic.   Eyes: Conjunctivae and EOM are normal.   Neck: Neck supple.   Cardiovascular: Normal rate, regular rhythm and intact distal pulses.   Pulmonary/Chest:  Effort normal. No respiratory distress.   Abdominal: Soft. Bowel sounds are normal. She exhibits no distension and no mass. There is no tenderness. There is no rebound and no guarding. No hernia.   WHSS   Musculoskeletal: Normal range of motion. She exhibits no edema.   Neurological: She is alert and oriented to person, place, and time.   Skin: Skin is warm and dry. Capillary refill takes less than 2 seconds.   Psychiatric: She has a normal mood and affect. Her behavior is normal.   Vitals reviewed.    ASSESSMENT:  - Morbid obesity, Body mass index is 47.67 kg/m².,   s/p conversion of gastric sleeve to RNY with Dr. Hoskins on 11/8/2018.  - Estimated goal weight is 25% EWL  - Co-morbidities: HTN, HLD, COPD, MICHELINE on CPAP, OCD, anemia, esophageal dysphagia  - Good Weight loss, 33.3 lbs, 17% EWL  - No Exercise regimen  - Good Vitamin Regimen  - Good Diet  - Thiamine deficiency  - Constipation    PLAN:  - Regular exercise and adherence to bariatric diet to achieve maximum weight loss.  - Follow-up with dietician to advance diet.  - Full bariatric vitamin regimen  - Ensure you are taking at least 50mg B1 daily.   The patient verbalized understanding of the risks of thiamine deficiency, including and not limited to permanent neurologic deficits/damage and blindness.  - Check B1.  - Ursodiol 500 mg daily for 6 months for the gallbladder.  - Anti-Acid medication, Omeprazole daily for 3 months.  - Lifting restriction, no lifting more than 10 lbs for 6 weeks total.  - Miralax daily for constipation, no fiber. May increase to two capfuls.   If no BM within 2 days, she agrees to call us for possible KUB.  - No NSAIDs, Tylenol for pain.  - Can swallow whole pills on 5/8/2018.  - RTC per post op schedule.  - Call the office for any issues.  - Check labs as scheduled.    20 minute visit, over 50% of time spent counseling patient face to face on diet, exercise, and weight loss.

## 2018-12-11 ENCOUNTER — LAB VISIT (OUTPATIENT)
Dept: LAB | Facility: HOSPITAL | Age: 48
End: 2018-12-11
Payer: MEDICARE

## 2018-12-11 ENCOUNTER — CLINICAL SUPPORT (OUTPATIENT)
Dept: BARIATRICS | Facility: CLINIC | Age: 48
End: 2018-12-11
Payer: MEDICARE

## 2018-12-11 ENCOUNTER — OFFICE VISIT (OUTPATIENT)
Dept: BARIATRICS | Facility: CLINIC | Age: 48
End: 2018-12-11
Payer: MEDICARE

## 2018-12-11 VITALS
WEIGHT: 293 LBS | SYSTOLIC BLOOD PRESSURE: 116 MMHG | HEART RATE: 102 BPM | HEIGHT: 68 IN | DIASTOLIC BLOOD PRESSURE: 64 MMHG | BODY MASS INDEX: 44.41 KG/M2

## 2018-12-11 DIAGNOSIS — E51.9 THIAMIN DEFICIENCY: ICD-10-CM

## 2018-12-11 DIAGNOSIS — R63.4 WEIGHT LOSS: ICD-10-CM

## 2018-12-11 DIAGNOSIS — Z98.84 HISTORY OF ROUX-EN-Y GASTRIC BYPASS: ICD-10-CM

## 2018-12-11 DIAGNOSIS — Z98.890 POSTOPERATIVE STATE: Primary | ICD-10-CM

## 2018-12-11 DIAGNOSIS — K59.00 CONSTIPATION, UNSPECIFIED CONSTIPATION TYPE: ICD-10-CM

## 2018-12-11 PROCEDURE — 99999 PR PBB SHADOW E&M-EST. PATIENT-LVL IV: CPT | Mod: PBBFAC,,, | Performed by: NURSE PRACTITIONER

## 2018-12-11 PROCEDURE — 36415 COLL VENOUS BLD VENIPUNCTURE: CPT

## 2018-12-11 PROCEDURE — 99024 POSTOP FOLLOW-UP VISIT: CPT | Mod: POP,,, | Performed by: NURSE PRACTITIONER

## 2018-12-11 PROCEDURE — 99214 OFFICE O/P EST MOD 30 MIN: CPT | Mod: PBBFAC | Performed by: NURSE PRACTITIONER

## 2018-12-11 PROCEDURE — 99499 UNLISTED E&M SERVICE: CPT | Mod: S$PBB,,, | Performed by: DIETITIAN, REGISTERED

## 2018-12-11 PROCEDURE — 84425 ASSAY OF VITAMIN B-1: CPT

## 2018-12-11 NOTE — PROGRESS NOTES
NUTRITION NOTE    Referring Physician: Ronal Gutierrez M.D.  Reason for MNT Referral: Follow-up 4 Weeks s/p Gastric Bypass    PAST MEDICAL HISTORY:  Denies vomiting and diarrhea. Has nausea on occasion - will take meds. Last BM was 4 days ago - small - taking miralax daily.  Reports doing well.    Past Medical History:   Diagnosis Date    SUZIE (acute kidney injury)     Anemia     Anxiety     Fibromyalgia     Hyperlipidemia     Hypertension     Inflammatory osteoarthritis     OCD (obsessive compulsive disorder)     MICHELINE on CPAP     Pleuritic chest pain     Rectocele     Scoliosis     Sycosis     Thyroid disease     Traumatic perforation of esophagus 11 '15    During original Gastric Sleeve surgery       CLINICAL DATA:  48 y.o. female.    Current Weight: 313 lbs  BMI: 47.67    LABS:  B1 in process.    CURRENT DIET:  Bariatric PureedDiet    Diet Recall: 95 grams of protein/day; 64 oz of fluids/day    Lunch: egg or fish or yogurt    Meal Pattern: 1 meal(s) + 3 protein supplement(s) - premier protein  Adequate protein supplement intake.    EXERCISE:  None.  Will start walking soon  Restrictions to Exercise: None.    VITAMINS/MINERALS:  NP reviewed - see note for details    ASSESSMENT:  Doing fairly well overall.  Adequate protein intake.  Adequate fluid intake.  Advancing diet appropriately.  Not exercising.  Adequate vitamins & minerals.    BARIATRIC DIET DISCUSSION:  Instructed and provided written materials on bariatric soft diet plan.  Reinforced post-op nutrition guidelines.    PLAN / RECOMMENDATIONS:  Advance to bariatric soft diet.  Maintain protein intake.  Maintain fluid intake.  Begin light exercise.  Continue appropriate vitamins & minerals.    Return to clinic in 2 months.    SESSION TIME: 15 minutes

## 2018-12-11 NOTE — PATIENT INSTRUCTIONS
Bariatric Soft Diet           - Start Soft Diet 2 weeks after gastric banding  -   Start Soft Diet 4 weeks after gastric bypass and sleeve    As your stomach heals, your doctor will progress your diet to soft foods.  This diet usually lasts for 2-3 months, but can last longer depending on each individual. Soft foods are those which can be easily mashed with a fork.    Remember these principles:   No liquids with meals. Do no drink 30 minutes before meals and wait 30 minutes to 1 hour after meals to start drinking.   Sip on water, sugar-free beverages or non-fat milk throughout the day.  You will need to continue drinking at least 1 protein drink daily to meet protein needs.   100% fruit juice (no sugar added) is allowed, but limit to 4oz a day because it is high in calories and does not contain any protein.   Chew foods slowly; one meal should take 20-30 minutes.   Eat 3-5 meals per day, without any additional snacking.   Stop eating as soon as you feel full.   Avoid using table sugar and foods made with refined sugar, which can trigger dumping syndrome.   Marinating meats with a low sugar marinade, adding low-fat salad dressing, or adding low calorie gravy (made from powder and water) can help meats to digest easier.     Adding Vegetables and Fruits:    As long as you are consuming >80g total protein daily from combination of foods and protein drinks, you may start adding small bites of fruits and vegetables to your meals. Cooked, tender vegetables and ripe fruits without the peel are tolerated best.    Avoid fruit canned in syrup, sugary fruit juices, and vegetables cooked with oil, butter or chen.  Bariatric SOFT Diet    EAT THESE FOODS AVOID THESE FOODS   High in Protein: High in Fat/Sugar:   ? Canned tuna or chicken (packed in water)  ? Lean ground turkey breast or ground round  ? Turkey or chicken (no skin); cooked tender and cut in small pieces  ? Lean pork or beef (cook in crock pot until very  tender; cut in small pieces  ? Scrambled, poached, or boiled eggs  ? Baked, broiled, grilled or boiled fish and seafood (not fried!)  ? Silken tofu, Edamame (soybeans)  ? Beans, hummus and lentils  ? Lean deli meats (turkey and chicken breast, ham, roast beef)  ? 1% or Skim Milk, Lactaid, or Soymilk  ? Low-fat or fat-free cottage cheese, soft cheese, mozzarella string cheese, or ricotta  ? Light yogurt, Greek yogurt, SF pudding High fat milk (whole, 2%)  Butter, margarine, oil, mayonnaise  Sour cream, cream cheese, salad dressing  Ice Cream  Cakes, cookies, pies, desserts  Candy  Luncheon meats (bologna, salami, chopped ham)  Sausage, [place]  Gravy  Fried Foods  ___________________________________  Tough/Crunchy--------------------------------  Tough or dry meats  Corn   Granola/cereal with nuts  Shredded Coconut    May add after 3 months:  Raw veggies  Lettuce  Plain, Unsalted Nuts and Seeds  Protein bars with 0-4 grams of sugar   As long as you are getting >80g PRO: Starchy Carbohydrates. At goal weight, some may include whole grains in small amounts.   Cooked tender vegetables without peel  Ripe fruits without peel  Frozen fruits with no added sugar  Fruit canned in its own juice or in water  Fat free, sugar free, frozen yogurt White and wheat Bread, Rice, Pasta   Cereals (including grits, oatmeal)   Crackers, Pretzels, Chips, Granola  Corn, Popcorn, Peas  White Potatoes, Sweet potatoes  Flour and corn tortillas     Fluids: Always Avoid:   Skim/1% milk, Lactaid, Soymilk  Water and Sugar-free beverages  (decaf and non-carbonated)  Decaf coffee & decaf tea  Sugary drinks  Carbonated drinks  Alcohol  Drinking through straws     Protein Content of Foods Recommended after                   Weight Loss Surgery    Food Name Portion Calories Protein (gms)   Almonds (unsalted) 1/4 cup 160 6   [place] milk, unsweetened 1 cup 30  1   Beef, Roast 1 oz 46 8   Beef, Steak, sirloin, trimmed 1 oz 55 9   Catfish, broiled or baked 1  oz 30 5   Cheese, American FF 1 oz 40 6   Cheese, Cottage 1% fat ¼ cup 41 7   Cheese, Parmesan, grated ¼ cup 128 12   Cheese, Mozzarella, part skim 1 oz 78 8   Cheese, part skim Ricotta ¼ cup 90 8   Chicken, white breast w/o skin 1 oz 46 9   Chicken, leg w/o skin 1 oz 54 7   Crab, steamed ¼ cup  40 9   Crawfish tails, boiled ¼ cup 35 8   Edamame, shelled ¼ cup 50 4   Egg 1 78 6   Ham, lean 5% 1 oz 44 7   Hamburger, lean 1 oz 56 7   Hummus ¼ cup 100 5   Lobster, steamed 1 oz 26 5   Milk, skim or 1%, soy  1 cup 90 8   Pork Tenderloin 1 oz 46 7   Pudding, SF 1 serv 60 2   Red beans ¼ cup 56 4   Refried beans, fat free ¼ cup 65 4   [place], baked 1 oz 52 7   Shrimp, steamed 1 oz 28 6   Soymilk, plain ½ cup 40 3   [place]ia, white fish, cooked 1 oz 36 8   Tofu ¼ cup 47 5   Trout 1 oz 48 7   Tuna, canned in water 1 oz 37 8   Turkey, white meat 1 oz 35 7   Veal Loin 1 oz 50 7   Yogurt, SF, frozen vanilla 3 oz 72 3.5   Yogurt, Fruit, FF, light 3 oz 40 2.5   Yogurt, Greek 3 oz 70 8     *Abbreviations: SF=sugar free, LF=low fat, FF= fat free, gms=grams  *3oz of cooked meat/protein = size of deck of cards or ladies palm   *1oz cheese = 1inch cube or 1 slice American cheese    Sample Menu for Bariatric Soft Diet  For Gastric Bypass and Sleeve            3 meals + 2 protein drinks  Remember: No drinking with meals.    Time of Day Day 1 Day 2   7am:    1 egg (or ¼ cup Egg Beaters) ¼ cup low-fat cottage cheese, 1 tbsp berries   8am: 1 cup water/SF beverage     9am: 1 cup water/SF beverage     10am:  Protein drink  Protein drink   11am: 1 cup water/SF beverage     12pm:    1-2 oz grilled shrimp, ¼ cup green beans   1-2oz canned chicken, shredded cheese, 1 tbsp salsa   1pm: 1 cup water/SF beverage     3pm:  Protein drink   Protein drink   4pm: 1 cup water/SF beverage     6pm:  ½ cup low fat chili, 1oz low-fat cheese, ¼ cup broccoli 2 oz grilled fish,  ¼  cup lima beans   7pm: 1 cup water/SF beverage       This sample menu provides  approx. 80g protein total, including about 40g protein from foods and at least 40g protein from protein drinks.  Drinking protein drinks daily helps decrease muscle loss, increase weight loss, and prevent hair loss.    ? Sip fluids continuously in between meals.    ? For fluids: 1 cup = 8 oz   ? For food: ¼ cup = 4 tablespoons = 1oz  ? No drinking from 30 minutes before meals to 30 minutes after meals.  ? 3oz meat is approx. the size of a deck of cards.    ? A food scale will help you determine portion size (Can be purchased at [employer])

## 2018-12-14 LAB — VIT B1 BLD-MCNC: 72 UG/L (ref 38–122)

## 2019-02-12 ENCOUNTER — LAB VISIT (OUTPATIENT)
Dept: LAB | Facility: HOSPITAL | Age: 49
End: 2019-02-12
Attending: SURGERY
Payer: MEDICARE

## 2019-02-12 ENCOUNTER — OFFICE VISIT (OUTPATIENT)
Dept: BARIATRICS | Facility: CLINIC | Age: 49
End: 2019-02-12
Payer: MEDICARE

## 2019-02-12 ENCOUNTER — CLINICAL SUPPORT (OUTPATIENT)
Dept: BARIATRICS | Facility: CLINIC | Age: 49
End: 2019-02-12
Payer: MEDICARE

## 2019-02-12 VITALS
SYSTOLIC BLOOD PRESSURE: 110 MMHG | WEIGHT: 290.56 LBS | BODY MASS INDEX: 44.04 KG/M2 | HEIGHT: 68 IN | HEART RATE: 84 BPM | DIASTOLIC BLOOD PRESSURE: 59 MMHG

## 2019-02-12 DIAGNOSIS — Z98.890 POSTOPERATIVE STATE: Primary | ICD-10-CM

## 2019-02-12 DIAGNOSIS — I10 HYPERTENSION, UNSPECIFIED TYPE: ICD-10-CM

## 2019-02-12 DIAGNOSIS — E66.01 MORBID OBESITY: ICD-10-CM

## 2019-02-12 DIAGNOSIS — R63.4 WEIGHT LOSS: ICD-10-CM

## 2019-02-12 DIAGNOSIS — E61.1 LOW IRON: ICD-10-CM

## 2019-02-12 DIAGNOSIS — Z98.84 HISTORY OF ROUX-EN-Y GASTRIC BYPASS: ICD-10-CM

## 2019-02-12 DIAGNOSIS — Z79.899 OTHER LONG TERM (CURRENT) DRUG THERAPY: ICD-10-CM

## 2019-02-12 DIAGNOSIS — G47.33 OSA ON CPAP: ICD-10-CM

## 2019-02-12 DIAGNOSIS — E78.5 HYPERLIPIDEMIA, UNSPECIFIED HYPERLIPIDEMIA TYPE: ICD-10-CM

## 2019-02-12 DIAGNOSIS — R79.89 LOW VITAMIN D LEVEL: ICD-10-CM

## 2019-02-12 DIAGNOSIS — M79.7 FIBROMYALGIA: ICD-10-CM

## 2019-02-12 DIAGNOSIS — Z98.84 STATUS POST LAPAROSCOPIC SLEEVE GASTRECTOMY: ICD-10-CM

## 2019-02-12 LAB
25(OH)D3+25(OH)D2 SERPL-MCNC: 20 NG/ML
ALBUMIN SERPL BCP-MCNC: 3.8 G/DL
ALP SERPL-CCNC: 67 U/L
ALT SERPL W/O P-5'-P-CCNC: 22 U/L
ANION GAP SERPL CALC-SCNC: 9 MMOL/L
AST SERPL-CCNC: 13 U/L
BASOPHILS # BLD AUTO: 0.04 K/UL
BASOPHILS NFR BLD: 0.4 %
BILIRUB SERPL-MCNC: 0.5 MG/DL
BUN SERPL-MCNC: 29 MG/DL
CALCIUM SERPL-MCNC: 10.3 MG/DL
CHLORIDE SERPL-SCNC: 102 MMOL/L
CHOLEST SERPL-MCNC: 205 MG/DL
CHOLEST/HDLC SERPL: 2.8 {RATIO}
CO2 SERPL-SCNC: 26 MMOL/L
CREAT SERPL-MCNC: 1 MG/DL
DIFFERENTIAL METHOD: ABNORMAL
EOSINOPHIL # BLD AUTO: 0.3 K/UL
EOSINOPHIL NFR BLD: 2.8 %
ERYTHROCYTE [DISTWIDTH] IN BLOOD BY AUTOMATED COUNT: 15.4 %
EST. GFR  (AFRICAN AMERICAN): >60 ML/MIN/1.73 M^2
EST. GFR  (NON AFRICAN AMERICAN): >60 ML/MIN/1.73 M^2
GLUCOSE SERPL-MCNC: 102 MG/DL
HCT VFR BLD AUTO: 40.1 %
HDLC SERPL-MCNC: 72 MG/DL
HDLC SERPL: 35.1 %
HGB BLD-MCNC: 13.4 G/DL
IMM GRANULOCYTES # BLD AUTO: 0.05 K/UL
IMM GRANULOCYTES NFR BLD AUTO: 0.5 %
IRON SERPL-MCNC: 61 UG/DL
LDLC SERPL CALC-MCNC: 118 MG/DL
LYMPHOCYTES # BLD AUTO: 3.3 K/UL
LYMPHOCYTES NFR BLD: 30.3 %
MCH RBC QN AUTO: 27.6 PG
MCHC RBC AUTO-ENTMCNC: 33.4 G/DL
MCV RBC AUTO: 83 FL
MONOCYTES # BLD AUTO: 0.5 K/UL
MONOCYTES NFR BLD: 4.6 %
NEUTROPHILS # BLD AUTO: 6.7 K/UL
NEUTROPHILS NFR BLD: 61.4 %
NONHDLC SERPL-MCNC: 133 MG/DL
NRBC BLD-RTO: 0 /100 WBC
PLATELET # BLD AUTO: 413 K/UL
PMV BLD AUTO: 9.9 FL
POTASSIUM SERPL-SCNC: 4.4 MMOL/L
PROT SERPL-MCNC: 8.1 G/DL
RBC # BLD AUTO: 4.86 M/UL
SATURATED IRON: 15 %
SODIUM SERPL-SCNC: 137 MMOL/L
TOTAL IRON BINDING CAPACITY: 408 UG/DL
TRANSFERRIN SERPL-MCNC: 276 MG/DL
TRIGL SERPL-MCNC: 75 MG/DL
VIT B12 SERPL-MCNC: 740 PG/ML
WBC # BLD AUTO: 10.85 K/UL

## 2019-02-12 PROCEDURE — 82306 VITAMIN D 25 HYDROXY: CPT

## 2019-02-12 PROCEDURE — 99999 PR PBB SHADOW E&M-EST. PATIENT-LVL V: ICD-10-PCS | Mod: PBBFAC,,, | Performed by: NURSE PRACTITIONER

## 2019-02-12 PROCEDURE — 82607 VITAMIN B-12: CPT

## 2019-02-12 PROCEDURE — 84425 ASSAY OF VITAMIN B-1: CPT

## 2019-02-12 PROCEDURE — 99215 OFFICE O/P EST HI 40 MIN: CPT | Mod: PBBFAC | Performed by: NURSE PRACTITIONER

## 2019-02-12 PROCEDURE — 99024 POSTOP FOLLOW-UP VISIT: CPT | Mod: POP,,, | Performed by: NURSE PRACTITIONER

## 2019-02-12 PROCEDURE — 80053 COMPREHEN METABOLIC PANEL: CPT

## 2019-02-12 PROCEDURE — 99024 PR POST-OP FOLLOW-UP VISIT: ICD-10-PCS | Mod: POP,,, | Performed by: NURSE PRACTITIONER

## 2019-02-12 PROCEDURE — 85025 COMPLETE CBC W/AUTO DIFF WBC: CPT

## 2019-02-12 PROCEDURE — 83540 ASSAY OF IRON: CPT

## 2019-02-12 PROCEDURE — 99999 PR PBB SHADOW E&M-EST. PATIENT-LVL V: CPT | Mod: PBBFAC,,, | Performed by: NURSE PRACTITIONER

## 2019-02-12 PROCEDURE — 80061 LIPID PANEL: CPT

## 2019-02-12 PROCEDURE — 99499 NO LOS: ICD-10-PCS | Mod: S$PBB,,, | Performed by: DIETITIAN, REGISTERED

## 2019-02-12 PROCEDURE — 99499 UNLISTED E&M SERVICE: CPT | Mod: S$PBB,,, | Performed by: DIETITIAN, REGISTERED

## 2019-02-12 RX ORDER — PROMETHAZINE HYDROCHLORIDE AND DEXTROMETHORPHAN HYDROBROMIDE 6.25; 15 MG/5ML; MG/5ML
SYRUP ORAL
Refills: 1 | COMMUNITY
Start: 2019-02-01 | End: 2019-05-23 | Stop reason: SDUPTHER

## 2019-02-12 RX ORDER — UBIDECARENONE 75 MG
500 CAPSULE ORAL DAILY
COMMUNITY
End: 2019-06-19 | Stop reason: DRUGHIGH

## 2019-02-12 RX ORDER — HYDROCODONE BITARTRATE AND ACETAMINOPHEN 5; 325 MG/1; MG/1
1 TABLET ORAL 2 TIMES DAILY
Refills: 0 | COMMUNITY
Start: 2019-02-08 | End: 2019-05-23 | Stop reason: SDUPTHER

## 2019-02-12 RX ORDER — URSODIOL 500 MG/1
TABLET, FILM COATED ORAL
Qty: 90 TABLET | Refills: 0 | Status: SHIPPED | OUTPATIENT
Start: 2019-02-12 | End: 2020-11-04 | Stop reason: ALTCHOICE

## 2019-02-12 RX ORDER — MULTIVITAMIN
1 TABLET ORAL DAILY
COMMUNITY
End: 2020-11-04 | Stop reason: ALTCHOICE

## 2019-02-12 NOTE — PROGRESS NOTES
BARIATRIC FOLLOW UP:    Chief Complaint   Patient presents with    Follow-up     3 month revision to bypass 11/08/18     HISTORY OF PRESENT ILLNESS: Karine Nayak is a 48 y.o. female with a Body mass index is 44.18 kg/m². who presents for follow up s/p conversion of gastric sleeve to RNY with Dr. Hoskins on 11/8/2018. Complicated post op course of initial gastric sleeve surrounding  esophageal perforation on 12/14/16. she is tolerating the diet without difficulty.  she is losing weight appropriately. Denies any dysphagia or globus sensation.    Review of Systems   Constitutional: Negative for chills, fever and malaise/fatigue.   Eyes: Negative for blurred vision and double vision.   Respiratory: Negative for cough, shortness of breath and wheezing.    Cardiovascular: Negative for chest pain, palpitations and leg swelling.   Gastrointestinal: Negative for abdominal pain, blood in stool, constipation, diarrhea, heartburn, melena, nausea and vomiting.   Genitourinary: Negative for frequency and urgency.   Musculoskeletal: Negative for back pain (sciatica), joint pain, myalgias (2/2 FMG) and neck pain.   Neurological: Positive for headaches. Negative for dizziness and tingling.   Psychiatric/Behavioral: Negative for depression and memory loss. The patient is not nervous/anxious.      EXERCISE & VITAMINS:  MVI daily, B12 daily, B complex daily, additional thiamin pill, karis citrate once daily  Exercise- 2 times a week, 30 minutes    DIET:  Reg Bariatric Diet. 2 Premier protein shakes daily, 2 meals (4oz chicken or 2 eggs), 2 snacks (greek yogurt) ~120 grams protein.  50+ oz H20 and Clear SF Liquids.    See dietitian's note from today for further details.     Vitals:    02/12/19 1017   BP:    Pulse: 84       Physical Exam   Constitutional: She is oriented to person, place, and time. She appears well-developed and well-nourished.   HENT:   Head: Normocephalic and atraumatic.   Eyes: Conjunctivae and EOM are normal.   Neck:  Neck supple.   Cardiovascular: Normal rate and regular rhythm.   Pulmonary/Chest: Effort normal. No respiratory distress.   Abdominal: Soft. Bowel sounds are normal. She exhibits no distension. There is no tenderness. There is no guarding.   WHSS   Musculoskeletal: Normal range of motion. She exhibits no edema.   Neurological: She is alert and oriented to person, place, and time.   Skin: Skin is warm and dry. Capillary refill takes less than 2 seconds.   Psychiatric: She has a normal mood and affect. Her behavior is normal.   Vitals reviewed.    ASSESSMENT:  - Morbid obesity, Body mass index is 44.18 kg/m².,   s/p conversion of gastric sleeve to RNY with Dr. Hoskins on 11/8/2018.  - Estimated goal weight is 25% EWL  - Co-morbidities: HTN (stable), HLD (stable), COPD (stable), MICHELINE on CPAP (stable), OCD, anemia, esophageal dysphagia  - Good Weight loss, 57 lbs, 38% EWL  - Fair Exercise regimen  - Poor Vitamin Regimen  - Ok Diet    PLAN:  - Regular exercise and adherence to bariatric diet to achieve maximum weight loss.  - Follow-up with dietician to advance diet.  - Do not exceed 120 gram protein daily.   - Increase hydration  - Full bariatric vitamin regimen. Reviewed bariatric regimen.   - Ursodiol 500 mg daily for 6 months for the gallbladder.  - Anti-Acid medication, Omeprazole daily for 3 months.  - Okay to discontinue omeprazole, slow taper discussed and instructions provided.   - No more lifting restriction  - Miralax daily for constipation, no fiber.  - No NSAIDs, Tylenol for pain.  - Can swallow whole pills on 5/8/2018.  - RTC per post op schedule.  - Call the office for any issues.  - Check labs - increase hydration, increase MVI with Fe to twice daily as discussed.    20 minute visit, over 50% of time spent counseling patient face to face on diet, exercise, and weight loss.

## 2019-02-12 NOTE — PATIENT INSTRUCTIONS
- BF: MVI (with iron) and SL Vitamin B12 500 mcg and B complex  - snack: 2 calcium citrate tablets  - JOHNSON: 2 calcium citrate tablets  - snack: 2 calcium citrate tablets  - DI: MVI (with iron)    **Total of 1500mg calcium daily      How to taper off of Omeprazole:  - Take 1 Tablet every other day for 2 weeks.  If you do not experience any heartburn, indigestion, nausea symptoms, the following week, take 1 tablet every 3 days.  Again if you remain without the above symptoms, you may completely discontinue the medication.  If symptoms return at any point, please restart the medication.      Fruits and Vegetables       Include 1-2 servings of fruit daily.      1 serving of fruit includes ½ cup unsweetened applesauce, ½ medium banana, tennis ball size piece of fruit, 17 grapes, 1 cup melon, 1 cup strawberries, ¼ cup dried fruit     Include 2-3 servings of vegetables daily. 1 serving is 1 cup raw or ½ cup cooked.     Non-starchy vegetables include artichoke, asparagus, baby corn, bamboo shoots, beans: green/Italian/wax, bean sprouts, beets, broccoli, [place] sprouts, cabbage, carrots, cauliflower, celery, cucumber, eggplant, green onions or scallions, greens, jicama, leeks, mushrooms, okra, onions, pea pods, peppers, radishes, spinach, summer squash, tomatoes and salsa, turnips, vegetable juice cocktail, water chestnuts, zucchini      Remember these principles:   No liquids with meals. Do no drink 30 minutes before meals and wait 30 minutes to 1 hour after meals to start drinking.   Eat PROTEIN rich foods first at every meal or snack.   Sip on water, sugar-free beverages or non-fat milk throughout the day.  You will need to continue drinking at least 1 protein drink daily to meet protein needs.   100% fruit juice (no sugar added) is allowed, but limit to 4oz a day because it is high in calories and does not contain any protein.   Chew foods slowly; one meal should take 20-30 minutes.   Eat 5 meals (3 solid meals  and 2 protein shakes) per day, without any additional snacking.   Stop eating as soon as you feel full.   Avoid using table sugar and foods made with refined sugar, which can trigger dumping syndrome.   Marinating meats with a low sugar marinade, adding low-fat salad dressing, or adding low calorie gravy (made from powder and water) can help meats to digest easier.     May add:  Raw veggies  Lettuce: start with baby spinach leaves  Plain, Unsalted Nuts and Seeds: almonds, peanuts, pistachios.  1 serving daily or less  Protein bars with 0-4 grams of sugar, see attached handout    Snacks: (100-200 calories; >5g protein)    - 1 low-fat cheese stick with 8 cherry tomatoes or 1 serving fresh fruit  - 4 thin slices fat-free turkey breast and 1 slice low-fat cheese  - 4 thin slices fat-free honey ham with wedge of melon  - 2 slices of turkey chen  - Boiled eggs (can buy at [employer] already boiled w/ shell removed)  - for convenience,  [place] read, snack, go (deli meat and cheese rolls)  - P3 packets (Protein packs w/ cheese, nuts, lean deli meat)  - MHP Fit and Lean Protein Pudding (find at [employer] - per 1 cup serving = 100 calories, 15 g protein, 0 g sugar)  - 6-8 edamame pods (equivalent to about 1/4 cup edamame without pods).   - 1/4 cup unsalted nuts with ½ cup fruit  - 6-oz container Dannon Light n Fit vanilla yogurt, topped with 1oz unsalted nuts         - apple, celery or baby carrots spread with 2 Tbsp PB2  - apple slices with 1 oz slice low-fat cheese  - Apple slices dipped in 2 Tbsp of PB2  - 2 Tbsp PB2 mixed in light or greek yogurt or sugar-free pudding  - celery, cucumber, bell pepper or baby carrots dipped in ¼ cup hummus bean spread   - celery, cucumber, baby carrots dipped in high protein greek yogurt (Mix 16 oz plain greek yogurt + 1 packet of [place] ranch dip mix)  - [place] Links Beef Steak - 14g protein! (similar to beef jerky but very lean)  - 2 wedges Laughing Cow - Light Herb &  "Garlic Cheese with sliced cucumber or green bell pepper  - 1/2 cup low-fat cottage cheese with ¼ cup fruit or ¼ cup salsa  - 1/2 cup low fat cottage cheese with 10-15 cherry tomatoes  - 8 oz glass of FAIRLIFE fat free milk (13 g protein)  - 8 oz glass of FAIRLIFE fat free milk + 1 packet of sugar-free hot cocoa  - Add Atkins advantage Cafe Caramel shake to decaf coffee. Serve hot or blend with ice for "frappaccino" like drink  - RTD Protein drinks: Atkins, Low Carb Slim Fast, EAS light, Muscle Milk Light, etc.  - Homemade Protein drinks: GNC Soy95, Isopure, Nectar, UNJURY, Whey Gourmet, etc. Mix 1 scoop powder with 8oz skim/1% milk or light soymilk.  - Protein bars: Atkins, EAS, Pure Protein,  Quest, Think Thin, Detour, etc. Must have 0-4 grams sugar - Read the label.    ** Be CREATIVE. You can always snack on bites of grilled chicken or tuna salad made with low fat pal, if needed!       "

## 2019-02-12 NOTE — PROGRESS NOTES
NUTRITION NOTE    Referring Physician: Vitaliy Hoskins M.D.  Reason for MNT Referral: Follow-up 3 months s/p Conversion from Sleeve to Gastric Bypass    PAST MEDICAL HISTORY:    Denies nausea, vomiting, constipation and diarrhea.  Reports doing well.    Past Medical History:   Diagnosis Date    SUZIE (acute kidney injury)     Anemia     Anxiety     Fibromyalgia     Hyperlipidemia     Hypertension     Inflammatory osteoarthritis     OCD (obsessive compulsive disorder)     MICHELINE on CPAP     Pleuritic chest pain     Rectocele     Scoliosis     Sycosis     Thyroid disease     Traumatic perforation of esophagus 11 '15    During original Gastric Sleeve surgery       CLINICAL DATA:  48 y.o. female.    Excess Weight Loss: 28%    LABS:  Reviewed.    CURRENT DIET:  Bariatric Diet.  Diet Recall:  grams of protein/day; 60 oz of fluids/day    - Premier shake  - 2 eggs  - Premier shake  - greek yogurt OR sf popsicle  - 4oz chicken + small scoop green beans or red beans    Diet includes  Meal Pattern: 2 meal(s) + 1 snack(s) + 2 protein supplement(s)  Adequate protein supplement intake. Premier shakes.  Adequate dairy intake.  Adequate vegetable intake. No raw vegetables and lettuce yet.  Adequate fruit intake.  Starchy CHO: avoids    EXERCISE:  Adequate light exercise.    Restrictions to Exercise: None.    VITAMINS / MINERALS:  See BA    ASSESSMENT:  Doing well overall.  Weight loss great.  Adequate calorie intake.  Adequate protein intake.  Adequate fluid intake.  Following diet appropriately.  Exercising.  Inadequate vitamins & minerals.    BARIATRIC DIET DISCUSSION:  Instructed and provided written materials on bariatric diet plan.  Reinforced post-op nutrition guidelines.    PLAN / RECOMMENDATIONS:  May begin to incorporate raw vegetables, lettuce, unsalted nuts, and protein bars as tolerated.  May begin to swallow whole pills as tolerated.  Continue excellent diet plan.  Adjust diet by limiting meat to  2-3oz per meal, per NP. Increase veggies if needed.  Maintain protein intake.  Maintain fluid intake.  Increase exercise.  - goal of 3-4 days/week  Continue appropriate vitamins & minerals.  Adjust vitamins & minerals by increasing MV with Iron to twice/day, increased Ca chews to 3/day, begin taking B12 sublingual (under the tongue for at least 30 sec).    Return to clinic in 3 months.    SESSION TIME: 15 minutes

## 2019-02-13 DIAGNOSIS — E55.9 VITAMIN D DEFICIENCY: Primary | ICD-10-CM

## 2019-02-13 RX ORDER — ERGOCALCIFEROL 1.25 MG/1
50000 CAPSULE ORAL
Qty: 24 CAPSULE | Refills: 0 | Status: SHIPPED | OUTPATIENT
Start: 2019-02-14 | End: 2020-11-04 | Stop reason: ALTCHOICE

## 2019-02-15 ENCOUNTER — TELEPHONE (OUTPATIENT)
Dept: BARIATRICS | Facility: CLINIC | Age: 49
End: 2019-02-15

## 2019-02-15 NOTE — TELEPHONE ENCOUNTER
Spoke with pt.  Pt is aware she needs to increase her iron from her multivitamin to 18 mg per day.  Discussed low Vit D and to  and take 50,000 IU twice a week.  Pt expressed understanding of information presented as evidenced by appropriate questions and concerns.  ----- Message from Vitaliy Hoskins Jr., MD sent at 2/13/2019  9:26 AM CST -----  Vit D is low.  Script called in.  Iron is also low.  Can you please call and discuss withher.  Thanks.

## 2019-02-16 LAB — VIT B1 BLD-MCNC: >160 UG/L (ref 38–122)

## 2019-03-04 DIAGNOSIS — E66.01 MORBID OBESITY: ICD-10-CM

## 2019-03-07 RX ORDER — POLYETHYLENE GLYCOL 3350 17 G/17G
POWDER, FOR SOLUTION ORAL
Qty: 527 G | Refills: 3 | OUTPATIENT
Start: 2019-03-07

## 2019-04-05 DIAGNOSIS — E66.01 MORBID OBESITY: ICD-10-CM

## 2019-04-08 RX ORDER — POLYETHYLENE GLYCOL 3350 17 G/17G
POWDER, FOR SOLUTION ORAL
Qty: 527 G | Refills: 3 | OUTPATIENT
Start: 2019-04-08

## 2019-06-19 PROBLEM — N95.1 SYMPTOMATIC MENOPAUSAL OR FEMALE CLIMACTERIC STATES: Status: ACTIVE | Noted: 2019-06-19

## 2019-06-19 PROBLEM — G89.4 CHRONIC PAIN SYNDROME: Status: ACTIVE | Noted: 2019-06-19

## 2019-06-19 PROBLEM — Z51.81 ENCOUNTER FOR THERAPEUTIC DRUG MONITORING: Status: ACTIVE | Noted: 2019-06-19

## 2019-06-19 PROBLEM — N95.1 PERIMENOPAUSAL VASOMOTOR SYMPTOMS: Status: ACTIVE | Noted: 2019-06-19

## 2019-06-19 PROBLEM — K21.00 GASTROESOPHAGEAL REFLUX DISEASE WITH ESOPHAGITIS: Status: ACTIVE | Noted: 2019-06-19

## 2019-06-19 PROBLEM — E55.9 VITAMIN D DEFICIENCY: Status: ACTIVE | Noted: 2019-06-19

## 2019-06-19 PROBLEM — D51.8 ANEMIA OF DECREASED VITAMIN B12 ABSORPTION: Status: ACTIVE | Noted: 2019-06-19

## 2019-06-19 PROBLEM — Z71.3 DIETARY COUNSELING: Status: ACTIVE | Noted: 2019-06-19

## 2019-06-19 PROBLEM — F11.90 CHRONIC, CONTINUOUS USE OF OPIOIDS: Status: ACTIVE | Noted: 2019-06-19

## 2019-06-19 PROBLEM — F51.02 ADJUSTMENT INSOMNIA: Status: ACTIVE | Noted: 2019-06-19

## 2019-06-19 PROBLEM — Z79.899 ENCOUNTER FOR LONG-TERM (CURRENT) USE OF MEDICATIONS: Status: ACTIVE | Noted: 2019-06-19

## 2019-06-19 PROBLEM — E04.2 MULTINODULAR NON-TOXIC GOITER: Status: ACTIVE | Noted: 2019-06-19

## 2019-09-19 PROBLEM — E04.2 MULTINODULAR NON-TOXIC GOITER: Chronic | Status: ACTIVE | Noted: 2019-06-19

## 2019-09-19 PROBLEM — E66.01 MORBID OBESITY: Chronic | Status: ACTIVE | Noted: 2018-10-24

## 2019-09-19 PROBLEM — Z98.84 H/O BARIATRIC SURGERY: Chronic | Status: ACTIVE | Noted: 2018-10-24

## 2019-10-06 ENCOUNTER — HOSPITAL ENCOUNTER (EMERGENCY)
Facility: HOSPITAL | Age: 49
Discharge: HOME | End: 2019-10-06
Attending: EMERGENCY MEDICINE
Payer: MEDICARE

## 2019-10-06 VITALS
BODY MASS INDEX: 46.39 KG/M2 | HEART RATE: 89 BPM | WEIGHT: 293 LBS | TEMPERATURE: 98 F | RESPIRATION RATE: 16 BRPM | OXYGEN SATURATION: 99 % | SYSTOLIC BLOOD PRESSURE: 135 MMHG | DIASTOLIC BLOOD PRESSURE: 82 MMHG

## 2019-10-06 DIAGNOSIS — R05.9 COUGH: ICD-10-CM

## 2019-10-06 DIAGNOSIS — J40 BRONCHITIS: Primary | ICD-10-CM

## 2019-10-06 LAB
DEPRECATED S PYO AG THROAT QL EIA: NEGATIVE
INFLUENZA A, MOLECULAR: NEGATIVE
INFLUENZA B, MOLECULAR: NEGATIVE
SPECIMEN SOURCE: NORMAL

## 2019-10-06 PROCEDURE — 87880 STREP A ASSAY W/OPTIC: CPT

## 2019-10-06 PROCEDURE — 87502 INFLUENZA DNA AMP PROBE: CPT

## 2019-10-06 PROCEDURE — 87081 CULTURE SCREEN ONLY: CPT

## 2019-10-06 PROCEDURE — 99284 EMERGENCY DEPT VISIT MOD MDM: CPT | Mod: 25

## 2019-10-06 RX ORDER — BENZONATATE 100 MG/1
200 CAPSULE ORAL 3 TIMES DAILY PRN
Qty: 20 CAPSULE | Refills: 0 | Status: SHIPPED | OUTPATIENT
Start: 2019-10-06 | End: 2019-10-16

## 2019-10-06 RX ORDER — AZITHROMYCIN 250 MG/1
250 TABLET, FILM COATED ORAL DAILY
Qty: 6 TABLET | Refills: 0 | Status: SHIPPED | OUTPATIENT
Start: 2019-10-06 | End: 2020-06-10

## 2019-10-06 NOTE — ED TRIAGE NOTES
49 y.o. female presents to ER ED 02/ED 02B   Chief Complaint   Patient presents with    Sore Throat   .   C/o sore throat and coughing up mucus for a few days

## 2019-10-06 NOTE — ED PROVIDER NOTES
Encounter Date: 10/6/2019       History     Chief Complaint   Patient presents with    Sore Throat     Patient reports 3 days of sore throat and productive cough, headache and congestion.         Review of patient's allergies indicates:  No Known Allergies  Past Medical History:   Diagnosis Date    SZUIE (acute kidney injury) 12/2016    after first gastric sleeve  had to be dialyzed    Bariatric surgery status     Gastric sleeve    Chronic anxiety     Degenerative scoliosis in adult patient     Hyperlipidemia     Hypertension     Hypothyroidism     JANETTE (iron deficiency anemia)     Inflammatory osteoarthritis     OCD (obsessive compulsive disorder)     MICHELINE on CPAP     3cm     PCOS (polycystic ovarian syndrome)     Pleuritic chest pain     Prediabetes     Rectocele     Traumatic perforation of esophagus 11 '15    During original Gastric Sleeve surgery     Past Surgical History:   Procedure Laterality Date    BARIATRIC SURGERY  12/2016    Dr Estevez  Gastric sleeve    [facility]    EPIDURAL STEROID INJECTION INTO LUMBAR SPINE  2016    [place]    KNEE ARTHROSCOPY W/ MENISCECTOMY Right 11/2014    ANAND Jenkins osteochondroma    LAPAROSCOPIC REVISION OF NOEMI-EN-Y GASTROENTEROSTOMY N/A 11/8/2018    Procedure: REVISION, GASTROENTEROSTOMY, NOEMI-EN-Y, LAPAROSCOPIC;  Surgeon: Vitaliy Hoskins Jr., MD;  Location: [facility] OR [address] [place] [address];  Service: General;  Laterality: N/A;    TONSILLECTOMY      as child    TOTAL ABDOMINAL HYSTERECTOMY W/ BILATERAL SALPINGOOPHORECTOMY  2013    [facility]    TUBAL LIGATION       Family History   Problem Relation Age of Onset    Hypertension Mother     Rheum arthritis Mother     Mental illness Father     COPD Father     Lung cancer Father         mesothelioma    No Known Problems Sister     No Known Problems Brother     Katie Parkinson White syndrome Grandchild      Social History     Tobacco Use    Smoking status: Former Smoker     Packs/day: 0.50      Years: 15.00     Pack years: 7.50     Types: Cigarettes     Start date:      Last attempt to quit: 2018     Years since quittin.2    Smokeless tobacco: Never Used   Substance Use Topics    Alcohol use: No    Drug use: Yes     Types: Hydrocodone     Review of Systems   Constitutional: Positive for chills. Negative for fever.   HENT: Positive for congestion and sore throat.    Eyes: Negative.    Respiratory: Positive for cough. Negative for shortness of breath.         Productive of green mucous   Cardiovascular: Negative for chest pain.   Gastrointestinal: Negative for abdominal pain, diarrhea, nausea and vomiting.   Musculoskeletal: Negative for back pain and neck pain.   Skin: Negative for rash and wound.   Neurological: Positive for headaches. Negative for weakness.       Physical Exam     Initial Vitals [10/06/19 0834]   BP Pulse Resp Temp SpO2   131/80 92 20 97.8 °F (36.6 °C) 99 %      MAP       --         Physical Exam    Nursing note and vitals reviewed.  Constitutional: She appears well-developed and well-nourished. She is not diaphoretic. No distress.   HENT:   Right Ear: External ear normal.   Left Ear: External ear normal.   Mouth/Throat: Oropharynx is clear and moist.   Throat erythema, no exudates   Eyes: Conjunctivae and EOM are normal. Right eye exhibits no discharge. Left eye exhibits no discharge.   Neck: Normal range of motion.   Cardiovascular: Normal rate, regular rhythm and normal heart sounds.   No murmur heard.  Pulmonary/Chest: Breath sounds normal. She has no wheezes. She exhibits no tenderness.   Abdominal: Soft. Bowel sounds are normal. She exhibits no distension. There is no tenderness.   Musculoskeletal: Normal range of motion. She exhibits no tenderness.   Neurological: She is alert and oriented to person, place, and time. She has normal strength. GCS score is 15. GCS eye subscore is 4. GCS verbal subscore is 5. GCS motor subscore is 6.   Skin: Skin is warm and dry. No  rash noted.         ED Course   Procedures  Labs Reviewed   INFLUENZA A & B BY MOLECULAR   THROAT SCREEN, RAPID          Imaging Results          X-Ray Chest PA And Lateral (In process)                  Medical Decision Making:   Clinical Tests:   Lab Tests: Ordered and Reviewed       <> Summary of Lab: Flu and strep negative                      Clinical Impression:       ICD-10-CM ICD-9-CM   1. Bronchitis J40 490   2. Cough R05 786.2                                Halley Dhillon MD  10/06/19 1214

## 2019-10-08 LAB — BACTERIA THROAT CULT: NORMAL

## 2019-10-26 NOTE — NURSING
Pt arrived to the unit via stretcher. Ambulated to the bathroom to void upon arrival. Incentive spirometer teaching done and demonstrated. 1000 mls. Safety measures in place.    26-Oct-2019 12:58

## 2019-10-28 ENCOUNTER — HOSPITAL ENCOUNTER (OUTPATIENT)
Dept: RADIOLOGY | Facility: HOSPITAL | Age: 49
Discharge: HOME | End: 2019-10-28
Attending: NURSE PRACTITIONER
Payer: MEDICARE

## 2019-10-28 DIAGNOSIS — J20.9 ACUTE BRONCHITIS: ICD-10-CM

## 2019-10-28 DIAGNOSIS — J20.9 ACUTE BRONCHITIS: Primary | ICD-10-CM

## 2019-10-28 PROCEDURE — 71046 X-RAY EXAM CHEST 2 VIEWS: CPT | Mod: 26,,, | Performed by: RADIOLOGY

## 2019-10-28 PROCEDURE — 71046 X-RAY EXAM CHEST 2 VIEWS: CPT | Mod: TC

## 2019-10-28 PROCEDURE — 71046 XR CHEST PA AND LATERAL: ICD-10-PCS | Mod: 26,,, | Performed by: RADIOLOGY

## 2019-11-27 PROBLEM — G43.909 MIGRAINE WITHOUT STATUS MIGRAINOSUS, NOT INTRACTABLE: Status: ACTIVE | Noted: 2019-11-27

## 2019-11-27 PROBLEM — M62.838 MUSCLE SPASM: Status: ACTIVE | Noted: 2019-11-27

## 2019-11-27 PROBLEM — G47.00 INSOMNIA: Status: ACTIVE | Noted: 2019-11-27

## 2019-11-27 PROBLEM — Z79.891 ADMISSION FOR LONG-TERM OPIATE ANALGESIC USE: Status: ACTIVE | Noted: 2019-11-27

## 2019-11-27 PROBLEM — I10 ESSENTIAL HYPERTENSION, BENIGN: Status: ACTIVE | Noted: 2019-11-27

## 2020-04-17 ENCOUNTER — PATIENT MESSAGE (OUTPATIENT)
Dept: BARIATRICS | Facility: CLINIC | Age: 50
End: 2020-04-17

## 2020-10-10 ENCOUNTER — HOSPITAL ENCOUNTER (EMERGENCY)
Facility: HOSPITAL | Age: 50
Discharge: HOME | End: 2020-10-10
Attending: EMERGENCY MEDICINE
Payer: MEDICARE

## 2020-10-10 VITALS
TEMPERATURE: 97 F | BODY MASS INDEX: 39.1 KG/M2 | HEIGHT: 68 IN | WEIGHT: 258 LBS | DIASTOLIC BLOOD PRESSURE: 67 MMHG | RESPIRATION RATE: 17 BRPM | OXYGEN SATURATION: 97 % | HEART RATE: 91 BPM | SYSTOLIC BLOOD PRESSURE: 124 MMHG

## 2020-10-10 DIAGNOSIS — R06.02 SOB (SHORTNESS OF BREATH): ICD-10-CM

## 2020-10-10 DIAGNOSIS — F41.0 PANIC ATTACK: ICD-10-CM

## 2020-10-10 DIAGNOSIS — F41.9 ANXIETY: ICD-10-CM

## 2020-10-10 DIAGNOSIS — R06.02 SHORTNESS OF BREATH: ICD-10-CM

## 2020-10-10 DIAGNOSIS — F41.1 ANXIETY REACTION: Primary | ICD-10-CM

## 2020-10-10 PROCEDURE — 93005 ELECTROCARDIOGRAM TRACING: CPT

## 2020-10-10 PROCEDURE — 25000003 PHARM REV CODE 250: Performed by: EMERGENCY MEDICINE

## 2020-10-10 PROCEDURE — 93010 ELECTROCARDIOGRAM REPORT: CPT | Mod: ,,, | Performed by: INTERNAL MEDICINE

## 2020-10-10 PROCEDURE — 99283 EMERGENCY DEPT VISIT LOW MDM: CPT | Mod: 25

## 2020-10-10 PROCEDURE — 93010 EKG 12-LEAD: ICD-10-PCS | Mod: ,,, | Performed by: INTERNAL MEDICINE

## 2020-10-10 RX ORDER — ALPRAZOLAM 0.5 MG/1
0.5 TABLET ORAL
Status: DISCONTINUED | OUTPATIENT
Start: 2020-10-10 | End: 2020-10-10

## 2020-10-10 RX ORDER — ALPRAZOLAM 0.5 MG/1
0.5 TABLET ORAL 2 TIMES DAILY PRN
Qty: 10 TABLET | Refills: 0 | Status: SHIPPED | OUTPATIENT
Start: 2020-10-10 | End: 2020-11-04 | Stop reason: ALTCHOICE

## 2020-10-10 RX ORDER — ALPRAZOLAM 0.5 MG/1
0.5 TABLET ORAL
Status: COMPLETED | OUTPATIENT
Start: 2020-10-10 | End: 2020-10-10

## 2020-10-10 RX ADMIN — ALPRAZOLAM 0.5 MG: 0.5 TABLET ORAL at 12:10

## 2020-10-10 NOTE — ED NOTES
Discharge instruction and Rx given.   Patient verbalized understanding.  Discharge home in stable condition.  Ambulated out of ER with steady gait.  No acute distress.

## 2020-10-10 NOTE — ED PROVIDER NOTES
Encounter Date: 10/10/2020       History     Chief Complaint   Patient presents with    Anxiety     Patient arrived with EMS from home for anxiety. Panic attack. Patient report not sleeping for 2 days. At home treatment 2 Tylenol PM and liquid THC.     Patient has a history of chronic anxiety and panic attacks in the past.  Lately she has not been sleeping for 1 and half weeks.  No swelling in the legs no shortness of breath at present.  However she has not been sleeping for arm a week and a half not sleeping well at all.  Her primary doctor gave her THC drops.  In today because she could not sleep she decided to take an extra dose of her THC drops.  Shortly after taking her dose she felt that an anxiety attack was occurring.  During that time she had 2 min of questionable chest pain and shortness of breath.  She states this is exactly like her panic attacks.    She also had a knee surgery in March and prior to that she had a stress test which she states was normal.    She has no calf or leg swelling.  She is feeling better now.        Review of patient's allergies indicates:  No Known Allergies  Past Medical History:   Diagnosis Date    SUZIE (acute kidney injury) 12/2016    after first gastric sleeve  had to be dialyzed    Bariatric surgery status     Gastric sleeve    BMI 45.0-49.9, adult     Bronchitis, acute     Chronic anxiety     COPD (chronic obstructive pulmonary disease)     Degenerative scoliosis in adult patient     Essential (primary) hypertension     Hyperlipidemia     Hypertension     Hypothyroidism     JANETTE (iron deficiency anemia)     Inflammatory osteoarthritis     Insomnia     Migraine     Muscle spasm     OCD (obsessive compulsive disorder)     MICHELINE on CPAP     3cm     PCOS (polycystic ovarian syndrome)     Pleuritic chest pain     Prediabetes     Rectocele     Traumatic perforation of esophagus 11 '15    During original Gastric Sleeve surgery     Past Surgical History:    Procedure Laterality Date    BARIATRIC SURGERY  2016    Dr Estevez  Gastric sleeve    BLADDER SUSPENSION      [place]    EPIDURAL STEROID INJECTION INTO LUMBAR SPINE      [place]    KNEE ARTHROSCOPY W/ MENISCECTOMY Right 2014    [facility] Jenkins osteochondroma    LAPAROSCOPIC REVISION OF NOEMI-EN-Y GASTROENTEROSTOMY N/A 2018    Procedure: REVISION, GASTROENTEROSTOMY, NOEMI-EN-Y, LAPAROSCOPIC;  Surgeon: Vitaliy Hoskins Jr., MD;  Location: [facility] OR [address] [place] [address];  Service: General;  Laterality: N/A;    TONSILLECTOMY      as child    TOTAL ABDOMINAL HYSTERECTOMY W/ BILATERAL SALPINGOOPHORECTOMY      Good    TUBAL LIGATION       Family History   Problem Relation Age of Onset    Hypertension Mother     Rheum arthritis Mother     Mental illness Father     COPD Father     Lung cancer Father         mesothelioma    No Known Problems Sister     No Known Problems Brother     Katie Parkinson White syndrome Grandchild      Social History     Tobacco Use    Smoking status: Light Tobacco Smoker     Packs/day: 0.50     Years: 15.00     Pack years: 7.50     Types: Cigarettes     Start date:      Last attempt to quit: 2018     Years since quittin.2    Smokeless tobacco: Never Used   Substance Use Topics    Alcohol use: No    Drug use: Yes     Types: Hydrocodone     Review of Systems   Constitutional: Negative for fever.   HENT: Negative for sore throat.    Respiratory: Positive for shortness of breath.    Cardiovascular: Negative for chest pain.   Gastrointestinal: Negative for nausea.   Genitourinary: Negative for dysuria.   Musculoskeletal: Negative for back pain.   Skin: Negative for rash.   Neurological: Negative for weakness.   Hematological: Does not bruise/bleed easily.   Psychiatric/Behavioral: The patient is nervous/anxious.        Physical Exam     Initial Vitals [10/10/20 0007]   BP Pulse Resp Temp SpO2   124/67 91 17 97.4 °F (36.3 °C) 97 %      MAP       --         Physical  "Exam    Vitals reviewed.  Constitutional: She appears well-developed.   HENT:   Head: Normocephalic.   Eyes: Pupils are equal, round, and reactive to light.   Neck: Normal range of motion.   Cardiovascular: Normal rate.   Pulmonary/Chest: Breath sounds normal.   Abdominal: Soft.   Neurological: She is alert.   Skin: Capillary refill takes less than 2 seconds.   Psychiatric:   Anxious , tearful at times          ED Course   Procedures  Labs Reviewed - No data to display  EKG Readings: (Independently Interpreted)   Initial Reading: No STEMI. Rhythm: Normal Sinus Rhythm. Ectopy: No Ectopy. Conduction: Normal.   NS ST and T abnormality   Hr 87 interval normal            Imaging Results    None          Medical Decision Making:   Differential Diagnosis:   Possible medication side effect.  Possible chronic anxiety with stress reaction.  Unlikely blood clot unlikely cardiac.  Patient states this is exactly like her panic attacks.  Symptoms resolved at time of presentation.  EKG shows no acute ischemia.  Discussed with her at length and with shared decision making we decided upon a benzo Xanax and will follow up with  and if the symptoms return she will come back to the emergency room  Independently Interpreted Test(s):   I have ordered and independently interpreted EKG Reading(s) - see summary below       <> Summary of EKG Reading(s): No evidence of acute ischemia and no actue changes from previous in June of this year                    ED Course as of Oct 10 0033   Sat Oct 10, 2020   0017 Patient states she has not been sleeping well for the last week and a half and has seen her primary doctor who gave her medical marijuana after taking an extra dose because she could not sleep she said she felt short of breath and is feeling fine now this lasted about 3-5 minutes with a questionable chest pain.  No sweating no associated symptoms except she felt warm.  She has no complaints at all now and she states "she knows this " "is her anxiety only and ".  Patient is here only for medication to help her sleep and wants no further workup.  EKG shows no signs of ischemia.  Will treat with a benzo and have her follow-up with the primary doctor.    [AB]      ED Course User Index  [AB] Rufino Winchester MD            Clinical Impression:       ICD-10-CM ICD-9-CM   1. Anxiety reaction  F41.1 300.00   2. Anxiety  F41.9 300.00   3. Panic attack  F41.0 300.01   4. SOB (shortness of breath)  R06.02 786.05   5. Shortness of breath  R06.02 786.05                      Disposition:   Disposition: Discharged  Condition: Stable     ED Disposition Condition    Discharge Stable        ED Prescriptions     Medication Sig Dispense Start Date End Date Auth. Provider    ALPRAZolam (XANAX) 0.5 MG tablet Take 1 tablet (0.5 mg total) by mouth 2 (two) times daily as needed for Anxiety. 10 tablet 10/10/2020 10/15/2020 Rufino Winchester MD        Follow-up Information     Follow up With Specialties Details Why Contact Info    Lewis Page MD Internal Medicine In 2 days  [address] [facility] [address]  [phone]                                         Rufino Winchester MD  10/10/20 0039       Rufino Winchester MD  10/10/20 0049    "

## 2020-10-12 ENCOUNTER — PATIENT OUTREACH (OUTPATIENT)
Dept: EMERGENCY MEDICINE | Facility: HOSPITAL | Age: 50
End: 2020-10-12

## 2020-10-16 NOTE — PROGRESS NOTES
Attempted to contact patient on 3 separate occasions, patient is unable to reach. ED Navigator to close encounter at this time.    Terrell Sr  ED Navigator  [phone]

## 2020-11-04 PROBLEM — Z79.891 ADMISSION FOR LONG-TERM OPIATE ANALGESIC USE: Status: RESOLVED | Noted: 2019-11-27 | Resolved: 2020-11-04

## 2020-11-04 PROBLEM — F43.0 ACUTE STRESS REACTION: Status: ACTIVE | Noted: 2020-11-04

## 2020-11-04 PROBLEM — K22.4 ESOPHAGEAL SPASM: Status: ACTIVE | Noted: 2018-02-15

## 2020-11-04 PROBLEM — E66.01 MORBID OBESITY: Chronic | Status: RESOLVED | Noted: 2018-10-24 | Resolved: 2020-11-04

## 2020-11-04 PROBLEM — I10 ESSENTIAL HYPERTENSION, BENIGN: Status: RESOLVED | Noted: 2019-11-27 | Resolved: 2020-11-04

## 2020-11-04 PROBLEM — F11.90 CHRONIC, CONTINUOUS USE OF OPIOIDS: Status: RESOLVED | Noted: 2019-06-19 | Resolved: 2020-11-04

## 2020-11-04 PROBLEM — M62.838 MUSCLE SPASM: Status: RESOLVED | Noted: 2019-11-27 | Resolved: 2020-11-04

## 2020-11-04 PROBLEM — F43.10 PTSD (POST-TRAUMATIC STRESS DISORDER): Status: ACTIVE | Noted: 2020-11-04

## 2020-11-04 PROBLEM — G47.00 INSOMNIA: Status: RESOLVED | Noted: 2019-11-27 | Resolved: 2020-11-04

## 2020-11-04 PROBLEM — G44.209 MIXED COMMON MIGRAINE AND MUSCLE CONTRACTION HEADACHE: Status: ACTIVE | Noted: 2020-11-04

## 2020-11-04 PROBLEM — G43.009 MIXED COMMON MIGRAINE AND MUSCLE CONTRACTION HEADACHE: Status: ACTIVE | Noted: 2020-11-04

## 2020-11-18 ENCOUNTER — TELEPHONE (OUTPATIENT)
Dept: FAMILY MEDICINE | Facility: CLINIC | Age: 50
End: 2020-11-18

## 2020-11-18 DIAGNOSIS — F51.02 ADJUSTMENT INSOMNIA: ICD-10-CM

## 2020-11-18 DIAGNOSIS — F42.2 MIXED OBSESSIONAL THOUGHTS AND ACTS: ICD-10-CM

## 2020-11-18 DIAGNOSIS — F43.0 ACUTE STRESS REACTION: ICD-10-CM

## 2020-11-18 DIAGNOSIS — F41.9 CHRONIC ANXIETY: ICD-10-CM

## 2020-11-18 DIAGNOSIS — F43.10 PTSD (POST-TRAUMATIC STRESS DISORDER): ICD-10-CM

## 2020-11-18 RX ORDER — ALPRAZOLAM 0.5 MG/1
0.5 TABLET ORAL 3 TIMES DAILY PRN
Qty: 90 TABLET | Refills: 5 | Status: SHIPPED | OUTPATIENT
Start: 2020-11-18 | End: 2020-12-07 | Stop reason: DRUGHIGH

## 2020-11-18 NOTE — TELEPHONE ENCOUNTER
Pt states the xanax was working for a little while and now it only help or a couple hours then it comes back. She is taking it 2 times daily but in between the anxiety comes back. She would like to know if anything can be done prior to her next visit.

## 2020-12-07 PROBLEM — F32.9 MAJOR DEPRESSION, SINGLE EPISODE: Status: ACTIVE | Noted: 2020-12-07

## 2020-12-07 PROBLEM — G90.3: Status: ACTIVE | Noted: 2020-12-07

## 2020-12-07 PROBLEM — N11.9 CHRONIC INTERSTITIAL NEPHRITIS: Status: ACTIVE | Noted: 2020-12-07

## 2021-02-19 ENCOUNTER — OFFICE VISIT (OUTPATIENT)
Dept: OBSTETRICS AND GYNECOLOGY | Facility: CLINIC | Age: 51
End: 2021-02-19
Payer: MEDICARE

## 2021-02-19 VITALS
HEART RATE: 84 BPM | DIASTOLIC BLOOD PRESSURE: 72 MMHG | HEIGHT: 68 IN | BODY MASS INDEX: 37.13 KG/M2 | RESPIRATION RATE: 18 BRPM | SYSTOLIC BLOOD PRESSURE: 112 MMHG | WEIGHT: 245 LBS

## 2021-02-19 DIAGNOSIS — Z12.31 ENCOUNTER FOR SCREENING MAMMOGRAM FOR BREAST CANCER: Primary | ICD-10-CM

## 2021-02-19 DIAGNOSIS — Z79.890 HORMONE REPLACEMENT THERAPY (HRT): ICD-10-CM

## 2021-02-19 DIAGNOSIS — Z01.419 WELL WOMAN EXAM WITH ROUTINE GYNECOLOGICAL EXAM: ICD-10-CM

## 2021-02-19 PROCEDURE — 99999 PR PBB SHADOW E&M-EST. PATIENT-LVL III: ICD-10-PCS | Mod: PBBFAC,,, | Performed by: ADVANCED PRACTICE MIDWIFE

## 2021-02-19 PROCEDURE — 99386 PREV VISIT NEW AGE 40-64: CPT | Mod: S$PBB | Performed by: ADVANCED PRACTICE MIDWIFE

## 2021-02-19 PROCEDURE — 99213 OFFICE O/P EST LOW 20 MIN: CPT | Mod: PBBFAC | Performed by: ADVANCED PRACTICE MIDWIFE

## 2021-02-19 PROCEDURE — 99999 PR STA SHADOW: CPT | Mod: PBBFAC,,, | Performed by: ADVANCED PRACTICE MIDWIFE

## 2021-02-19 PROCEDURE — 99999 PR PBB SHADOW E&M-EST. PATIENT-LVL III: CPT | Mod: PBBFAC,,, | Performed by: ADVANCED PRACTICE MIDWIFE

## 2021-02-19 RX ORDER — ESTRADIOL 1 MG/1
1 TABLET ORAL DAILY
Qty: 30 TABLET | Refills: 11 | Status: SHIPPED | OUTPATIENT
Start: 2021-02-19 | End: 2021-11-30 | Stop reason: SDUPTHER

## 2021-04-09 ENCOUNTER — HOSPITAL ENCOUNTER (OUTPATIENT)
Dept: RADIOLOGY | Facility: HOSPITAL | Age: 51
Discharge: HOME | End: 2021-04-09
Attending: ADVANCED PRACTICE MIDWIFE
Payer: MEDICARE

## 2021-04-09 VITALS — WEIGHT: 240 LBS | HEIGHT: 68 IN | BODY MASS INDEX: 36.37 KG/M2

## 2021-04-09 DIAGNOSIS — Z12.31 ENCOUNTER FOR SCREENING MAMMOGRAM FOR BREAST CANCER: ICD-10-CM

## 2021-04-09 PROCEDURE — 77067 MAMMO DIGITAL SCREENING BILAT WITH TOMO: ICD-10-PCS | Mod: 26,,, | Performed by: RADIOLOGY

## 2021-04-09 PROCEDURE — 77063 MAMMO DIGITAL SCREENING BILAT WITH TOMO: ICD-10-PCS | Mod: 26,,, | Performed by: RADIOLOGY

## 2021-04-09 PROCEDURE — 77067 SCR MAMMO BI INCL CAD: CPT | Mod: 26,,, | Performed by: RADIOLOGY

## 2021-04-09 PROCEDURE — 77067 SCR MAMMO BI INCL CAD: CPT | Mod: TC

## 2021-04-09 PROCEDURE — 77063 BREAST TOMOSYNTHESIS BI: CPT | Mod: 26,,, | Performed by: RADIOLOGY

## 2021-04-20 PROBLEM — D57.3 SICKLE-CELL TRAIT: Status: ACTIVE | Noted: 2021-04-20

## 2021-04-20 PROBLEM — M06.9 RHEUMATOID ARTHRITIS: Status: ACTIVE | Noted: 2021-04-20

## 2021-08-22 ENCOUNTER — HOSPITAL ENCOUNTER (EMERGENCY)
Facility: HOSPITAL | Age: 51
Discharge: HOME | End: 2021-08-22
Payer: MEDICAID

## 2021-08-22 VITALS
OXYGEN SATURATION: 96 % | WEIGHT: 252.31 LBS | DIASTOLIC BLOOD PRESSURE: 88 MMHG | SYSTOLIC BLOOD PRESSURE: 136 MMHG | RESPIRATION RATE: 19 BRPM | HEART RATE: 90 BPM | TEMPERATURE: 98 F | BODY MASS INDEX: 38.36 KG/M2

## 2021-08-22 DIAGNOSIS — Z20.822 CLOSE EXPOSURE TO COVID-19 VIRUS: Primary | ICD-10-CM

## 2021-08-22 PROCEDURE — 99284 EMERGENCY DEPT VISIT MOD MDM: CPT

## 2021-08-22 PROCEDURE — U0003 INFECTIOUS AGENT DETECTION BY NUCLEIC ACID (DNA OR RNA); SEVERE ACUTE RESPIRATORY SYNDROME CORONAVIRUS 2 (SARS-COV-2) (CORONAVIRUS DISEASE [COVID-19]), AMPLIFIED PROBE TECHNIQUE, MAKING USE OF HIGH THROUGHPUT TECHNOLOGIES AS DESCRIBED BY CMS-2020-01-R: HCPCS | Performed by: SURGERY

## 2021-08-22 PROCEDURE — U0005 INFEC AGEN DETEC AMPLI PROBE: HCPCS | Performed by: SURGERY

## 2021-08-22 RX ORDER — BENZONATATE 100 MG/1
200 CAPSULE ORAL 3 TIMES DAILY PRN
Qty: 20 CAPSULE | Refills: 0 | Status: SHIPPED | OUTPATIENT
Start: 2021-08-22 | End: 2021-09-01

## 2021-08-22 RX ORDER — CETIRIZINE HYDROCHLORIDE 10 MG/1
10 TABLET ORAL DAILY
Qty: 30 TABLET | Refills: 0 | Status: SHIPPED | OUTPATIENT
Start: 2021-08-22 | End: 2021-12-29 | Stop reason: SDUPTHER

## 2021-08-23 ENCOUNTER — OUTPATIENT CASE MANAGEMENT (OUTPATIENT)
Dept: ADMINISTRATIVE | Facility: OTHER | Age: 51
End: 2021-08-23

## 2021-08-23 LAB
SARS-COV-2 RNA RESP QL NAA+PROBE: NOT DETECTED
SARS-COV-2- CYCLE NUMBER: NORMAL

## 2021-08-27 ENCOUNTER — OUTPATIENT CASE MANAGEMENT (OUTPATIENT)
Dept: ADMINISTRATIVE | Facility: OTHER | Age: 51
End: 2021-08-27

## 2021-09-08 ENCOUNTER — OUTPATIENT CASE MANAGEMENT (OUTPATIENT)
Dept: ADMINISTRATIVE | Facility: OTHER | Age: 51
End: 2021-09-08

## 2021-09-15 ENCOUNTER — OUTPATIENT CASE MANAGEMENT (OUTPATIENT)
Dept: ADMINISTRATIVE | Facility: OTHER | Age: 51
End: 2021-09-15

## 2021-10-01 ENCOUNTER — OUTPATIENT CASE MANAGEMENT (OUTPATIENT)
Dept: ADMINISTRATIVE | Facility: OTHER | Age: 51
End: 2021-10-01

## 2021-10-22 ENCOUNTER — OUTPATIENT CASE MANAGEMENT (OUTPATIENT)
Dept: ADMINISTRATIVE | Facility: OTHER | Age: 51
End: 2021-10-22

## 2022-01-24 DIAGNOSIS — U07.1 COVID-19 VIRUS DETECTED: ICD-10-CM

## 2022-04-04 ENCOUNTER — TELEPHONE (OUTPATIENT)
Dept: BARIATRICS | Facility: CLINIC | Age: 52
End: 2022-04-04
Payer: MEDICARE

## 2022-09-22 PROBLEM — Z01.419 WELL WOMAN EXAM WITH ROUTINE GYNECOLOGICAL EXAM: Status: RESOLVED | Noted: 2021-02-19 | Resolved: 2022-09-22

## 2022-09-22 PROBLEM — U07.1 COVID-19: Status: RESOLVED | Noted: 2022-01-24 | Resolved: 2022-09-22

## 2022-09-22 PROBLEM — Z71.3 DIETARY COUNSELING: Status: RESOLVED | Noted: 2019-06-19 | Resolved: 2022-09-22

## 2022-11-28 ENCOUNTER — HOSPITAL ENCOUNTER (EMERGENCY)
Facility: HOSPITAL | Age: 52
Discharge: HOME | End: 2022-11-28
Attending: EMERGENCY MEDICINE
Payer: MEDICAID

## 2022-11-28 VITALS
OXYGEN SATURATION: 99 % | HEART RATE: 82 BPM | TEMPERATURE: 98 F | RESPIRATION RATE: 18 BRPM | DIASTOLIC BLOOD PRESSURE: 75 MMHG | SYSTOLIC BLOOD PRESSURE: 130 MMHG

## 2022-11-28 DIAGNOSIS — R11.2 NAUSEA AND VOMITING, UNSPECIFIED VOMITING TYPE: Primary | ICD-10-CM

## 2022-11-28 LAB
ALBUMIN SERPL BCP-MCNC: 3.6 G/DL (ref 3.5–5.2)
ALP SERPL-CCNC: 71 U/L (ref 55–135)
ALT SERPL W/O P-5'-P-CCNC: 20 U/L (ref 10–44)
ANION GAP SERPL CALC-SCNC: 15 MMOL/L (ref 8–16)
AST SERPL-CCNC: 17 U/L (ref 10–40)
BASOPHILS # BLD AUTO: 0.05 K/UL (ref 0–0.2)
BASOPHILS NFR BLD: 0.6 % (ref 0–1.9)
BILIRUB SERPL-MCNC: 0.5 MG/DL (ref 0.1–1)
BUN SERPL-MCNC: 11 MG/DL (ref 6–20)
CALCIUM SERPL-MCNC: 9.8 MG/DL (ref 8.7–10.5)
CHLORIDE SERPL-SCNC: 106 MMOL/L (ref 95–110)
CO2 SERPL-SCNC: 24 MMOL/L (ref 23–29)
CREAT SERPL-MCNC: 0.8 MG/DL (ref 0.5–1.4)
DIFFERENTIAL METHOD: ABNORMAL
EOSINOPHIL # BLD AUTO: 0.2 K/UL (ref 0–0.5)
EOSINOPHIL NFR BLD: 2.7 % (ref 0–8)
ERYTHROCYTE [DISTWIDTH] IN BLOOD BY AUTOMATED COUNT: 13.6 % (ref 11.5–14.5)
EST. GFR  (NO RACE VARIABLE): >60 ML/MIN/1.73 M^2
GLUCOSE SERPL-MCNC: 94 MG/DL (ref 70–110)
HCT VFR BLD AUTO: 38 % (ref 37–48.5)
HGB BLD-MCNC: 13.1 G/DL (ref 12–16)
IMM GRANULOCYTES # BLD AUTO: 0.01 K/UL (ref 0–0.04)
IMM GRANULOCYTES NFR BLD AUTO: 0.1 % (ref 0–0.5)
LIPASE SERPL-CCNC: 27 U/L (ref 4–60)
LYMPHOCYTES # BLD AUTO: 2.8 K/UL (ref 1–4.8)
LYMPHOCYTES NFR BLD: 35.9 % (ref 18–48)
MCH RBC QN AUTO: 26.8 PG (ref 27–31)
MCHC RBC AUTO-ENTMCNC: 34.5 G/DL (ref 32–36)
MCV RBC AUTO: 78 FL (ref 82–98)
MONOCYTES # BLD AUTO: 0.4 K/UL (ref 0.3–1)
MONOCYTES NFR BLD: 5.1 % (ref 4–15)
NEUTROPHILS # BLD AUTO: 4.3 K/UL (ref 1.8–7.7)
NEUTROPHILS NFR BLD: 55.6 % (ref 38–73)
NRBC BLD-RTO: 0 /100 WBC
PLATELET # BLD AUTO: 304 K/UL (ref 150–450)
PMV BLD AUTO: 9.4 FL (ref 9.2–12.9)
POTASSIUM SERPL-SCNC: 3.5 MMOL/L (ref 3.5–5.1)
PROT SERPL-MCNC: 7.2 G/DL (ref 6–8.4)
RBC # BLD AUTO: 4.88 M/UL (ref 4–5.4)
SODIUM SERPL-SCNC: 145 MMOL/L (ref 136–145)
WBC # BLD AUTO: 7.82 K/UL (ref 3.9–12.7)

## 2022-11-28 PROCEDURE — 25000003 PHARM REV CODE 250: Performed by: EMERGENCY MEDICINE

## 2022-11-28 PROCEDURE — 85025 COMPLETE CBC W/AUTO DIFF WBC: CPT | Performed by: EMERGENCY MEDICINE

## 2022-11-28 PROCEDURE — 63600175 PHARM REV CODE 636 W HCPCS: Performed by: EMERGENCY MEDICINE

## 2022-11-28 PROCEDURE — 80053 COMPREHEN METABOLIC PANEL: CPT | Performed by: EMERGENCY MEDICINE

## 2022-11-28 PROCEDURE — 99284 EMERGENCY DEPT VISIT MOD MDM: CPT | Mod: 25

## 2022-11-28 PROCEDURE — 96374 THER/PROPH/DIAG INJ IV PUSH: CPT

## 2022-11-28 PROCEDURE — 83690 ASSAY OF LIPASE: CPT | Performed by: EMERGENCY MEDICINE

## 2022-11-28 PROCEDURE — 96375 TX/PRO/DX INJ NEW DRUG ADDON: CPT

## 2022-11-28 RX ORDER — FAMOTIDINE 20 MG/1
20 TABLET, FILM COATED ORAL 2 TIMES DAILY
Qty: 20 TABLET | Refills: 0 | Status: SHIPPED | OUTPATIENT
Start: 2022-11-28 | End: 2023-03-30

## 2022-11-28 RX ORDER — ONDANSETRON 4 MG/1
4 TABLET, FILM COATED ORAL EVERY 6 HOURS PRN
Qty: 12 TABLET | Refills: 0 | Status: SHIPPED | OUTPATIENT
Start: 2022-11-28 | End: 2023-03-30

## 2022-11-28 RX ORDER — ONDANSETRON 2 MG/ML
4 INJECTION INTRAMUSCULAR; INTRAVENOUS
Status: COMPLETED | OUTPATIENT
Start: 2022-11-28 | End: 2022-11-28

## 2022-11-28 RX ORDER — MORPHINE SULFATE 2 MG/ML
2 INJECTION, SOLUTION INTRAMUSCULAR; INTRAVENOUS
Status: COMPLETED | OUTPATIENT
Start: 2022-11-28 | End: 2022-11-28

## 2022-11-28 RX ADMIN — ONDANSETRON 4 MG: 2 INJECTION INTRAMUSCULAR; INTRAVENOUS at 10:11

## 2022-11-28 RX ADMIN — MORPHINE SULFATE 2 MG: 2 INJECTION, SOLUTION INTRAMUSCULAR; INTRAVENOUS at 10:11

## 2022-11-28 RX ADMIN — SODIUM CHLORIDE 1000 ML: 0.9 INJECTION, SOLUTION INTRAVENOUS at 10:11

## 2022-11-29 NOTE — ED TRIAGE NOTES
Patient to ER CC of constipation for for 3 days, abd pain and vomiting started three hours ago. Reports taking a fiber laxative 1 hours PTA.

## 2022-12-01 NOTE — ED PROVIDER NOTES
Encounter Date: 11/28/2022       History     Chief Complaint   Patient presents with    Abdominal Pain     Patient to ER CC of constipation for for 3 days, abd pain and vomiting started today      53 yo female with extensive pmhx as below here via POV with complaint of constipation, N/V x 3 days. No fever/chills. No known sick contacts. Gradual onset. Similar to previous. No aggravating or alleviating factors.     Review of patient's allergies indicates:  No Known Allergies  Past Medical History:   Diagnosis Date    Abnormal Pap smear of cervix     Acute stress reaction 11/4/2020    SUZIE (acute kidney injury) 12/2016    after first gastric sleeve  had to be dialyzed    Bariatric surgery status     Gastric sleeve    Chronic anxiety     COPD (chronic obstructive pulmonary disease)     COVID-19 01/24/2022    Degenerative scoliosis in adult patient     Esophageal spasm 2/15/2018    Essential (primary) hypertension     Fibromyalgia     Hyperlipidemia     Hypothyroidism     JANETTE (iron deficiency anemia)     Inflammatory osteoarthritis     Insomnia     Migraine     Mixed common migraine and muscle contraction headache 11/4/2020    Muscle spasm     OCD (obsessive compulsive disorder)     MICHELINE on CPAP     3cm     PCOS (polycystic ovarian syndrome)     Pleuritic chest pain     Prediabetes     PTSD (post-traumatic stress disorder) 11/4/2020    Rectocele     Traumatic perforation of esophagus 11/2015    During original Gastric Sleeve surgery     Past Surgical History:   Procedure Laterality Date    BARIATRIC SURGERY  12/2016    Dr Estevez  Gastric sleeve, max weight 363    BLADDER SUSPENSION  2013    [place]    EPIDURAL STEROID INJECTION INTO LUMBAR SPINE  2016    [place]    HERNIA REPAIR      HYSTERECTOMY      KNEE ARTHROSCOPY  03/04/2020    KNEE ARTHROSCOPY W/ MENISCECTOMY Right 11/2014    ANAND Jenkins osteochondroma    LAPAROSCOPIC REVISION OF NOEMI-EN-Y GASTROENTEROSTOMY N/A 11/8/2018    Procedure: REVISION, GASTROENTEROSTOMY,  NOEMI-EN-Y, LAPAROSCOPIC;  Surgeon: Vitaliy Hoskins Jr., MD;  Location: [facility] OR [address] [place] [address];  Service: General;  Laterality: N/A;    SLEEVE GASTROPLASTY      TONSILLECTOMY      as child    TOTAL ABDOMINAL HYSTERECTOMY W/ BILATERAL SALPINGOOPHORECTOMY      Good    TOTAL KNEE ARTHROPLASTY Right 2020    ANTIONE Jenkins    TUBAL LIGATION      WISDOM TOOTH EXTRACTION       Family History   Problem Relation Age of Onset    Hypertension Mother     Rheum arthritis Mother     Alcohol abuse Mother     Mental illness Father     COPD Father     Lung cancer Father         mesothelioma    No Known Problems Sister     No Known Problems Brother     Katie Parkinson White syndrome Grandchild     Mitochondrial disorder Grandchild     Breast cancer Maternal Aunt     Breast cancer Maternal Aunt     Breast cancer Maternal Aunt     Ovarian cancer Maternal Cousin     Breast cancer Paternal Aunt     Colon cancer Neg Hx      Social History     Tobacco Use    Smoking status: Some Days     Packs/day: 0.00     Years: 15.00     Pack years: 0.00     Types: Cigarettes     Start date:      Last attempt to quit: 2018     Years since quittin.4    Smokeless tobacco: Never    Tobacco comments:     hasn't smoke since    Substance Use Topics    Alcohol use: No    Drug use: Yes     Types: Hydrocodone     Review of Systems   Constitutional: Negative.    Respiratory: Negative.     Cardiovascular: Negative.    Gastrointestinal:  Positive for constipation and nausea.   All other systems reviewed and are negative.    Physical Exam     Initial Vitals [22 2155]   BP Pulse Resp Temp SpO2   (!) 158/98 100 18 96.8 °F (36 °C) 99 %      MAP       --         Physical Exam    Nursing note and vitals reviewed.  Constitutional: She appears well-developed and well-nourished. She is not diaphoretic. No distress.   HENT:   Head: Normocephalic and atraumatic.   Eyes: EOM are normal. Pupils are equal, round, and reactive to light.   Neck: Neck  supple.   Normal range of motion.  Cardiovascular:  Normal rate, regular rhythm and intact distal pulses.           Pulmonary/Chest: Breath sounds normal. No respiratory distress. She has no wheezes. She has no rales.   Abdominal: Abdomen is soft. Bowel sounds are normal. She exhibits no distension. There is no abdominal tenderness. There is no rebound.   Musculoskeletal:         General: No tenderness or edema. Normal range of motion.      Cervical back: Normal range of motion and neck supple.     Neurological: She is alert and oriented to person, place, and time. She has normal strength and normal reflexes.   Skin: Skin is warm and dry. Capillary refill takes less than 2 seconds.       ED Course   Procedures  Labs Reviewed   CBC W/ AUTO DIFFERENTIAL - Abnormal; Notable for the following components:       Result Value    MCV 78 (*)     MCH 26.8 (*)     All other components within normal limits   COMPREHENSIVE METABOLIC PANEL   LIPASE          Imaging Results              X-Ray Abdomen AP 1 View (KUB) (Final result)  Result time 11/28/22 23:46:44      Final result by Luca Kimball MD (11/28/22 23:46:44)                   Impression:      Large colonic stool burden, suggestive of constipation.      Electronically signed by: Luca Kimball MD  Date:    11/28/2022  Time:    23:46               Narrative:    EXAMINATION:  XR ABDOMEN AP 1 VIEW    CLINICAL HISTORY:  Constipation, unspecified    TECHNIQUE:  AP View(s) of the abdomen was performed.    COMPARISON:  None    FINDINGS:  The stomach is nondistended.  There is large colonic stool burden.  There is no evidence of pneumatosis.  No portal venous air is identified.  There is no gross pneumoperitoneum.    There are phleboliths in the pelvis.  The osseous structures demonstrate degenerative changes.                                    X-Rays:   Independently Interpreted Readings:   Abdomen: No air fluid levels or signs of obstruction.   Medications   sodium chloride 0.9%  bolus 1,000 mL (1,000 mLs Intravenous New Bag 11/28/22 2229)   ondansetron injection 4 mg (4 mg Intravenous Given 11/28/22 2218)   morphine injection 2 mg (2 mg Intravenous Given 11/28/22 2223)     Medical Decision Making:   Clinical Tests:   Lab Tests: Reviewed and Ordered  Radiological Study: Reviewed and Ordered  ED Management:  Tolerating PO. Pain resolved. Results, required f/u, treatment plan and return precautions discussed.                         Clinical Impression:   Final diagnoses:  [R11.2] Nausea and vomiting, unspecified vomiting type (Primary)        ED Disposition Condition    Discharge Stable          ED Prescriptions       Medication Sig Dispense Start Date End Date Auth. Provider    ondansetron (ZOFRAN) 4 MG tablet Take 1 tablet (4 mg total) by mouth every 6 (six) hours as needed for Nausea. 12 tablet 11/28/2022 -- Uvaldo Lazo MD    famotidine (PEPCID) 20 MG tablet Take 1 tablet (20 mg total) by mouth 2 (two) times daily. 20 tablet 11/28/2022 11/28/2023 Uvaldo Lazo MD          Follow-up Information       Follow up With Specialties Details Why Contact Info    Claritza Chase NP Internal Medicine Schedule an appointment as soon as possible for a visit   [address] [facility] [address]  [facility] [place] [address]  [phone]               Uvaldo Lazo MD  11/30/22 3919

## 2022-12-29 PROBLEM — F33.0 MDD (MAJOR DEPRESSIVE DISORDER), RECURRENT EPISODE, MILD: Status: ACTIVE | Noted: 2020-12-07

## 2022-12-29 PROBLEM — G43.909 MIGRAINE WITHOUT STATUS MIGRAINOSUS, NOT INTRACTABLE: Status: RESOLVED | Noted: 2019-11-27 | Resolved: 2022-12-29

## 2022-12-29 PROBLEM — Z79.899 ENCOUNTER FOR LONG-TERM (CURRENT) USE OF MEDICATIONS: Status: RESOLVED | Noted: 2019-06-19 | Resolved: 2022-12-29

## 2022-12-29 PROBLEM — Z51.81 ENCOUNTER FOR THERAPEUTIC DRUG MONITORING: Status: RESOLVED | Noted: 2019-06-19 | Resolved: 2022-12-29

## 2023-04-12 ENCOUNTER — HOSPITAL ENCOUNTER (OUTPATIENT)
Dept: RADIOLOGY | Facility: HOSPITAL | Age: 53
Discharge: HOME | End: 2023-04-12
Attending: NURSE PRACTITIONER
Payer: MEDICARE

## 2023-04-12 VITALS — WEIGHT: 220 LBS | HEIGHT: 68 IN | BODY MASS INDEX: 33.34 KG/M2

## 2023-04-12 DIAGNOSIS — Z12.31 OTHER SCREENING MAMMOGRAM: ICD-10-CM

## 2023-04-12 PROCEDURE — 77067 SCR MAMMO BI INCL CAD: CPT | Mod: TC

## 2023-04-12 PROCEDURE — 77063 MAMMO DIGITAL SCREENING BILAT WITH TOMO: ICD-10-PCS | Mod: 26,,, | Performed by: RADIOLOGY

## 2023-04-12 PROCEDURE — 77067 MAMMO DIGITAL SCREENING BILAT WITH TOMO: ICD-10-PCS | Mod: 26,,, | Performed by: RADIOLOGY

## 2023-04-12 PROCEDURE — 77067 SCR MAMMO BI INCL CAD: CPT | Mod: 26,,, | Performed by: RADIOLOGY

## 2023-04-12 PROCEDURE — 77063 BREAST TOMOSYNTHESIS BI: CPT | Mod: 26,,, | Performed by: RADIOLOGY

## 2023-06-07 ENCOUNTER — HOSPITAL ENCOUNTER (EMERGENCY)
Facility: HOSPITAL | Age: 53
Discharge: HOME | End: 2023-06-07
Attending: STUDENT IN AN ORGANIZED HEALTH CARE EDUCATION/TRAINING PROGRAM
Payer: MEDICARE

## 2023-06-07 VITALS
RESPIRATION RATE: 18 BRPM | WEIGHT: 217 LBS | SYSTOLIC BLOOD PRESSURE: 137 MMHG | OXYGEN SATURATION: 99 % | DIASTOLIC BLOOD PRESSURE: 88 MMHG | HEART RATE: 87 BPM | TEMPERATURE: 98 F | BODY MASS INDEX: 32.99 KG/M2

## 2023-06-07 DIAGNOSIS — K52.9 GASTROENTERITIS: Primary | ICD-10-CM

## 2023-06-07 DIAGNOSIS — R07.9 CHEST PAIN: ICD-10-CM

## 2023-06-07 LAB
ALBUMIN SERPL BCP-MCNC: 4.1 G/DL (ref 3.5–5.2)
ALP SERPL-CCNC: 67 U/L (ref 55–135)
ALT SERPL W/O P-5'-P-CCNC: 19 U/L (ref 10–44)
ANION GAP SERPL CALC-SCNC: 14 MMOL/L (ref 8–16)
AST SERPL-CCNC: 15 U/L (ref 10–40)
BASOPHILS # BLD AUTO: 0.06 K/UL (ref 0–0.2)
BASOPHILS NFR BLD: 0.7 % (ref 0–1.9)
BILIRUB SERPL-MCNC: 0.6 MG/DL (ref 0.1–1)
BILIRUB UR QL STRIP: NEGATIVE
BUN SERPL-MCNC: 6 MG/DL (ref 6–20)
CALCIUM SERPL-MCNC: 10 MG/DL (ref 8.7–10.5)
CHLORIDE SERPL-SCNC: 101 MMOL/L (ref 95–110)
CLARITY UR: CLEAR
CO2 SERPL-SCNC: 25 MMOL/L (ref 23–29)
COLOR UR: YELLOW
CREAT SERPL-MCNC: 0.7 MG/DL (ref 0.5–1.4)
DIFFERENTIAL METHOD: ABNORMAL
EOSINOPHIL # BLD AUTO: 0.1 K/UL (ref 0–0.5)
EOSINOPHIL NFR BLD: 1.3 % (ref 0–8)
ERYTHROCYTE [DISTWIDTH] IN BLOOD BY AUTOMATED COUNT: 14.4 % (ref 11.5–14.5)
EST. GFR  (NO RACE VARIABLE): >60 ML/MIN/1.73 M^2
GLUCOSE SERPL-MCNC: 84 MG/DL (ref 70–110)
GLUCOSE UR QL STRIP: NEGATIVE
GROUP A STREP, MOLECULAR: NEGATIVE
HCT VFR BLD AUTO: 42.1 % (ref 37–48.5)
HGB BLD-MCNC: 13.8 G/DL (ref 12–16)
HGB UR QL STRIP: NEGATIVE
IMM GRANULOCYTES # BLD AUTO: 0.03 K/UL (ref 0–0.04)
IMM GRANULOCYTES NFR BLD AUTO: 0.3 % (ref 0–0.5)
INFLUENZA A, MOLECULAR: NEGATIVE
INFLUENZA B, MOLECULAR: NEGATIVE
KETONES UR QL STRIP: NEGATIVE
LEUKOCYTE ESTERASE UR QL STRIP: NEGATIVE
LYMPHOCYTES # BLD AUTO: 2.2 K/UL (ref 1–4.8)
LYMPHOCYTES NFR BLD: 24.1 % (ref 18–48)
MCH RBC QN AUTO: 27.1 PG (ref 27–31)
MCHC RBC AUTO-ENTMCNC: 32.8 G/DL (ref 32–36)
MCV RBC AUTO: 83 FL (ref 82–98)
MONOCYTES # BLD AUTO: 0.4 K/UL (ref 0.3–1)
MONOCYTES NFR BLD: 3.9 % (ref 4–15)
NEUTROPHILS # BLD AUTO: 6.3 K/UL (ref 1.8–7.7)
NEUTROPHILS NFR BLD: 69.7 % (ref 38–73)
NITRITE UR QL STRIP: NEGATIVE
NRBC BLD-RTO: 0 /100 WBC
PH UR STRIP: 6 [PH] (ref 5–8)
PLATELET # BLD AUTO: 323 K/UL (ref 150–450)
PMV BLD AUTO: 10 FL (ref 9.2–12.9)
POTASSIUM SERPL-SCNC: 3.6 MMOL/L (ref 3.5–5.1)
PROT SERPL-MCNC: 7.9 G/DL (ref 6–8.4)
PROT UR QL STRIP: NEGATIVE
RBC # BLD AUTO: 5.09 M/UL (ref 4–5.4)
SARS-COV-2 RDRP RESP QL NAA+PROBE: NEGATIVE
SODIUM SERPL-SCNC: 140 MMOL/L (ref 136–145)
SP GR UR STRIP: <=1.005 (ref 1–1.03)
SPECIMEN SOURCE: NORMAL
TROPONIN I SERPL DL<=0.01 NG/ML-MCNC: <0.006 NG/ML (ref 0–0.03)
URN SPEC COLLECT METH UR: ABNORMAL
UROBILINOGEN UR STRIP-ACNC: NEGATIVE EU/DL
WBC # BLD AUTO: 9.07 K/UL (ref 3.9–12.7)

## 2023-06-07 PROCEDURE — 80053 COMPREHEN METABOLIC PANEL: CPT | Performed by: STUDENT IN AN ORGANIZED HEALTH CARE EDUCATION/TRAINING PROGRAM

## 2023-06-07 PROCEDURE — U0002 COVID-19 LAB TEST NON-CDC: HCPCS | Performed by: STUDENT IN AN ORGANIZED HEALTH CARE EDUCATION/TRAINING PROGRAM

## 2023-06-07 PROCEDURE — 87651 STREP A DNA AMP PROBE: CPT | Performed by: STUDENT IN AN ORGANIZED HEALTH CARE EDUCATION/TRAINING PROGRAM

## 2023-06-07 PROCEDURE — 93005 ELECTROCARDIOGRAM TRACING: CPT

## 2023-06-07 PROCEDURE — 25000003 PHARM REV CODE 250: Performed by: STUDENT IN AN ORGANIZED HEALTH CARE EDUCATION/TRAINING PROGRAM

## 2023-06-07 PROCEDURE — 99285 EMERGENCY DEPT VISIT HI MDM: CPT | Mod: 25

## 2023-06-07 PROCEDURE — 84484 ASSAY OF TROPONIN QUANT: CPT | Performed by: STUDENT IN AN ORGANIZED HEALTH CARE EDUCATION/TRAINING PROGRAM

## 2023-06-07 PROCEDURE — 87502 INFLUENZA DNA AMP PROBE: CPT | Performed by: STUDENT IN AN ORGANIZED HEALTH CARE EDUCATION/TRAINING PROGRAM

## 2023-06-07 PROCEDURE — 93010 EKG 12-LEAD: ICD-10-PCS | Mod: ,,, | Performed by: INTERNAL MEDICINE

## 2023-06-07 PROCEDURE — 36415 COLL VENOUS BLD VENIPUNCTURE: CPT | Performed by: STUDENT IN AN ORGANIZED HEALTH CARE EDUCATION/TRAINING PROGRAM

## 2023-06-07 PROCEDURE — 81003 URINALYSIS AUTO W/O SCOPE: CPT | Performed by: STUDENT IN AN ORGANIZED HEALTH CARE EDUCATION/TRAINING PROGRAM

## 2023-06-07 PROCEDURE — 85025 COMPLETE CBC W/AUTO DIFF WBC: CPT | Performed by: STUDENT IN AN ORGANIZED HEALTH CARE EDUCATION/TRAINING PROGRAM

## 2023-06-07 PROCEDURE — 93010 ELECTROCARDIOGRAM REPORT: CPT | Mod: ,,, | Performed by: INTERNAL MEDICINE

## 2023-06-07 RX ORDER — HYOSCYAMINE SULFATE 0.12 MG/1
0.12 TABLET SUBLINGUAL
Status: COMPLETED | OUTPATIENT
Start: 2023-06-07 | End: 2023-06-07

## 2023-06-07 RX ADMIN — HYOSCYAMINE SULFATE 0.12 MG: 0.12 TABLET ORAL at 07:06

## 2023-06-07 NOTE — DISCHARGE INSTRUCTIONS
Please follow up with your primary care physician within 2 days. Ensure that you review all lab work results and/or imaging results. If you have any questions about your discharge paperwork please call the Emergency Department.     Return to the ED for any fevers, nausea, vomiting, abdominal pain, diarrhea, inability to take food or water by mouth without vomiting, or any new or worsening symptoms.     Return to the ED for any chest pain, upper abdominal pain, shortness of breath, lightheadedness, or any new or worsening symptoms.     If you were prescribed antibiotics, please take them to completion. If you were prescribed a narcotic or any sedating medication, do not drive or operate heavy equipment or machinery while taking these medications.    Thank you for visiting [facility], Department of Emergency Medicine. Please see the entirety of the educational materials provided. Please note that a visit to the emergency department does not substitute ongoing care from a primary medical provider or specialist. Please ensure to follow up as recommended. However, please return to the emergency department immediately if symptoms do not improve as discussed, symptoms worsen, new symptoms develop, difficulty in following up or for any of your concerns or issues. Please note on discharge you are acknowledging understanding and agreement on medical evaluation, management recommendations and follow up recommendations.

## 2023-06-07 NOTE — ED PROVIDER NOTES
Encounter Date: 6/7/2023       History     Chief Complaint   Patient presents with    General Illness     Pt c/c of diarrhea, abd pain and weakness.  Pt reprots having diarrhea after eating,  some nausea and no vomiting.  Pt denies fever, states she feels fatigued.      53-year-old female with history of hypertension, PCOS, presenting with diarrhea and general weakness for couple of days.  Reports some nausea, but no vomiting.  No fever.  Patient is able to take p.o. patient reports a feeling of abdominal fullness, but no pain.  No back pain or dysuria.  No other complaints.  States that family members suffered from recent URI symptoms.    Review of patient's allergies indicates:   Allergen Reactions    Ozempic [semaglutide]      Chest tightness,visual disturbances, vomiting, headaches, fatigue     Past Medical History:   Diagnosis Date    Abnormal Pap smear of cervix     Acute stress reaction 11/04/2020    Adjustment insomnia 6/19/2019    SUZIE (acute kidney injury) 12/2016    after first gastric sleeve  had to be dialyzed    Bariatric surgery status     Gastric sleeve    Chronic anxiety     COPD (chronic obstructive pulmonary disease)     COVID-19 01/24/2022    Degenerative scoliosis in adult patient     Esophageal spasm 02/15/2018    Essential (primary) hypertension     Fibromyalgia     Hyperlipidemia     Hypothyroidism     JANETTE (iron deficiency anemia)     Inflammatory osteoarthritis     MDD (major depressive disorder), recurrent episode, mild 12/07/2020    Mixed common migraine and muscle contraction headache 11/04/2020    Muscle spasm     OCD (obsessive compulsive disorder)     MICHELINE on CPAP     3cm     PCOS (polycystic ovarian syndrome)     Pleuritic chest pain     Prediabetes     PTSD (post-traumatic stress disorder) 11/04/2020    Rectocele     Traumatic perforation of esophagus 11/2015    During original Gastric Sleeve surgery     Past Surgical History:   Procedure Laterality Date    BARIATRIC SURGERY  12/2016     Dr Estevez  Gastric sleeve, max weight 363    BLADDER SUSPENSION  2013    [facility]aye    carpal tunnel Right 10/2022    Dr. Bear, and had injected    EPIDURAL STEROID INJECTION INTO LUMBAR SPINE      Bud    KNEE ARTHROSCOPY  2020    KNEE ARTHROSCOPY W/ MENISCECTOMY Right 2014    ANAND Jenkins osteochondroma    LAPAROSCOPIC REVISION OF NOEMI-EN-Y GASTROENTEROSTOMY N/A 2018    Procedure: REVISION, GASTROENTEROSTOMY, NOEMI-EN-Y, LAPAROSCOPIC;  Surgeon: Vitaliy Hoskins Jr., MD;  Location: [facility] OR [address] [place] [address];  Service: General;  Laterality: N/A;    SLEEVE GASTROPLASTY      TONSILLECTOMY      as child    TOTAL ABDOMINAL HYSTERECTOMY W/ BILATERAL SALPINGOOPHORECTOMY  2013    Good    TOTAL KNEE ARTHROPLASTY Right 2020    ANTIONE Jenkins    TUBAL LIGATION      WISDOM TOOTH EXTRACTION       Family History   Problem Relation Age of Onset    Hypertension Mother     Rheum arthritis Mother     Alcohol abuse Mother     Mental illness Father     COPD Father     Lung cancer Father         mesothelioma    No Known Problems Sister     No Known Problems Brother     Katie Parkinson White syndrome Grandchild     Mitochondrial disorder Grandchild     Breast cancer Maternal Aunt     Breast cancer Maternal Aunt     Breast cancer Maternal Aunt     Ovarian cancer Maternal Cousin     Breast cancer Paternal Aunt     Colon cancer Neg Hx      Social History     Tobacco Use    Smoking status: Some Days     Packs/day: 0.00     Years: 15.00     Pack years: 0.00     Types: Cigarettes     Start date:      Last attempt to quit: 2018     Years since quittin.9    Smokeless tobacco: Never    Tobacco comments:     hasn't smoke since    Substance Use Topics    Alcohol use: No    Drug use: Yes     Types: Hydrocodone     Review of Systems   Constitutional:  Negative for fever.   HENT:  Negative for sore throat.    Respiratory:  Negative for shortness of breath.    Cardiovascular:  Negative for chest pain.    Gastrointestinal:  Positive for diarrhea and nausea. Negative for abdominal pain and vomiting.   Genitourinary:  Negative for dysuria.   Musculoskeletal:  Negative for back pain.   Skin:  Negative for rash.   Neurological:  Negative for weakness.   Hematological:  Does not bruise/bleed easily.     Physical Exam     Initial Vitals [06/07/23 0636]   BP Pulse Resp Temp SpO2   (!) 135/90 80 20 98.3 °F (36.8 °C) 100 %      MAP       --         Physical Exam    Nursing note and vitals reviewed.  Constitutional: She appears well-developed.   HENT:   Head: Normocephalic.   Eyes: Pupils are equal, round, and reactive to light.   Neck:   Normal range of motion.  Cardiovascular:            No murmur heard.  Pulmonary/Chest: No respiratory distress.   Abdominal: Abdomen is soft.   No TTP diffusely. No guarding, rebound, or masses. Negative Blakely's sign. No TTP at McBurney's point. No CVAT bilaterally.     Musculoskeletal:         General: No edema.      Cervical back: Normal range of motion.     Neurological: She is alert.   Skin: Skin is warm.   Psychiatric: She has a normal mood and affect.       ED Course   Procedures  Labs Reviewed   CBC W/ AUTO DIFFERENTIAL - Abnormal; Notable for the following components:       Result Value    Mono % 3.9 (*)     All other components within normal limits   URINALYSIS, REFLEX TO URINE CULTURE - Abnormal; Notable for the following components:    Specific Gravity, UA <=1.005 (*)     All other components within normal limits    Narrative:     Specimen Source->Urine   GROUP A STREP, MOLECULAR   INFLUENZA A & B BY MOLECULAR   SARS-COV-2 RNA AMPLIFICATION, QUAL   COMPREHENSIVE METABOLIC PANEL   TROPONIN I     EKG Readings: (Independently Interpreted)   Initial Reading: No STEMI. Rhythm: Normal Sinus Rhythm. Heart Rate: 60. Ectopy: No Ectopy. Conduction: Normal.     Imaging Results              X-Ray Chest AP Portable (Final result)  Result time 06/07/23 08:11:44      Final result by Ronal INMAN  Avtar Sibley MD (06/07/23 08:11:44)                   Impression:      1. No evidence of acute cardiopulmonary findings.  2. Mild cardiomegaly with biventricular dominance.  3. No evidence of appreciable change when compared to previous.      Electronically signed by: Ronal Nova MD  Date:    06/07/2023  Time:    08:11               Narrative:    EXAMINATION:  XR CHEST AP PORTABLE    CLINICAL HISTORY:  chest pain;    TECHNIQUE:  Single frontal view of the chest was performed.    COMPARISON:  03/23/2023    FINDINGS:  Cardiomediastinal silhouette is prominent size with evidence of minimal biventricular dominance.  Lungs are well expanded and free of active disease.  Pulmonary vascularity without engorgement.  No evidence of pneumothorax.  Regional skeleton appears within normal limits.  The upper abdominal cavity is unremarkable.                                       Medications   hyoscyamine ODT 0.125 mg (0.125 mg Oral Given 6/7/23 0711)     Medical Decision Making:   Differential Diagnosis:   DDX: Unlikely acute abdominal pathology such as appendicitis/cholecystitis given benign abdomen, negative Blakely's sign at this time. Do not suspect pancreatitis as no epigastric TTP. Do not suspect UTI as no dysuria, hematuria, or suprapubic TTP. Possible GERD/gastritis vs. AGE given history, benign abdomen.  Given exposure to viral syndromes, will screen for viral causes.  DX: Will treat symptomatically at this time and reassess with serial abdominal exams. If patient not improved or worsened, will consider labwork consisting of BMP, CBC, LFT, lipase, UA/Udip. Consider CT A/P if change in abdominal exam to assess for early appendicitis. Consider ultrasound if change in abdominal exam to assess for cholecystitis.  TX: Analgesia PRN. Antiemetic PRN. Treatment/consult as indicated by clinical status and studies if performed.  Dispo: If studies WNL, symptoms controlled, tolerating PO, discharge to follow up with primary doctor  within 2 days with recommendations for supportive care at home and strict precautions for return.             ED Course as of 06/07/23 0825 Wed Jun 07, 2023   0824 Patient reports that she has had intermittent bouts of chest tightness over the last few days.  Will check troponin EKG.    8:24 AM  Troponin and EKG negative.  Chest x-ray shows no acute findings.  Will discharge. [NB]      ED Course User Index  [NB] José Miguel Dash MD                 Clinical Impression:   Final diagnoses:  [K52.9] Gastroenteritis (Primary)  [R07.9] Chest pain        ED Disposition Condition    Discharge Stable          ED Prescriptions    None       Follow-up Information       Follow up With Specialties Details Why Contact Info    Claritza Chase NP Internal Medicine Schedule an appointment as soon as possible for a visit in 2 days  [address] [facility]  [address]  [facility] [address]  [phone]      [facility] - Emergency Dept Emergency Medicine  If symptoms worsen [address] [facility] [id number] [phone]             José Miguel Dash MD  06/07/23 0646       José Miguel Dash MD  06/07/23 0756       José Miguel Dash MD  06/07/23 0875

## 2023-09-06 PROBLEM — R73.09 LABILE BLOOD GLUCOSE: Status: ACTIVE | Noted: 2023-09-06

## 2023-09-06 PROBLEM — E88.810 INSULIN RESISTANCE SYNDROME: Status: ACTIVE | Noted: 2023-09-06

## 2023-09-06 PROBLEM — E78.00 PURE HYPERCHOLESTEROLEMIA: Status: ACTIVE | Noted: 2023-09-06

## 2023-09-06 PROBLEM — Z98.84 WEIGHT GAIN FOLLOWING GASTRIC BYPASS SURGERY: Status: ACTIVE | Noted: 2023-09-06

## 2023-09-06 PROBLEM — R63.5 WEIGHT GAIN FOLLOWING GASTRIC BYPASS SURGERY: Status: ACTIVE | Noted: 2023-09-06

## 2023-09-06 PROBLEM — E88.810 METABOLIC SYNDROME: Status: ACTIVE | Noted: 2023-09-06

## 2023-09-20 ENCOUNTER — OFFICE VISIT (OUTPATIENT)
Dept: OBSTETRICS AND GYNECOLOGY | Facility: CLINIC | Age: 53
End: 2023-09-20
Payer: MEDICARE

## 2023-09-20 VITALS
SYSTOLIC BLOOD PRESSURE: 122 MMHG | DIASTOLIC BLOOD PRESSURE: 68 MMHG | WEIGHT: 198.5 LBS | HEIGHT: 68 IN | HEART RATE: 94 BPM | BODY MASS INDEX: 30.08 KG/M2

## 2023-09-20 DIAGNOSIS — Z90.722 HISTORY OF TOTAL HYSTERECTOMY WITH BILATERAL SALPINGO-OOPHORECTOMY (BSO): ICD-10-CM

## 2023-09-20 DIAGNOSIS — Z90.710 HISTORY OF TOTAL HYSTERECTOMY WITH BILATERAL SALPINGO-OOPHORECTOMY (BSO): ICD-10-CM

## 2023-09-20 DIAGNOSIS — Z01.419 WELL WOMAN EXAM WITH ROUTINE GYNECOLOGICAL EXAM: Primary | ICD-10-CM

## 2023-09-20 DIAGNOSIS — Z90.79 HISTORY OF TOTAL HYSTERECTOMY WITH BILATERAL SALPINGO-OOPHORECTOMY (BSO): ICD-10-CM

## 2023-09-20 PROCEDURE — 3061F PR NEG MICROALBUMINURIA RESULT DOCUMENTED/REVIEW: ICD-10-PCS | Mod: CPTII,S$GLB,, | Performed by: OBSTETRICS & GYNECOLOGY

## 2023-09-20 PROCEDURE — 1159F MED LIST DOCD IN RCRD: CPT | Mod: CPTII,S$GLB,, | Performed by: OBSTETRICS & GYNECOLOGY

## 2023-09-20 PROCEDURE — 3044F HG A1C LEVEL LT 7.0%: CPT | Mod: CPTII,S$GLB,, | Performed by: OBSTETRICS & GYNECOLOGY

## 2023-09-20 PROCEDURE — 3074F PR MOST RECENT SYSTOLIC BLOOD PRESSURE < 130 MM HG: ICD-10-PCS | Mod: CPTII,S$GLB,, | Performed by: OBSTETRICS & GYNECOLOGY

## 2023-09-20 PROCEDURE — 1160F PR REVIEW ALL MEDS BY PRESCRIBER/CLIN PHARMACIST DOCUMENTED: ICD-10-PCS | Mod: CPTII,S$GLB,, | Performed by: OBSTETRICS & GYNECOLOGY

## 2023-09-20 PROCEDURE — 3078F DIAST BP <80 MM HG: CPT | Mod: CPTII,S$GLB,, | Performed by: OBSTETRICS & GYNECOLOGY

## 2023-09-20 PROCEDURE — 99999 PR PBB SHADOW E&M-EST. PATIENT-LVL III: CPT | Mod: PBBFAC,,, | Performed by: OBSTETRICS & GYNECOLOGY

## 2023-09-20 PROCEDURE — 3044F PR MOST RECENT HEMOGLOBIN A1C LEVEL <7.0%: ICD-10-PCS | Mod: CPTII,S$GLB,, | Performed by: OBSTETRICS & GYNECOLOGY

## 2023-09-20 PROCEDURE — 99999 PR PBB SHADOW E&M-EST. PATIENT-LVL III: ICD-10-PCS | Mod: PBBFAC,,, | Performed by: OBSTETRICS & GYNECOLOGY

## 2023-09-20 PROCEDURE — 1160F RVW MEDS BY RX/DR IN RCRD: CPT | Mod: CPTII,S$GLB,, | Performed by: OBSTETRICS & GYNECOLOGY

## 2023-09-20 PROCEDURE — 3061F NEG MICROALBUMINURIA REV: CPT | Mod: CPTII,S$GLB,, | Performed by: OBSTETRICS & GYNECOLOGY

## 2023-09-20 PROCEDURE — 3066F NEPHROPATHY DOC TX: CPT | Mod: CPTII,S$GLB,, | Performed by: OBSTETRICS & GYNECOLOGY

## 2023-09-20 PROCEDURE — 3074F SYST BP LT 130 MM HG: CPT | Mod: CPTII,S$GLB,, | Performed by: OBSTETRICS & GYNECOLOGY

## 2023-09-20 PROCEDURE — 3078F PR MOST RECENT DIASTOLIC BLOOD PRESSURE < 80 MM HG: ICD-10-PCS | Mod: CPTII,S$GLB,, | Performed by: OBSTETRICS & GYNECOLOGY

## 2023-09-20 PROCEDURE — 3008F PR BODY MASS INDEX (BMI) DOCUMENTED: ICD-10-PCS | Mod: CPTII,S$GLB,, | Performed by: OBSTETRICS & GYNECOLOGY

## 2023-09-20 PROCEDURE — G0101 CA SCREEN;PELVIC/BREAST EXAM: HCPCS | Mod: S$GLB,,, | Performed by: OBSTETRICS & GYNECOLOGY

## 2023-09-20 PROCEDURE — 3008F BODY MASS INDEX DOCD: CPT | Mod: CPTII,S$GLB,, | Performed by: OBSTETRICS & GYNECOLOGY

## 2023-09-20 PROCEDURE — G0101 PR CA SCREEN;PELVIC/BREAST EXAM: ICD-10-PCS | Mod: S$GLB,,, | Performed by: OBSTETRICS & GYNECOLOGY

## 2023-09-20 PROCEDURE — 1159F PR MEDICATION LIST DOCUMENTED IN MEDICAL RECORD: ICD-10-PCS | Mod: CPTII,S$GLB,, | Performed by: OBSTETRICS & GYNECOLOGY

## 2023-09-20 PROCEDURE — 3066F PR DOCUMENTATION OF TREATMENT FOR NEPHROPATHY: ICD-10-PCS | Mod: CPTII,S$GLB,, | Performed by: OBSTETRICS & GYNECOLOGY

## 2023-09-20 RX ORDER — ASPIRIN 81 MG/1
TABLET ORAL
COMMUNITY

## 2023-09-20 NOTE — PROGRESS NOTES
Subjective:    Patient ID: Karine Nayak is a 53 y.o. y.o. female.     Chief Complaint: Annual Well Woman Exam     History of Present Illness:  aKrine presents today for Annual Well Woman exam. She describes her menses as  absent, h/o hysterectomy and BSO .She denies pelvic pain.  She denies breast tenderness, masses, nipple discharge. She denies GYN complaints. She denies difficulty with urination. Admits to irregular bowel movements. She admits to menopausal symptoms such as hotflashes, vaginal dryness, and night sweats. She denies bloating, early satiety, or weight changes. She is sexually active.     Menstrual History:   Patient's last menstrual period was 2013..     OB History    : 2  Para: 2  Term: 2  Livin  Live Births: 2            The following portions of the patient's history were reviewed and updated as appropriate: allergies, current medications, past family history, past medical history, past social history, past surgical history, and problem list.    ROS:   Review of Systems   Constitutional:  Negative for chills, diaphoresis, fatigue, fever and unexpected weight change.   HENT:  Negative for congestion, hearing loss, rhinorrhea and sore throat.    Eyes:  Negative for pain, discharge and visual disturbance.   Respiratory:  Positive for apnea, chest tightness and shortness of breath. Negative for cough and wheezing.    Cardiovascular:  Positive for leg swelling. Negative for chest pain and palpitations.   Gastrointestinal:  Negative for abdominal pain, constipation, diarrhea, nausea and vomiting.   Endocrine: Negative for cold intolerance and heat intolerance.   Genitourinary:  Negative for difficulty urinating, dyspareunia, dysuria, flank pain, frequency, genital sores, hematuria, menstrual problem, pelvic pain, vaginal bleeding, vaginal discharge and vaginal pain.   Musculoskeletal:  Negative for arthralgias, back pain and joint swelling.   Skin:  Negative for rash.   Neurological:   Negative for dizziness, weakness, light-headedness, numbness and headaches.   Psychiatric/Behavioral:  Positive for decreased concentration and sleep disturbance. Negative for agitation and confusion. The patient is nervous/anxious.          Objective:    Vital Signs:  Vitals:    09/20/23 1428   BP: 122/68   Pulse: 94       Physical Exam:  General:  alert, cooperative, no distress   Skin:  Skin color, texture, turgor normal. No rashes or lesions   HEENT:  extra ocular movement intact, sclera clear, anicteric   Neck: supple, trachea midline, no adenopathy or thyromegally   Respiratory:  Normal effort   Breasts:  no discharge, erythema, tenderness, or palpable masses; no axillary lymphadenopathy   Abdomen:  soft, nontender, no palpable masses   Pelvis: External genitalia: normal general appearance  Urinary system: urethral meatus normal, bladder nontender  Vaginal: normal without tenderness, induration or masses, atrophic mucosa  Cervix: removed surgically  Uterus: removed surgically  Adnexa: removed surgically   Extremities: Normal ROM; no edema, no cyanosis   Neurologial: Normal strength and tone. No focal numbness or weakness.   Psychiatric: normal mood, speech, dress, and thought processes         Assessment:       Healthy female exam.     1. Well woman exam with routine gynecological exam    2. History of total hysterectomy with bilateral salpingo-oophorectomy (BSO)          Plan:      Well woman exam with routine gynecological exam    History of total hysterectomy with bilateral salpingo-oophorectomy (BSO)        [facility] 4/2023 negative  Colonoscopy scheduled    COUNSELING:  Karine was counseled on A.C.O.G. Pap guidelines and recommendations for yearly pelvic exams in addition to recommendations for monthly self breast exams; to see her PCP for other health maintenance.

## 2023-11-25 ENCOUNTER — NURSE TRIAGE (OUTPATIENT)
Dept: ADMINISTRATIVE | Facility: CLINIC | Age: 53
End: 2023-11-25
Payer: MEDICARE

## 2023-11-25 NOTE — TELEPHONE ENCOUNTER
Patient took double dose of hydrochlorothiazide.    Reason for Disposition   [1] DOUBLE DOSE (an extra dose or lesser amount) of prescription drug AND [2] NO symptoms  (Exception: A double dose of antibiotics.)    Additional Information   Negative: [1] Intentional drug overdose AND [2] suicidal thoughts or ideas   Negative: MORE THAN A DOUBLE DOSE of a prescription or over-the-counter (OTC) drug   Negative: [1] DOUBLE DOSE (an extra dose or lesser amount) of prescription drug AND [2] any symptoms (e.g., dizziness, nausea, pain, sleepiness)   Negative: [1] DOUBLE DOSE (an extra dose or lesser amount) of over-the-counter (OTC) drug AND [2] any symptoms (e.g., dizziness, nausea, pain, sleepiness)   Negative: Took another person's prescription drug    Protocols used: Medication Question Call-A-

## 2023-12-14 ENCOUNTER — HOSPITAL ENCOUNTER (EMERGENCY)
Facility: HOSPITAL | Age: 53
Discharge: HOME | End: 2023-12-14
Attending: SURGERY
Payer: MEDICARE

## 2023-12-14 VITALS
WEIGHT: 188.81 LBS | DIASTOLIC BLOOD PRESSURE: 50 MMHG | BODY MASS INDEX: 28.71 KG/M2 | HEART RATE: 97 BPM | TEMPERATURE: 98 F | SYSTOLIC BLOOD PRESSURE: 101 MMHG | OXYGEN SATURATION: 96 % | RESPIRATION RATE: 20 BRPM

## 2023-12-14 DIAGNOSIS — J40 BRONCHITIS: Primary | ICD-10-CM

## 2023-12-14 LAB
GROUP A STREP, MOLECULAR: NEGATIVE
INFLUENZA A, MOLECULAR: NEGATIVE
INFLUENZA B, MOLECULAR: NEGATIVE
SARS-COV-2 RDRP RESP QL NAA+PROBE: NEGATIVE
SPECIMEN SOURCE: NORMAL

## 2023-12-14 PROCEDURE — 99284 EMERGENCY DEPT VISIT MOD MDM: CPT

## 2023-12-14 PROCEDURE — 87502 INFLUENZA DNA AMP PROBE: CPT | Performed by: SURGERY

## 2023-12-14 PROCEDURE — 87651 STREP A DNA AMP PROBE: CPT | Performed by: SURGERY

## 2023-12-14 PROCEDURE — U0002 COVID-19 LAB TEST NON-CDC: HCPCS | Performed by: SURGERY

## 2023-12-14 RX ORDER — DOXYCYCLINE 100 MG/1
100 CAPSULE ORAL 2 TIMES DAILY
Qty: 20 CAPSULE | Refills: 0 | Status: SHIPPED | OUTPATIENT
Start: 2023-12-14 | End: 2023-12-24

## 2023-12-14 RX ORDER — CETIRIZINE HYDROCHLORIDE 10 MG/1
10 TABLET ORAL DAILY
Qty: 30 TABLET | Refills: 0 | Status: SHIPPED | OUTPATIENT
Start: 2023-12-14 | End: 2024-03-20

## 2023-12-14 RX ORDER — BENZONATATE 100 MG/1
200 CAPSULE ORAL 3 TIMES DAILY PRN
Qty: 20 CAPSULE | Refills: 0 | Status: SHIPPED | OUTPATIENT
Start: 2023-12-14 | End: 2023-12-24

## 2023-12-14 NOTE — ED PROVIDER NOTES
Encounter Date: 12/14/2023       History     Chief Complaint   Patient presents with    General Illness     Pt c/o cough, congestion, and sore throat that started a week ago.     Karine Nayak is a 53 y.o. female that presents with nasal congestion today  Nasal congestion cough cold symptoms with no wheezing or sputum now  No obvious fever, no obvious shortness of breath, no signs of distress here  Patient reports postnasal drip & a sore throat for last week or so on triage  On discussion with the patient, she had like a COVID & influenza swab    The history is provided by the patient.     Review of patient's allergies indicates:   Allergen Reactions    Ozempic [semaglutide]      Chest tightness,visual disturbances, vomiting, headaches, fatigue     Past Medical History:   Diagnosis Date    Abnormal Pap smear of cervix     Acute stress reaction 11/04/2020    Adjustment insomnia 06/19/2019    SUZIE (acute kidney injury) 12/2016    after first gastric sleeve  had to be dialyzed    Bariatric surgery status     Gastric sleeve    Chronic anxiety     COPD (chronic obstructive pulmonary disease)     COVID-19 01/24/2022    Degenerative scoliosis in adult patient     Esophageal spasm 02/15/2018    Essential (primary) hypertension     Fibromyalgia     Hyperlipidemia     Hypothyroidism     JANETTE (iron deficiency anemia)     Inflammatory osteoarthritis     MDD (major depressive disorder), recurrent episode, mild 12/07/2020    Mixed common migraine and muscle contraction headache 11/04/2020    Muscle spasm     OCD (obsessive compulsive disorder)     MICHELINE on CPAP     3cm     PCOS (polycystic ovarian syndrome)     Personal history of colonic polyps 11/09/2023    Pleuritic chest pain     Prediabetes     PTSD (post-traumatic stress disorder) 11/04/2020    Rectocele     Traumatic perforation of esophagus 11/2015    During original Gastric Sleeve surgery     Past Surgical History:   Procedure Laterality Date    BARIATRIC SURGERY  12/2016     Dr Estevez  Gastric sleeve, max weight 363    BLADDER SUSPENSION  2013    Lafaye    carpal tunnel Right 10/2022    Dr. Bear, and had injected    COLONOSCOPY  2023    Tay Davies MD    EPIDURAL STEROID INJECTION INTO LUMBAR SPINE      [place]    KNEE ARTHROSCOPY  2020    KNEE ARTHROSCOPY W/ MENISCECTOMY Right 2014    ANAND Jenkins osteochondroma    LAPAROSCOPIC REVISION OF NOEMI-EN-Y GASTROENTEROSTOMY N/A 2018    Procedure: REVISION, GASTROENTEROSTOMY, NOEMI-EN-Y, LAPAROSCOPIC;  Surgeon: Vitaliy Hoskins Jr., MD;  Location: [facility] OR [address] [place] [address];  Service: General;  Laterality: N/A;    SLEEVE GASTROPLASTY      TONSILLECTOMY      as child    TOTAL ABDOMINAL HYSTERECTOMY W/ BILATERAL SALPINGOOPHORECTOMY      Good    TOTAL KNEE ARTHROPLASTY Right 2020    ANTIONE Jenkins    TUBAL LIGATION      WISDOM TOOTH EXTRACTION       Family History   Problem Relation Age of Onset    Hypertension Mother     Rheum arthritis Mother     Alcohol abuse Mother     Mental illness Father     COPD Father     Lung cancer Father         mesothelioma    No Known Problems Sister     No Known Problems Brother     Katie Parkinson White syndrome Grandchild     Mitochondrial disorder Grandchild     Breast cancer Maternal Aunt     Breast cancer Maternal Aunt     Breast cancer Maternal Aunt     Ovarian cancer Maternal Cousin     Breast cancer Paternal Aunt     Colon cancer Neg Hx      Social History     Tobacco Use    Smoking status: Some Days     Current packs/day: 0.00     Types: Cigarettes     Start date:      Last attempt to quit: 2018     Years since quittin.4    Smokeless tobacco: Never    Tobacco comments:     hasn't smoke since    Substance Use Topics    Alcohol use: No    Drug use: Yes     Types: Hydrocodone     Review of Systems   Constitutional: Negative.    HENT:  Positive for congestion, postnasal drip and sore throat.    Eyes: Negative.    Respiratory:  Positive for cough.    Cardiovascular:  Negative.    Gastrointestinal: Negative.    Genitourinary: Negative.    Musculoskeletal: Negative.    Skin: Negative.    Neurological: Negative.    Psychiatric/Behavioral: Negative.         Physical Exam     Initial Vitals [12/14/23 0751]   BP Pulse Resp Temp SpO2   (!) 101/50 97 20 97.8 °F (36.6 °C) 96 %      MAP       --         Physical Exam    Nursing note and vitals reviewed.  Constitutional: Vital signs are normal. She appears well-developed and well-nourished. She is cooperative.   HENT:   Head: Normocephalic and atraumatic.   (+) clear nasal drainage with postnasal drip; nasal mucosa erythema  (+) mild pharyngitis without tonsillitis or exudate    Eyes: Conjunctivae, EOM and lids are normal. Pupils are equal, round, and reactive to light.   Neck: Trachea normal and phonation normal. Neck supple. No JVD present.   Normal range of motion.   Full passive range of motion without pain.     Cardiovascular:  Normal rate, regular rhythm, S1 normal, S2 normal, normal heart sounds, intact distal pulses and normal pulses.           Pulmonary/Chest: Effort normal and breath sounds normal.   Abdominal: Abdomen is soft and flat. Bowel sounds are normal.   Musculoskeletal:         General: Normal range of motion.      Cervical back: Full passive range of motion without pain, normal range of motion and neck supple.     Neurological: She is alert and oriented to person, place, and time. She has normal strength.   Skin: Skin is warm, dry and intact. Capillary refill takes less than 2 seconds.         ED Course   Procedures  Labs Reviewed   GROUP A STREP, MOLECULAR   INFLUENZA A & B BY MOLECULAR   SARS-COV-2 RNA AMPLIFICATION, QUAL   RSV ANTIGEN DETECTION          Imaging Results    None          Medications - No data to display    Medical Decision Making  53-year-old female presents with nasal congestion cough cold symptoms x7 days  Differential: flu, strep, COVID, bronchitis, pneumonia, URI, virus, otitis media    Problems  Addressed:  Bronchitis: complicated acute illness or injury    Amount and/or Complexity of Data Reviewed  Labs: ordered. Decision-making details documented in ED Course.    ED Management & Risks of Complication, Morbidity, & Mortality:  (-) swabs in the emergency room with likely bronchitis symptoms on presentation  Zyrtec, doxycycline & cough medication prescribed on ER discharge today  Carefully counseled to follow-up within next 48 hours after ER discharge  Follow-up with PCP &/or specialist until complete resolution of symptoms  Pt/Family counseled to return to the ER with any concerning symptoms     Need for Hospitalization or Surgery with Social Determinants of Health:  This patient does not need to be hospitalized on ER evaluation today  The patient's diagnosis is not limited by social determinants of health  Does not require surgery or procedure (major or minor), no risk factors    Clinical Impression:  Final diagnoses:  [J40] Bronchitis (Primary)          ED Disposition Condition    Discharge Stable          ED Prescriptions       Medication Sig Dispense Start Date End Date Auth. Provider    doxycycline (VIBRAMYCIN) 100 MG Cap Take 1 capsule (100 mg total) by mouth 2 (two) times daily. for 10 days 20 capsule 12/14/2023 12/24/2023 Alfredo Del Cid MD    cetirizine (ZYRTEC) 10 MG tablet Take 1 tablet (10 mg total) by mouth once daily. 30 tablet 12/14/2023 1/13/2024 Alfredo Del Cid MD    benzonatate (TESSALON) 100 MG capsule Take 2 capsules (200 mg total) by mouth 3 (three) times daily as needed for Cough. 20 capsule 12/14/2023 12/24/2023 Alfredo Del Cid MD          Follow-up Information       Follow up With Specialties Details Why Contact Info    Claritza Chase NP Internal Medicine Schedule an appointment as soon as possible for a visit in 2 days  [address] [address]  [phone]               Alfredo Del Cid MD  12/14/23 4716

## 2024-01-08 ENCOUNTER — HOSPITAL ENCOUNTER (EMERGENCY)
Facility: HOSPITAL | Age: 54
Discharge: HOME | End: 2024-01-08
Attending: STUDENT IN AN ORGANIZED HEALTH CARE EDUCATION/TRAINING PROGRAM
Payer: MEDICARE

## 2024-01-08 VITALS
TEMPERATURE: 99 F | BODY MASS INDEX: 27.89 KG/M2 | WEIGHT: 184 LBS | HEIGHT: 68 IN | DIASTOLIC BLOOD PRESSURE: 66 MMHG | HEART RATE: 92 BPM | OXYGEN SATURATION: 95 % | SYSTOLIC BLOOD PRESSURE: 103 MMHG | RESPIRATION RATE: 18 BRPM

## 2024-01-08 DIAGNOSIS — B34.9 VIRAL SYNDROME: Primary | ICD-10-CM

## 2024-01-08 PROCEDURE — 87502 INFLUENZA DNA AMP PROBE: CPT | Performed by: STUDENT IN AN ORGANIZED HEALTH CARE EDUCATION/TRAINING PROGRAM

## 2024-01-08 PROCEDURE — 99282 EMERGENCY DEPT VISIT SF MDM: CPT

## 2024-01-08 PROCEDURE — 87651 STREP A DNA AMP PROBE: CPT | Performed by: STUDENT IN AN ORGANIZED HEALTH CARE EDUCATION/TRAINING PROGRAM

## 2024-01-08 PROCEDURE — U0002 COVID-19 LAB TEST NON-CDC: HCPCS | Performed by: STUDENT IN AN ORGANIZED HEALTH CARE EDUCATION/TRAINING PROGRAM

## 2024-01-09 NOTE — ED PROVIDER NOTES
Encounter Date: 1/8/2024       History     Chief Complaint   Patient presents with    General Illness     Pt to ED with c/o chills, body aches, congestion and sore throat that began yesterday.     53-year-old female with history of hypertension and diabetes presenting with cough, congestion, sore throat, body aches.  No chest pain or shortness breath.  No other complaints.      Review of patient's allergies indicates:   Allergen Reactions    Ozempic [semaglutide]      Chest tightness,visual disturbances, vomiting, headaches, fatigue     Past Medical History:   Diagnosis Date    Abnormal Pap smear of cervix     Acute stress reaction 11/04/2020    Adjustment insomnia 06/19/2019    SUZIE (acute kidney injury) 12/2016    after first gastric sleeve  had to be dialyzed    Bariatric surgery status     Gastric sleeve    Chronic anxiety     COPD (chronic obstructive pulmonary disease)     COVID-19 01/24/2022    Degenerative scoliosis in adult patient     Esophageal spasm 02/15/2018    Essential (primary) hypertension     Fibromyalgia     Hyperlipidemia     Hypothyroidism     JANETTE (iron deficiency anemia)     Inflammatory osteoarthritis     MDD (major depressive disorder), recurrent episode, mild 12/07/2020    Mixed common migraine and muscle contraction headache 11/04/2020    Muscle spasm     OCD (obsessive compulsive disorder)     MICHELINE on CPAP     3cm     PCOS (polycystic ovarian syndrome)     Personal history of colonic polyps 11/09/2023    Pleuritic chest pain     Prediabetes     PTSD (post-traumatic stress disorder) 11/04/2020    Rectocele     Traumatic perforation of esophagus 11/2015    During original Gastric Sleeve surgery     Past Surgical History:   Procedure Laterality Date    BARIATRIC SURGERY  12/2016    Dr Estevez  Gastric sleeve, max weight 363    BLADDER SUSPENSION  2013    [place]    carpal tunnel Right 10/2022    Dr. Bear, and had injected    COLONOSCOPY  11/09/2023    Tay Davies MD    EPIDURAL STEROID  INJECTION INTO LUMBAR SPINE  2016    [place]    KNEE ARTHROSCOPY  2020    KNEE ARTHROSCOPY W/ MENISCECTOMY Right 2014    ANAND Jenkins osteochondroma    LAPAROSCOPIC REVISION OF NOEMI-EN-Y GASTROENTEROSTOMY N/A 2018    Procedure: REVISION, GASTROENTEROSTOMY, NOEMI-EN-Y, LAPAROSCOPIC;  Surgeon: Vitaliy Hoskins Jr., MD;  Location: [facility] OR [address] [place] [address];  Service: General;  Laterality: N/A;    SLEEVE GASTROPLASTY      TONSILLECTOMY      as child    TOTAL ABDOMINAL HYSTERECTOMY W/ BILATERAL SALPINGOOPHORECTOMY      Good    TOTAL KNEE ARTHROPLASTY Right 2020    ANTIONE Jenkins    TUBAL LIGATION      WISDOM TOOTH EXTRACTION       Family History   Problem Relation Age of Onset    Hypertension Mother     Rheum arthritis Mother     Alcohol abuse Mother     Mental illness Father     COPD Father     Lung cancer Father         mesothelioma    No Known Problems Sister     No Known Problems Brother     Katie Parkinson White syndrome Grandchild     Mitochondrial disorder Grandchild     Breast cancer Maternal Aunt     Breast cancer Maternal Aunt     Breast cancer Maternal Aunt     Ovarian cancer Maternal Cousin     Breast cancer Paternal Aunt     Colon cancer Neg Hx      Social History     Tobacco Use    Smoking status: Some Days     Current packs/day: 0.00     Types: Cigarettes     Start date:      Last attempt to quit: 2018     Years since quittin.5    Smokeless tobacco: Never    Tobacco comments:     hasn't smoke since    Substance Use Topics    Alcohol use: No    Drug use: Yes     Types: Hydrocodone     Review of Systems   Constitutional:  Negative for fever.   HENT:  Positive for congestion and sore throat.    Respiratory:  Positive for cough. Negative for shortness of breath.    Cardiovascular:  Negative for chest pain.   Gastrointestinal:  Negative for nausea.   Genitourinary:  Negative for dysuria.   Musculoskeletal:  Positive for myalgias. Negative for back pain.   Skin:  Negative for rash.    Neurological:  Negative for weakness.   Hematological:  Does not bruise/bleed easily.       Physical Exam     Initial Vitals [01/08/24 1906]   BP Pulse Resp Temp SpO2   103/66 92 18 98.6 °F (37 °C) 95 %      MAP       --         Physical Exam    Nursing note and vitals reviewed.  Constitutional: She appears well-developed.   HENT:   Head: Normocephalic.   Eyes: Pupils are equal, round, and reactive to light.   Neck:   Normal range of motion.  Cardiovascular:            No murmur heard.  Pulmonary/Chest: No respiratory distress.   Clear lungs bilaterally.  No respiratory distress.  No wheezing or rales.  Good air movement.       Abdominal: Abdomen is soft.   Musculoskeletal:         General: No edema.      Cervical back: Normal range of motion.     Neurological: She is alert.   Skin: Skin is warm.   Psychiatric: She has a normal mood and affect.         ED Course   Procedures  Labs Reviewed - No data to display       Imaging Results    None          Medications - No data to display  Medical Decision Making  DDX:  Patient presenting with symptoms of an upper respiratory virus.  Concern for COVID, especially given recent surges in the current pandemic.  Unlikely pneumonia based on history, exam, vitals.  Do not suspect OM given sxs.  DX:  COVID. Influenza. Strep  TX: Analgesia PRN.   Dispo: Discharge to follow up with PMD within 3-5 days with precautions for RTED and supportive care recommendations.                                          Clinical Impression:  Final diagnoses:  [B34.9] Viral syndrome (Primary)                 José Miguel Dash MD  01/08/24 1907

## 2024-01-09 NOTE — DISCHARGE INSTRUCTIONS
Please follow up with your primary care physician within 2 days. Ensure that you review all lab work results and/or imaging results. If you have any questions about your discharge paperwork please call the Emergency Department.     Return to the Emergency Department for any fevers, worsening cough or congestion, shortness of breath, chest pain, nausea, vomiting, diarrhea, if symptoms do not improve, or for any new or worsening symptoms, unless otherwise told.       If you were prescribed antibiotics, please take them to completion. If you were prescribed a narcotic or any sedating medication, do not drive or operate heavy equipment or machinery while taking these medications.  If you were diagnosed with a seizure, syncope, any loss of consciousness or decreased alertness, do not drive, swim, operate heavy machinery, or per yourself in any position where a sudden loss of consciousness could put herself or others in danger.    Thank you for visiting [facility], Department of Emergency Medicine. Please see the entirety of the educational materials provided. Please note that a visit to the emergency department does not substitute ongoing care from a primary medical provider or specialist. Please ensure to follow up as recommended. However, please return to the emergency department immediately if symptoms do not improve as discussed, symptoms worsen, new symptoms develop, difficulty in following up or for any of your concerns or issues. Please note on discharge you are acknowledging understanding and agreement on medical evaluation, management recommendations and follow up recommendations.

## 2024-01-16 PROBLEM — U07.1 COVID-19 VIRUS INFECTION: Status: ACTIVE | Noted: 2024-01-16

## 2024-03-05 ENCOUNTER — HOSPITAL ENCOUNTER (EMERGENCY)
Facility: HOSPITAL | Age: 54
Discharge: HOME | End: 2024-03-05
Attending: SURGERY
Payer: MEDICARE

## 2024-03-05 VITALS
DIASTOLIC BLOOD PRESSURE: 70 MMHG | BODY MASS INDEX: 28.74 KG/M2 | OXYGEN SATURATION: 96 % | SYSTOLIC BLOOD PRESSURE: 113 MMHG | RESPIRATION RATE: 16 BRPM | TEMPERATURE: 98 F | WEIGHT: 189 LBS | HEART RATE: 61 BPM

## 2024-03-05 DIAGNOSIS — R07.89 CHEST WALL PAIN: Primary | ICD-10-CM

## 2024-03-05 DIAGNOSIS — R07.9 CHEST PAIN: ICD-10-CM

## 2024-03-05 LAB
ALBUMIN SERPL BCP-MCNC: 3.6 G/DL (ref 3.5–5.2)
ALP SERPL-CCNC: 64 U/L (ref 55–135)
ALT SERPL W/O P-5'-P-CCNC: 61 U/L (ref 10–44)
ANION GAP SERPL CALC-SCNC: 10 MMOL/L (ref 8–16)
AST SERPL-CCNC: 29 U/L (ref 10–40)
BASOPHILS # BLD AUTO: 0.06 K/UL (ref 0–0.2)
BASOPHILS NFR BLD: 0.8 % (ref 0–1.9)
BILIRUB SERPL-MCNC: 0.5 MG/DL (ref 0.1–1)
BILIRUB UR QL STRIP: NEGATIVE
BNP SERPL-MCNC: 44 PG/ML (ref 0–99)
BUN SERPL-MCNC: 11 MG/DL (ref 6–20)
CALCIUM SERPL-MCNC: 9.4 MG/DL (ref 8.7–10.5)
CHLORIDE SERPL-SCNC: 103 MMOL/L (ref 95–110)
CK SERPL-CCNC: 68 U/L (ref 20–180)
CLARITY UR: CLEAR
CO2 SERPL-SCNC: 28 MMOL/L (ref 23–29)
COLOR UR: YELLOW
CREAT SERPL-MCNC: 0.7 MG/DL (ref 0.5–1.4)
D DIMER PPP IA.FEU-MCNC: 0.34 MG/L FEU
DIFFERENTIAL METHOD BLD: ABNORMAL
EOSINOPHIL # BLD AUTO: 0.4 K/UL (ref 0–0.5)
EOSINOPHIL NFR BLD: 4.9 % (ref 0–8)
ERYTHROCYTE [DISTWIDTH] IN BLOOD BY AUTOMATED COUNT: 13.7 % (ref 11.5–14.5)
EST. GFR  (NO RACE VARIABLE): >60 ML/MIN/1.73 M^2
GLUCOSE SERPL-MCNC: 91 MG/DL (ref 70–110)
GLUCOSE UR QL STRIP: NEGATIVE
HCT VFR BLD AUTO: 36.6 % (ref 37–48.5)
HGB BLD-MCNC: 12.5 G/DL (ref 12–16)
HGB UR QL STRIP: NEGATIVE
IMM GRANULOCYTES # BLD AUTO: 0.02 K/UL (ref 0–0.04)
IMM GRANULOCYTES NFR BLD AUTO: 0.3 % (ref 0–0.5)
INFLUENZA A, MOLECULAR: NEGATIVE
INFLUENZA B, MOLECULAR: NEGATIVE
KETONES UR QL STRIP: NEGATIVE
LEUKOCYTE ESTERASE UR QL STRIP: NEGATIVE
LYMPHOCYTES # BLD AUTO: 3.5 K/UL (ref 1–4.8)
LYMPHOCYTES NFR BLD: 44.5 % (ref 18–48)
MCH RBC QN AUTO: 28.2 PG (ref 27–31)
MCHC RBC AUTO-ENTMCNC: 34.2 G/DL (ref 32–36)
MCV RBC AUTO: 83 FL (ref 82–98)
MONOCYTES # BLD AUTO: 0.4 K/UL (ref 0.3–1)
MONOCYTES NFR BLD: 5.5 % (ref 4–15)
NEUTROPHILS # BLD AUTO: 3.5 K/UL (ref 1.8–7.7)
NEUTROPHILS NFR BLD: 44 % (ref 38–73)
NITRITE UR QL STRIP: NEGATIVE
NRBC BLD-RTO: 0 /100 WBC
PH UR STRIP: 7 [PH] (ref 5–8)
PLATELET # BLD AUTO: 327 K/UL (ref 150–450)
PMV BLD AUTO: 9.6 FL (ref 9.2–12.9)
POTASSIUM SERPL-SCNC: 3.7 MMOL/L (ref 3.5–5.1)
PROT SERPL-MCNC: 6.9 G/DL (ref 6–8.4)
PROT UR QL STRIP: NEGATIVE
RBC # BLD AUTO: 4.43 M/UL (ref 4–5.4)
SARS-COV-2 RDRP RESP QL NAA+PROBE: NEGATIVE
SODIUM SERPL-SCNC: 141 MMOL/L (ref 136–145)
SP GR UR STRIP: 1.01 (ref 1–1.03)
SPECIMEN SOURCE: NORMAL
TROPONIN I SERPL DL<=0.01 NG/ML-MCNC: <0.006 NG/ML (ref 0–0.03)
TROPONIN I SERPL DL<=0.01 NG/ML-MCNC: <0.006 NG/ML (ref 0–0.03)
URN SPEC COLLECT METH UR: NORMAL
UROBILINOGEN UR STRIP-ACNC: NEGATIVE EU/DL
WBC # BLD AUTO: 7.82 K/UL (ref 3.9–12.7)

## 2024-03-05 PROCEDURE — 63600175 PHARM REV CODE 636 W HCPCS: Performed by: NURSE PRACTITIONER

## 2024-03-05 PROCEDURE — 85379 FIBRIN DEGRADATION QUANT: CPT | Performed by: NURSE PRACTITIONER

## 2024-03-05 PROCEDURE — 87502 INFLUENZA DNA AMP PROBE: CPT | Performed by: NURSE PRACTITIONER

## 2024-03-05 PROCEDURE — 80053 COMPREHEN METABOLIC PANEL: CPT | Performed by: NURSE PRACTITIONER

## 2024-03-05 PROCEDURE — 93005 ELECTROCARDIOGRAM TRACING: CPT

## 2024-03-05 PROCEDURE — 84484 ASSAY OF TROPONIN QUANT: CPT | Mod: 91 | Performed by: NURSE PRACTITIONER

## 2024-03-05 PROCEDURE — U0002 COVID-19 LAB TEST NON-CDC: HCPCS | Performed by: NURSE PRACTITIONER

## 2024-03-05 PROCEDURE — 96372 THER/PROPH/DIAG INJ SC/IM: CPT | Performed by: NURSE PRACTITIONER

## 2024-03-05 PROCEDURE — 83880 ASSAY OF NATRIURETIC PEPTIDE: CPT | Performed by: NURSE PRACTITIONER

## 2024-03-05 PROCEDURE — 99285 EMERGENCY DEPT VISIT HI MDM: CPT | Mod: 25

## 2024-03-05 PROCEDURE — 85025 COMPLETE CBC W/AUTO DIFF WBC: CPT | Performed by: NURSE PRACTITIONER

## 2024-03-05 PROCEDURE — 93010 ELECTROCARDIOGRAM REPORT: CPT | Mod: ,,, | Performed by: INTERNAL MEDICINE

## 2024-03-05 PROCEDURE — 82550 ASSAY OF CK (CPK): CPT | Performed by: NURSE PRACTITIONER

## 2024-03-05 PROCEDURE — 81003 URINALYSIS AUTO W/O SCOPE: CPT | Performed by: NURSE PRACTITIONER

## 2024-03-05 RX ORDER — KETOROLAC TROMETHAMINE 30 MG/ML
30 INJECTION, SOLUTION INTRAMUSCULAR; INTRAVENOUS
Status: COMPLETED | OUTPATIENT
Start: 2024-03-05 | End: 2024-03-05

## 2024-03-05 RX ORDER — KETOROLAC TROMETHAMINE 10 MG/1
10 TABLET, FILM COATED ORAL EVERY 6 HOURS
Qty: 20 TABLET | Refills: 0 | Status: SHIPPED | OUTPATIENT
Start: 2024-03-05 | End: 2024-03-10

## 2024-03-05 RX ADMIN — KETOROLAC TROMETHAMINE 30 MG: 30 INJECTION, SOLUTION INTRAMUSCULAR at 02:03

## 2024-03-05 NOTE — ED PROVIDER NOTES
Encounter Date: 3/5/2024       History     Chief Complaint   Patient presents with    Chest Pain     Patient to ER CC of chest pain and SOB that started about 20 minutes ago while cleaning her house, states the pain is a sharp pain and is having numbness to her left side     Shortness of Breath     Chief complaint: Chest pain   54-year-old female with a history COPD, fibromyalgia high cholesterol hypothyroidism depression OCD iron-deficiency anemia reason for pleuritic chest pain presents to be evaluated for chest pain that began 30 minutes prior to arrival.  Patient states she was spring cleaning and began having a midsternal aching chest pain that is nonradiating.  She also reports she has some intermittent shortness of breath.  Denies any heavy lifting.  Denies any palpitations.  She does take an aspirin daily      Review of patient's allergies indicates:   Allergen Reactions    Ozempic [semaglutide]      Chest tightness,visual disturbances, vomiting, headaches, fatigue     Past Medical History:   Diagnosis Date    Abnormal Pap smear of cervix     Acute stress reaction 11/04/2020    Adjustment insomnia 06/19/2019    SUZIE (acute kidney injury) 12/2016    after first gastric sleeve  had to be dialyzed    Bariatric surgery status     Gastric sleeve    Chronic anxiety     COPD (chronic obstructive pulmonary disease)     COVID-19 01/24/2022    COVID-19 virus infection 01/12/2024    Degenerative scoliosis in adult patient     Esophageal spasm 02/15/2018    Essential (primary) hypertension     Fibromyalgia     Hyperlipidemia     Hypothyroidism     JANETTE (iron deficiency anemia)     Inflammatory osteoarthritis     MDD (major depressive disorder), recurrent episode, mild 12/07/2020    Mixed common migraine and muscle contraction headache 11/04/2020    Muscle spasm     OCD (obsessive compulsive disorder)     MICHELINE on CPAP     3cm     PCOS (polycystic ovarian syndrome)     Personal history of colonic polyps 11/09/2023     Pleuritic chest pain     Prediabetes     PTSD (post-traumatic stress disorder) 2020    Rectocele     Traumatic perforation of esophagus 2015    During original Gastric Sleeve surgery     Past Surgical History:   Procedure Laterality Date    BARIATRIC SURGERY  2016    Dr Estevez  Gastric sleeve, max weight 363    BLADDER SUSPENSION  2013    [place]    carpal tunnel Right 10/2022    Dr. Bear, and had injected    COLONOSCOPY  2023    Tay Davies MD    EPIDURAL STEROID INJECTION INTO LUMBAR SPINE      [place]    KNEE ARTHROSCOPY  2020    KNEE ARTHROSCOPY W/ MENISCECTOMY Right 2014    ANAND Jenkins osteochondroma    LAPAROSCOPIC REVISION OF NOEMI-EN-Y GASTROENTEROSTOMY N/A 2018    Procedure: REVISION, GASTROENTEROSTOMY, NOEMI-EN-Y, LAPAROSCOPIC;  Surgeon: Vitaliy Hoskins Jr., MD;  Location: [facility] OR [address] [place] [address];  Service: General;  Laterality: N/A;    SLEEVE GASTROPLASTY      TONSILLECTOMY      as child    TOTAL ABDOMINAL HYSTERECTOMY W/ BILATERAL SALPINGOOPHORECTOMY      Good    TOTAL KNEE ARTHROPLASTY Right 2020    ANTIONE Jenkins    TUBAL LIGATION      WISDOM TOOTH EXTRACTION       Family History   Problem Relation Age of Onset    Hypertension Mother     Rheum arthritis Mother     Alcohol abuse Mother     Mental illness Father     COPD Father     Lung cancer Father         mesothelioma    No Known Problems Sister     No Known Problems Brother     Katie Parkinson White syndrome Grandchild     Mitochondrial disorder Grandchild     Breast cancer Maternal Aunt     Breast cancer Maternal Aunt     Breast cancer Maternal Aunt     Ovarian cancer Maternal Cousin     Breast cancer Paternal Aunt     Colon cancer Neg Hx      Social History     Tobacco Use    Smoking status: Some Days     Current packs/day: 0.00     Types: Cigarettes     Start date:      Last attempt to quit: 2018     Years since quittin.6    Smokeless tobacco: Never    Tobacco comments:     hasn't smoke since     Substance Use Topics    Alcohol use: No    Drug use: Yes     Types: Hydrocodone     Review of Systems   Constitutional:  Negative for fatigue and fever.   Respiratory:  Positive for chest tightness and shortness of breath.    Cardiovascular:  Positive for chest pain. Negative for palpitations.   Gastrointestinal:  Negative for abdominal pain.   Neurological:  Negative for weakness and numbness.       Physical Exam     Initial Vitals [03/05/24 1308]   BP Pulse Resp Temp SpO2   128/74 64 20 96.8 °F (36 °C) 98 %      MAP       --         Physical Exam    Nursing note and vitals reviewed.  Constitutional: She appears well-developed and well-nourished.   HENT:   Head: Normocephalic and atraumatic.   Cardiovascular:  Normal rate, regular rhythm and normal heart sounds.     Exam reveals no gallop and no friction rub.       No murmur heard.  Pulmonary/Chest: She has no wheezes. She has rhonchi. She has no rales.   Midsternal chest wall tenderness palpation   Musculoskeletal:         General: Tenderness present. Normal range of motion.     Neurological: She is alert and oriented to person, place, and time. She has normal strength.   Skin: Skin is warm and dry.   Psychiatric: She has a normal mood and affect. Thought content normal.         ED Course   Procedures  Labs Reviewed   CBC W/ AUTO DIFFERENTIAL - Abnormal; Notable for the following components:       Result Value    Hematocrit 36.6 (*)     All other components within normal limits   COMPREHENSIVE METABOLIC PANEL - Abnormal; Notable for the following components:    ALT 61 (*)     All other components within normal limits   INFLUENZA A & B BY MOLECULAR   B-TYPE NATRIURETIC PEPTIDE   D DIMER, QUANTITATIVE   TROPONIN I   URINALYSIS, REFLEX TO URINE CULTURE    Narrative:     Specimen Source->Urine   CK   SARS-COV-2 RNA AMPLIFICATION, QUAL   TROPONIN I     EKG Readings: (Independently Interpreted)   Initial Reading: No STEMI. Previous EKG: Compared with most  recent EKG Rhythm: Sinus Bradycardia. Heart Rate: 58. Ectopy: No Ectopy. Conduction: Normal.       Imaging Results              X-Ray Chest AP Portable (Final result)  Result time 03/05/24 14:09:45      Final result by Toño Walker MD (03/05/24 14:09:45)                   Impression:      Negative chest.  No significant change.      Electronically signed by: Toño Walker MD  Date:    03/05/2024  Time:    14:09               Narrative:    EXAMINATION:  XR CHEST AP PORTABLE    CLINICAL HISTORY:  Chest pain, unspecified    TECHNIQUE:  Single frontal view of the chest was performed.    COMPARISON:  06/07/2023    FINDINGS:  The cardiomediastinal silhouette is within normal limits.  The lungs are well expanded without consolidation or pleural effusion.                                       Medications   ketorolac injection 30 mg (30 mg Intramuscular Given 3/5/24 1447)     Medical Decision Making  54 year female with chest pain that began just prior to arrival.  Patient has midsternal reproducible chest pain with no associated shortness of breath or palpitations   Differential diagnoses include ACS, NSTEMI, STEMI, chest wall pain, costochondritis    Amount and/or Complexity of Data Reviewed  Labs: ordered.     Details: CBC, CMP, troponin, D-dimer, BNP  Radiology: ordered.     Details: Chest x-ray    Risk  Prescription drug management.  Risk Details: Patient with no murmurs no rubs no gallops on auscultation   Patient does have midsternal reproducible chest pain to palpation   Unremarkable chest x-ray   No leukocytosis no gross electrolyte abnormality   Negative cardiac workup ED including negative troponins x2   EKG normal  Patient is given Toradol in ED states she is feeling significantly better prior to discharge  Stable for DC with follow with cardiology/PCP in the next 24-48 hours given strict return precautions                                      Clinical Impression:  Final diagnoses:  [R07.9] Chest  pain  [R07.89] Chest wall pain (Primary)          ED Disposition Condition    Discharge Stable          ED Prescriptions       Medication Sig Dispense Start Date End Date Auth. Provider    ketorolac (TORADOL) 10 mg tablet Take 1 tablet (10 mg total) by mouth every 6 (six) hours. for 5 days 20 tablet 3/5/2024 3/10/2024 Kala Hankins, CANDACE          Follow-up Information    None          Kala Hankins, CANDACE  03/05/24 1704       Kala Hankins NP  03/05/24 1710

## 2024-03-05 NOTE — DISCHARGE INSTRUCTIONS
Follow-up with cardiology as soon as possible   If you develop any new worrisome symptoms please return to the emergency room

## 2024-03-06 LAB
OHS QRS DURATION: 88 MS
OHS QTC CALCULATION: 408 MS

## 2024-03-20 PROBLEM — Z79.890 HORMONE REPLACEMENT THERAPY (HRT): Chronic | Status: ACTIVE | Noted: 2021-02-19

## 2024-03-20 PROBLEM — R73.09 LABILE BLOOD GLUCOSE: Chronic | Status: ACTIVE | Noted: 2023-09-06

## 2024-03-20 PROBLEM — U07.1 COVID-19 VIRUS INFECTION: Status: RESOLVED | Noted: 2024-01-16 | Resolved: 2024-03-20

## 2024-03-20 PROBLEM — E88.810 INSULIN RESISTANCE SYNDROME: Chronic | Status: ACTIVE | Noted: 2023-09-06

## 2024-03-22 ENCOUNTER — HOSPITAL ENCOUNTER (EMERGENCY)
Facility: HOSPITAL | Age: 54
Discharge: HOME | End: 2024-03-22
Attending: STUDENT IN AN ORGANIZED HEALTH CARE EDUCATION/TRAINING PROGRAM
Payer: MEDICARE

## 2024-03-22 VITALS
SYSTOLIC BLOOD PRESSURE: 156 MMHG | BODY MASS INDEX: 30.81 KG/M2 | RESPIRATION RATE: 20 BRPM | HEART RATE: 82 BPM | TEMPERATURE: 98 F | WEIGHT: 202.63 LBS | OXYGEN SATURATION: 98 % | DIASTOLIC BLOOD PRESSURE: 81 MMHG

## 2024-03-22 DIAGNOSIS — E16.2 HYPOGLYCEMIA: Primary | ICD-10-CM

## 2024-03-22 LAB — POCT GLUCOSE: 92 MG/DL (ref 70–110)

## 2024-03-22 PROCEDURE — 99282 EMERGENCY DEPT VISIT SF MDM: CPT

## 2024-03-22 PROCEDURE — 82962 GLUCOSE BLOOD TEST: CPT

## 2024-03-22 NOTE — ED PROVIDER NOTES
Encounter Date: 3/22/2024       History     Chief Complaint   Patient presents with    Blood Sugar Problem     Pt reports she is pre-diabetic and recently got a Dex-com. States her blood sugars have been fluctuating high and low and have been concerning her. Denies any symptoms at this time.     54 year old female with a PMHx of anxiety, COPD, HTN, HLD, fibromylagia, MDD, OCD, pre-DM. She is on semaglutide. She reports around 4am this morning, her phone was alerting that her glucose was in the 40s so she came to the ED. Denies numbness, weakness, chest pain, headache, nausea, vomiting, lightheadedness, dizziness. Has been compliant with her medications. Alarm is making her very anxious and nervous. Glucose in the ED checked at 92.        Review of patient's allergies indicates:   Allergen Reactions    Ozempic [semaglutide]      Chest tightness,visual disturbances, vomiting, headaches, fatigue    Metformin      History of acute renal failure and hemodialysis     Past Medical History:   Diagnosis Date    Abnormal Pap smear of cervix     Acute stress reaction 11/04/2020    Adjustment insomnia 06/19/2019    SUZIE (acute kidney injury) 12/2016    after first gastric sleeve  had to be dialyzed    Bariatric surgery status     Gastric sleeve    Chronic anxiety     COPD (chronic obstructive pulmonary disease)     COVID-19 01/24/2022    COVID-19 virus infection 01/12/2024    Degenerative scoliosis in adult patient     Esophageal spasm 02/15/2018    Essential (primary) hypertension     Fibromyalgia     Hyperlipidemia     Hypothyroidism     JANETTE (iron deficiency anemia)     Inflammatory osteoarthritis     MDD (major depressive disorder), recurrent episode, mild 12/07/2020    Mixed common migraine and muscle contraction headache 11/04/2020    Muscle spasm     OCD (obsessive compulsive disorder)     MICHELINE on CPAP     3cm     PCOS (polycystic ovarian syndrome)     Personal history of colonic polyps 11/09/2023    Pleuritic chest pain      Prediabetes     PTSD (post-traumatic stress disorder) 11/04/2020    Rectocele     Traumatic perforation of esophagus 11/2015    During original Gastric Sleeve surgery     Past Surgical History:   Procedure Laterality Date    BARIATRIC SURGERY  12/2016    Dr Estevez  Gastric sleeve, max weight 363    BLADDER SUSPENSION  2013    [place]    carpal tunnel Right 10/2022    Dr. Bear, and had injected    COLONOSCOPY  11/09/2023    Tay Davies MD    EPIDURAL STEROID INJECTION INTO LUMBAR SPINE  2016    [place]    KNEE ARTHROSCOPY  03/04/2020    KNEE ARTHROSCOPY W/ MENISCECTOMY Right 11/2014    ANAND Jenkins osteochondroma    LAPAROSCOPIC REVISION OF NOEMI-EN-Y GASTROENTEROSTOMY N/A 11/08/2018    Procedure: REVISION, GASTROENTEROSTOMY, NOEMI-EN-Y, LAPAROSCOPIC;  Surgeon: Vitaliy Hoskins Jr., MD;  Location: [facility] OR [address] [place] [address];  Service: General;  Laterality: N/A;    SLEEVE GASTROPLASTY      TONSILLECTOMY      as child    TOTAL ABDOMINAL HYSTERECTOMY W/ BILATERAL SALPINGOOPHORECTOMY  2013    Good    TOTAL KNEE ARTHROPLASTY Right 03/2020    ANTIONE Jenkins    TUBAL LIGATION      WISDOM TOOTH EXTRACTION       Family History   Problem Relation Age of Onset    Hypertension Mother     Rheum arthritis Mother     Alcohol abuse Mother     Mental illness Father     COPD Father     Lung cancer Father         mesothelioma    No Known Problems Sister     No Known Problems Brother     Katie Parkinson White syndrome Grandchild     Mitochondrial disorder Grandchild     Breast cancer Maternal Aunt     Breast cancer Maternal Aunt     Breast cancer Maternal Aunt     Ovarian cancer Maternal Cousin     Breast cancer Paternal Aunt     Colon cancer Neg Hx      Social History     Tobacco Use    Smoking status: Some Days     Current packs/day: 0.25     Average packs/day: 0.3 packs/day for 23.2 years (5.8 ttl pk-yrs)     Types: Cigarettes     Start date: 2001    Smokeless tobacco: Never    Tobacco comments:     hasn't smoke since    Substance Use  Topics    Alcohol use: No    Drug use: Yes     Types: Hydrocodone     Review of Systems   Constitutional:  Negative for chills and fever.   HENT:  Negative for congestion, rhinorrhea and sneezing.    Eyes:  Negative for discharge and redness.   Respiratory:  Negative for cough and shortness of breath.    Cardiovascular:  Negative for chest pain and palpitations.   Gastrointestinal:  Negative for abdominal pain, diarrhea, nausea and vomiting.   Genitourinary:  Negative for dysuria, frequency, vaginal bleeding and vaginal discharge.   Musculoskeletal:  Negative for back pain and neck pain.   Skin:  Negative for rash and wound.   Neurological:  Negative for weakness, numbness and headaches.   Psychiatric/Behavioral:  The patient is nervous/anxious.        Physical Exam     Initial Vitals [03/22/24 0717]   BP Pulse Resp Temp SpO2   (!) 156/81 82 20 98 °F (36.7 °C) 98 %      MAP       --         Physical Exam    Nursing note and vitals reviewed.  Constitutional: She appears well-developed. She is not diaphoretic. No distress.   HENT:   Head: Normocephalic and atraumatic.   Right Ear: External ear normal.   Left Ear: External ear normal.   Eyes: Right eye exhibits no discharge. Left eye exhibits no discharge. No scleral icterus.   Neck: Neck supple.   Cardiovascular:  Normal rate and regular rhythm.           Pulmonary/Chest: Breath sounds normal. No stridor. No respiratory distress. She has no wheezes. She has no rhonchi. She has no rales.   Abdominal: Abdomen is soft. There is no abdominal tenderness. There is no guarding.   Musculoskeletal:         General: No edema.      Cervical back: Neck supple.      Comments: Right upper arm Dexcom in place, firmly attached, no surrounding infection     Neurological: She is alert and oriented to person, place, and time.   Skin: Skin is warm and dry. Capillary refill takes less than 2 seconds.   Psychiatric: Her mood appears anxious.         ED Course   Procedures  Labs Reviewed    POCT GLUCOSE   POCT GLUCOSE MONITORING CONTINUOUS          Imaging Results    None          Medications - No data to display  Medical Decision Making  Ddx: hypoglycemia, long-acting insulin, long-acting sulfonylurea use, AMS    54 year old female with a hypoglycemia alarm today. Glucose in the ED checked as normal. We reviewed her glucose monitoring fermin and did not identify the alarm that patient reported went off. Please see photos. Her glucose levels from 3/20/24, 3/21/24, 3/22/24 did not identify any hypoglycemic events. She is unable to identify the alarm/message on her phone of the hypoglycemic episode. Unsure if it was a mistake or error. Will recommend if alarm happens again to screenshot it, finger stick herself for glucose. PCP f/u advised otherwise. Return precautions given (low or uncontrolled/high glucose). Patient is in agreement.                                                  Clinical Impression:  Final diagnoses:  [E16.2] Hypoglycemia (Primary)          ED Disposition Condition    Discharge Stable          ED Prescriptions    None       Follow-up Information       Follow up With Specialties Details Why Contact Info    Claritza Chase NP Internal Medicine Schedule an appointment as soon as possible for a visit in 3 days  [address] [address]  [phone]               Juancarlos Ervin DO  03/22/24 0816

## 2024-04-19 PROBLEM — Z87.448 HISTORY OF ACUTE RENAL FAILURE: Status: ACTIVE | Noted: 2024-04-19

## 2024-04-26 ENCOUNTER — HOSPITAL ENCOUNTER (EMERGENCY)
Facility: HOSPITAL | Age: 54
Discharge: HOME | End: 2024-04-27
Attending: STUDENT IN AN ORGANIZED HEALTH CARE EDUCATION/TRAINING PROGRAM
Payer: MEDICARE

## 2024-04-26 DIAGNOSIS — K21.9 GASTROESOPHAGEAL REFLUX DISEASE, UNSPECIFIED WHETHER ESOPHAGITIS PRESENT: ICD-10-CM

## 2024-04-26 DIAGNOSIS — R07.9 ACUTE CHEST PAIN: Primary | ICD-10-CM

## 2024-04-26 LAB
ALBUMIN SERPL BCP-MCNC: 3.4 G/DL (ref 3.5–5.2)
ALP SERPL-CCNC: 82 U/L (ref 55–135)
ALT SERPL W/O P-5'-P-CCNC: 83 U/L (ref 10–44)
ANION GAP SERPL CALC-SCNC: 7 MMOL/L (ref 8–16)
AST SERPL-CCNC: 30 U/L (ref 10–40)
BASOPHILS # BLD AUTO: 0.07 K/UL (ref 0–0.2)
BASOPHILS NFR BLD: 0.7 % (ref 0–1.9)
BILIRUB SERPL-MCNC: 0.3 MG/DL (ref 0.1–1)
BUN SERPL-MCNC: 21 MG/DL (ref 6–20)
CALCIUM SERPL-MCNC: 9.1 MG/DL (ref 8.7–10.5)
CHLORIDE SERPL-SCNC: 105 MMOL/L (ref 95–110)
CO2 SERPL-SCNC: 28 MMOL/L (ref 23–29)
CREAT SERPL-MCNC: 0.7 MG/DL (ref 0.5–1.4)
D DIMER PPP IA.FEU-MCNC: 0.62 MG/L FEU
DIFFERENTIAL METHOD BLD: ABNORMAL
EOSINOPHIL # BLD AUTO: 0.5 K/UL (ref 0–0.5)
EOSINOPHIL NFR BLD: 5.6 % (ref 0–8)
ERYTHROCYTE [DISTWIDTH] IN BLOOD BY AUTOMATED COUNT: 13.7 % (ref 11.5–14.5)
EST. GFR  (NO RACE VARIABLE): >60 ML/MIN/1.73 M^2
GLUCOSE SERPL-MCNC: 98 MG/DL (ref 70–110)
HCT VFR BLD AUTO: 34.7 % (ref 37–48.5)
HGB BLD-MCNC: 11.9 G/DL (ref 12–16)
IMM GRANULOCYTES # BLD AUTO: 0.05 K/UL (ref 0–0.04)
IMM GRANULOCYTES NFR BLD AUTO: 0.5 % (ref 0–0.5)
LIPASE SERPL-CCNC: 111 U/L (ref 4–60)
LYMPHOCYTES # BLD AUTO: 3.2 K/UL (ref 1–4.8)
LYMPHOCYTES NFR BLD: 33.5 % (ref 18–48)
MCH RBC QN AUTO: 28.4 PG (ref 27–31)
MCHC RBC AUTO-ENTMCNC: 34.3 G/DL (ref 32–36)
MCV RBC AUTO: 83 FL (ref 82–98)
MONOCYTES # BLD AUTO: 0.6 K/UL (ref 0.3–1)
MONOCYTES NFR BLD: 5.8 % (ref 4–15)
NEUTROPHILS # BLD AUTO: 5.1 K/UL (ref 1.8–7.7)
NEUTROPHILS NFR BLD: 53.9 % (ref 38–73)
NRBC BLD-RTO: 0 /100 WBC
PLATELET # BLD AUTO: 314 K/UL (ref 150–450)
PMV BLD AUTO: 9.1 FL (ref 9.2–12.9)
POTASSIUM SERPL-SCNC: 3.6 MMOL/L (ref 3.5–5.1)
PROT SERPL-MCNC: 6.9 G/DL (ref 6–8.4)
RBC # BLD AUTO: 4.19 M/UL (ref 4–5.4)
SODIUM SERPL-SCNC: 140 MMOL/L (ref 136–145)
TROPONIN I SERPL DL<=0.01 NG/ML-MCNC: <0.006 NG/ML (ref 0–0.03)
WBC # BLD AUTO: 9.48 K/UL (ref 3.9–12.7)

## 2024-04-26 PROCEDURE — 83690 ASSAY OF LIPASE: CPT | Performed by: STUDENT IN AN ORGANIZED HEALTH CARE EDUCATION/TRAINING PROGRAM

## 2024-04-26 PROCEDURE — 84484 ASSAY OF TROPONIN QUANT: CPT | Performed by: STUDENT IN AN ORGANIZED HEALTH CARE EDUCATION/TRAINING PROGRAM

## 2024-04-26 PROCEDURE — 99900031 HC PATIENT EDUCATION (STAT)

## 2024-04-26 PROCEDURE — 93010 ELECTROCARDIOGRAM REPORT: CPT | Mod: ,,, | Performed by: INTERNAL MEDICINE

## 2024-04-26 PROCEDURE — 85025 COMPLETE CBC W/AUTO DIFF WBC: CPT | Performed by: STUDENT IN AN ORGANIZED HEALTH CARE EDUCATION/TRAINING PROGRAM

## 2024-04-26 PROCEDURE — 25000003 PHARM REV CODE 250: Performed by: STUDENT IN AN ORGANIZED HEALTH CARE EDUCATION/TRAINING PROGRAM

## 2024-04-26 PROCEDURE — 85379 FIBRIN DEGRADATION QUANT: CPT | Performed by: STUDENT IN AN ORGANIZED HEALTH CARE EDUCATION/TRAINING PROGRAM

## 2024-04-26 PROCEDURE — 99285 EMERGENCY DEPT VISIT HI MDM: CPT | Mod: 25

## 2024-04-26 PROCEDURE — 93005 ELECTROCARDIOGRAM TRACING: CPT

## 2024-04-26 PROCEDURE — 99900035 HC TECH TIME PER 15 MIN (STAT)

## 2024-04-26 PROCEDURE — 80053 COMPREHEN METABOLIC PANEL: CPT | Performed by: STUDENT IN AN ORGANIZED HEALTH CARE EDUCATION/TRAINING PROGRAM

## 2024-04-26 RX ORDER — ALUMINUM HYDROXIDE, MAGNESIUM HYDROXIDE, AND SIMETHICONE 1200; 120; 1200 MG/30ML; MG/30ML; MG/30ML
30 SUSPENSION ORAL
Status: COMPLETED | OUTPATIENT
Start: 2024-04-26 | End: 2024-04-26

## 2024-04-26 RX ORDER — LIDOCAINE HYDROCHLORIDE 20 MG/ML
15 SOLUTION OROPHARYNGEAL ONCE
Status: COMPLETED | OUTPATIENT
Start: 2024-04-26 | End: 2024-04-26

## 2024-04-26 RX ADMIN — IOHEXOL 100 ML: 350 INJECTION, SOLUTION INTRAVENOUS at 11:04

## 2024-04-26 RX ADMIN — ALUMINUM HYDROXIDE, MAGNESIUM HYDROXIDE, AND DIMETHICONE 30 ML: 200; 20; 200 SUSPENSION ORAL at 10:04

## 2024-04-26 RX ADMIN — LIDOCAINE HYDROCHLORIDE 15 ML: 20 SOLUTION ORAL at 10:04

## 2024-04-27 VITALS
DIASTOLIC BLOOD PRESSURE: 85 MMHG | TEMPERATURE: 98 F | RESPIRATION RATE: 18 BRPM | SYSTOLIC BLOOD PRESSURE: 121 MMHG | BODY MASS INDEX: 33.35 KG/M2 | HEART RATE: 76 BPM | WEIGHT: 219.38 LBS | OXYGEN SATURATION: 98 %

## 2024-04-27 PROCEDURE — 25500020 PHARM REV CODE 255: Performed by: STUDENT IN AN ORGANIZED HEALTH CARE EDUCATION/TRAINING PROGRAM

## 2024-04-27 RX ORDER — ESOMEPRAZOLE MAGNESIUM 40 MG/1
40 CAPSULE, DELAYED RELEASE ORAL DAILY
Qty: 30 CAPSULE | Refills: 0 | Status: SHIPPED | OUTPATIENT
Start: 2024-04-27 | End: 2024-05-21 | Stop reason: SDUPTHER

## 2024-04-27 NOTE — ED PROVIDER NOTES
Encounter Date: 4/26/2024       History     Chief Complaint   Patient presents with    Chest Pain     AMB TO ED WITH C/O OF WHAT SHE THOUGHT WAS INDIGESTION 30 MINS PTA. TOOK ANTIACIDS WHICH DIDN'T HELP. STATES PAIN IS IN THE CENTER OF HER CHEST THAT RADIATES TO UPPER BACK.      54 year old female with a PMHx of gastric sleeve, COPD, anxiety, HTN, HLD, hypothyroidism, PTSD, smoking presents to the ED with mid chest pain burning radiating to the back. Started when she was going to bed. Took 2 chewable antiacids without much relief. No history of CAD, heart attacks, stents. Smokes cigarettes. No family history of heart disease.        Review of patient's allergies indicates:   Allergen Reactions    Ozempic [semaglutide]      Chest tightness,visual disturbances, vomiting, headaches, fatigue    Metformin      History of acute renal failure and hemodialysis     Past Medical History:   Diagnosis Date    Abnormal Pap smear of cervix     Acute stress reaction 11/04/2020    Adjustment insomnia 06/19/2019    SUZIE (acute kidney injury) 12/2016    after first gastric sleeve  had to be dialyzed    Bariatric surgery status     Gastric sleeve    Chronic anxiety     COPD (chronic obstructive pulmonary disease)     COVID-19 01/24/2022    COVID-19 virus infection 01/12/2024    Degenerative scoliosis in adult patient     Esophageal spasm 02/15/2018    Essential (primary) hypertension     Fibromyalgia     History of acute renal failure 04/19/2024    After gastric sleeve surgery; required hemodialysis      Hyperlipidemia     Hypothyroidism     JANETTE (iron deficiency anemia)     Inflammatory osteoarthritis     MDD (major depressive disorder), recurrent episode, mild 12/07/2020    Mixed common migraine and muscle contraction headache 11/04/2020    Muscle spasm     OCD (obsessive compulsive disorder)     MICHELINE on CPAP     3cm     PCOS (polycystic ovarian syndrome)     Personal history of colonic polyps 11/09/2023    Pleuritic chest pain      Prediabetes     PTSD (post-traumatic stress disorder) 11/04/2020    Rectocele     Traumatic perforation of esophagus 11/2015    During original Gastric Sleeve surgery     Past Surgical History:   Procedure Laterality Date    BARIATRIC SURGERY  12/2016    Dr Estevez  Gastric sleeve, max weight 363    BLADDER SUSPENSION  2013    [place]    carpal tunnel Right 10/2022    Dr. Bear, and had injected    COLONOSCOPY  11/09/2023    Tay Davies MD    EPIDURAL STEROID INJECTION INTO LUMBAR SPINE  2016    [place]    KNEE ARTHROSCOPY  03/04/2020    KNEE ARTHROSCOPY W/ MENISCECTOMY Right 11/2014    ANAND Jenkins osteochondroma    LAPAROSCOPIC REVISION OF NOEMI-EN-Y GASTROENTEROSTOMY N/A 11/08/2018    Procedure: REVISION, GASTROENTEROSTOMY, NOEMI-EN-Y, LAPAROSCOPIC;  Surgeon: Vitaliy Hoskins Jr., MD;  Location: [facility] OR [address] [place] [address];  Service: General;  Laterality: N/A;    SLEEVE GASTROPLASTY      TONSILLECTOMY      as child    TOTAL ABDOMINAL HYSTERECTOMY W/ BILATERAL SALPINGOOPHORECTOMY  2013    [place]    TOTAL KNEE ARTHROPLASTY Right 03/2020    ANTIONE Jenkins    TUBAL LIGATION      WISDOM TOOTH EXTRACTION       Family History   Problem Relation Name Age of Onset    Hypertension Mother Jennifer     Rheum arthritis Mother Jennifer     Alcohol abuse Mother Jennifer     Mental illness Father      COPD Father      Lung cancer Father          mesothelioma    No Known Problems Sister      No Known Problems Brother      Katie Parkinson White syndrome Grandchild Roxana Radha     Mitochondrial disorder Grandchild Roxana Radha     Breast cancer Maternal Aunt      Breast cancer Maternal Aunt      Breast cancer Maternal Aunt      Ovarian cancer Maternal Cousin      Breast cancer Paternal Aunt      Colon cancer Neg Hx       Social History     Tobacco Use    Smoking status: Some Days     Current packs/day: 0.25     Average packs/day: 0.3 packs/day for 23.3 years (5.8 ttl pk-yrs)     Types: Cigarettes     Start date: 2001    Smokeless tobacco: Never    Tobacco  comments:     hasn't smoke since    Substance Use Topics    Alcohol use: No    Drug use: Yes     Types: Hydrocodone     Review of Systems   Constitutional:  Negative for chills and fever.   HENT:  Negative for congestion, rhinorrhea and sneezing.    Eyes:  Negative for discharge and redness.   Respiratory:  Negative for cough and shortness of breath.    Cardiovascular:  Positive for chest pain. Negative for palpitations.   Gastrointestinal:  Negative for abdominal pain, diarrhea, nausea and vomiting.   Genitourinary:  Negative for dysuria, frequency, vaginal bleeding and vaginal discharge.   Musculoskeletal:  Negative for back pain and neck pain.   Skin:  Negative for rash and wound.   Neurological:  Negative for weakness, numbness and headaches.       Physical Exam     Initial Vitals [04/26/24 2145]   BP Pulse Resp Temp SpO2   125/66 86 18 98.3 °F (36.8 °C) --      MAP       --         Physical Exam    Nursing note and vitals reviewed.  Constitutional: She appears well-developed. She is not diaphoretic. No distress.   HENT:   Head: Normocephalic and atraumatic.   Right Ear: External ear normal.   Left Ear: External ear normal.   Eyes: Right eye exhibits no discharge. Left eye exhibits no discharge. No scleral icterus.   Neck: Neck supple.   Cardiovascular:  Normal rate and regular rhythm.           Pulmonary/Chest: Breath sounds normal. No stridor. No respiratory distress. She has no wheezes. She has no rhonchi. She has no rales.   Abdominal: Abdomen is soft. There is no abdominal tenderness. There is no guarding.   Musculoskeletal:         General: No edema.      Cervical back: Neck supple.     Neurological: She is alert and oriented to person, place, and time.   Skin: Skin is warm and dry. Capillary refill takes less than 2 seconds.   Psychiatric: She has a normal mood and affect.         ED Course   Procedures  Labs Reviewed   CBC W/ AUTO DIFFERENTIAL - Abnormal; Notable for the following components:        Result Value    Hemoglobin 11.9 (*)     Hematocrit 34.7 (*)     MPV 9.1 (*)     Immature Grans (Abs) 0.05 (*)     All other components within normal limits   COMPREHENSIVE METABOLIC PANEL - Abnormal; Notable for the following components:    BUN 21 (*)     Albumin 3.4 (*)     ALT 83 (*)     Anion Gap 7 (*)     All other components within normal limits   LIPASE - Abnormal; Notable for the following components:    Lipase 111 (*)     All other components within normal limits   D DIMER, QUANTITATIVE - Abnormal; Notable for the following components:    D-Dimer 0.62 (*)     All other components within normal limits   TROPONIN I     EKG Readings: (Independently Interpreted)   Previous EKG: Compared with most recent EKG Previous EKG Date: 3/5/2024.   NSR at 79 bpm. Normal axis. Low voltage QRS. Normal intervals. No STEMI.       Imaging Results              CTA Chest Non-Coronary (PE Studies) (In process)                                Medications   aluminum-magnesium hydroxide-simethicone 200-200-20 mg/5 mL suspension 30 mL (30 mLs Oral Given 4/26/24 2204)     And   LIDOcaine viscous HCl 2% oral solution 15 mL (15 mLs Oral Given 4/26/24 2204)   iohexoL (OMNIPAQUE 350) injection 100 mL (100 mLs Intravenous Given 4/26/24 2324)     Medical Decision Making  Differential considerations include (in particular order): ACS, Pneumonia, Pneumothorax, PE, Aortic dissection, Pericarditis, Esophageal perforation, GERD    54 year old female with heart burn/chest pain radiating to the back. NO abdominal pain. Feeling better after GI cocktail. Negative CBC. CMP with slightly elevated ALT but normal Tbili, AST. Lipase 111 but not 3x normal for pancreatitis. Also has no abdominal pain. CTA PE negative for PE.    Feeling much better. Will start on a PPI and have f/u with PCP. Return precautions given (worsening chest pain or if she starts developing abdominal pain/nausea/vomiting). She is in agreement.    Amount and/or Complexity of Data  Reviewed  Labs: ordered.  Radiology: ordered.    Risk  OTC drugs.  Prescription drug management.                                      Clinical Impression:  Final diagnoses:  [R07.9] Acute chest pain (Primary)  [K21.9] Gastroesophageal reflux disease, unspecified whether esophagitis present          ED Disposition Condition    Discharge Stable          ED Prescriptions       Medication Sig Dispense Start Date End Date Auth. Provider    esomeprazole (NEXIUM) 40 MG capsule Take 1 capsule (40 mg total) by mouth once daily. 30 capsule 4/27/2024 -- Juancarlos Ervin DO          Follow-up Information       Follow up With Specialties Details Why Contact Info    Claritza Chase NP Internal Medicine Schedule an appointment as soon as possible for a visit in 3 days  [address] [facility]  [address] [place] [id number] [phone]      [facility] - Emergency Dept Emergency Medicine Go to  If symptoms worsen [address] [facility] [address]  [phone]             Juancarlos Ervin DO  04/27/24 0025

## 2024-04-29 LAB
OHS QRS DURATION: 82 MS
OHS QTC CALCULATION: 458 MS

## 2024-05-03 PROBLEM — R45.89 ANXIETY ABOUT HEALTH: Status: ACTIVE | Noted: 2024-05-03

## 2024-05-03 PROBLEM — F99 INSOMNIA DUE TO OTHER MENTAL DISORDER: Status: ACTIVE | Noted: 2019-11-27

## 2024-05-03 PROBLEM — F51.05 INSOMNIA DUE TO OTHER MENTAL DISORDER: Status: ACTIVE | Noted: 2019-11-27

## 2024-06-04 PROBLEM — E03.9 HYPOTHYROIDISM: Chronic | Status: ACTIVE | Noted: 2019-06-19

## 2024-06-27 ENCOUNTER — HOSPITAL ENCOUNTER (EMERGENCY)
Facility: HOSPITAL | Age: 54
Discharge: HOME | End: 2024-06-27
Attending: FAMILY MEDICINE
Payer: MEDICARE

## 2024-06-27 VITALS
DIASTOLIC BLOOD PRESSURE: 75 MMHG | SYSTOLIC BLOOD PRESSURE: 131 MMHG | TEMPERATURE: 98 F | OXYGEN SATURATION: 100 % | WEIGHT: 208.88 LBS | RESPIRATION RATE: 20 BRPM | HEIGHT: 68 IN | BODY MASS INDEX: 31.66 KG/M2 | HEART RATE: 75 BPM

## 2024-06-27 DIAGNOSIS — R00.2 PALPITATIONS: Primary | ICD-10-CM

## 2024-06-27 DIAGNOSIS — R07.9 CHEST PAIN: ICD-10-CM

## 2024-06-27 LAB
ALBUMIN SERPL BCP-MCNC: 3.8 G/DL (ref 3.5–5.2)
ALP SERPL-CCNC: 63 U/L (ref 55–135)
ALT SERPL W/O P-5'-P-CCNC: 14 U/L (ref 10–44)
ANION GAP SERPL CALC-SCNC: 10 MMOL/L (ref 8–16)
AST SERPL-CCNC: 13 U/L (ref 10–40)
BASOPHILS # BLD AUTO: 0.05 K/UL (ref 0–0.2)
BASOPHILS NFR BLD: 0.6 % (ref 0–1.9)
BILIRUB SERPL-MCNC: 0.6 MG/DL (ref 0.1–1)
BNP SERPL-MCNC: 11 PG/ML (ref 0–99)
BUN SERPL-MCNC: 8 MG/DL (ref 6–20)
CALCIUM SERPL-MCNC: 9.6 MG/DL (ref 8.7–10.5)
CHLORIDE SERPL-SCNC: 102 MMOL/L (ref 95–110)
CO2 SERPL-SCNC: 27 MMOL/L (ref 23–29)
CREAT SERPL-MCNC: 0.7 MG/DL (ref 0.5–1.4)
DIFFERENTIAL METHOD BLD: ABNORMAL
EOSINOPHIL # BLD AUTO: 0.2 K/UL (ref 0–0.5)
EOSINOPHIL NFR BLD: 2.3 % (ref 0–8)
ERYTHROCYTE [DISTWIDTH] IN BLOOD BY AUTOMATED COUNT: 12.9 % (ref 11.5–14.5)
EST. GFR  (NO RACE VARIABLE): >60 ML/MIN/1.73 M^2
GLUCOSE SERPL-MCNC: 91 MG/DL (ref 70–110)
HCT VFR BLD AUTO: 39.3 % (ref 37–48.5)
HGB BLD-MCNC: 13.8 G/DL (ref 12–16)
IMM GRANULOCYTES # BLD AUTO: 0.02 K/UL (ref 0–0.04)
IMM GRANULOCYTES NFR BLD AUTO: 0.2 % (ref 0–0.5)
LYMPHOCYTES # BLD AUTO: 2.7 K/UL (ref 1–4.8)
LYMPHOCYTES NFR BLD: 33.2 % (ref 18–48)
MCH RBC QN AUTO: 27.8 PG (ref 27–31)
MCHC RBC AUTO-ENTMCNC: 35.1 G/DL (ref 32–36)
MCV RBC AUTO: 79 FL (ref 82–98)
MONOCYTES # BLD AUTO: 0.4 K/UL (ref 0.3–1)
MONOCYTES NFR BLD: 5.1 % (ref 4–15)
NEUTROPHILS # BLD AUTO: 4.8 K/UL (ref 1.8–7.7)
NEUTROPHILS NFR BLD: 58.6 % (ref 38–73)
NRBC BLD-RTO: 0 /100 WBC
OHS QRS DURATION: 86 MS
OHS QTC CALCULATION: 463 MS
PLATELET # BLD AUTO: 342 K/UL (ref 150–450)
PMV BLD AUTO: 9.2 FL (ref 9.2–12.9)
POTASSIUM SERPL-SCNC: 3.5 MMOL/L (ref 3.5–5.1)
PROT SERPL-MCNC: 7.7 G/DL (ref 6–8.4)
RBC # BLD AUTO: 4.97 M/UL (ref 4–5.4)
SODIUM SERPL-SCNC: 139 MMOL/L (ref 136–145)
TROPONIN I SERPL DL<=0.01 NG/ML-MCNC: <0.006 NG/ML (ref 0–0.03)
WBC # BLD AUTO: 8.22 K/UL (ref 3.9–12.7)

## 2024-06-27 PROCEDURE — 25000003 PHARM REV CODE 250

## 2024-06-27 PROCEDURE — 83880 ASSAY OF NATRIURETIC PEPTIDE: CPT

## 2024-06-27 PROCEDURE — 80053 COMPREHEN METABOLIC PANEL: CPT

## 2024-06-27 PROCEDURE — 93005 ELECTROCARDIOGRAM TRACING: CPT

## 2024-06-27 PROCEDURE — 85025 COMPLETE CBC W/AUTO DIFF WBC: CPT

## 2024-06-27 PROCEDURE — 93010 ELECTROCARDIOGRAM REPORT: CPT | Mod: ,,, | Performed by: INTERNAL MEDICINE

## 2024-06-27 PROCEDURE — 84484 ASSAY OF TROPONIN QUANT: CPT

## 2024-06-27 PROCEDURE — 99285 EMERGENCY DEPT VISIT HI MDM: CPT | Mod: 25

## 2024-06-27 RX ORDER — ASPIRIN 325 MG
325 TABLET ORAL
Status: COMPLETED | OUTPATIENT
Start: 2024-06-27 | End: 2024-06-27

## 2024-06-27 RX ADMIN — ASPIRIN 325 MG ORAL TABLET 325 MG: 325 PILL ORAL at 01:06

## 2024-06-27 NOTE — ED PROVIDER NOTES
Encounter Date: 6/27/2024       History     Chief Complaint   Patient presents with    Palpitations     Pt c/o chest palpitations and chest tightness that started about an hour ago.     This note is dictated on M*Modal word recognition program.  There are word recognition mistakes and grammatical errors that are occasionally missed on review.     Karine Nayak is a 54 y.o. female presents to ER today complaints of palpitations, midsternal chest pressure that started this morning.  Patient reports has a history of hypertension, diabetes, cigarette smoker.  Patient reports over the last 3 weeks she has been trying to quit smoking.  She was recently cut back with a number cigarette she smokes daily.  Patient denies any shortness of breath    The history is provided by the patient.     Review of patient's allergies indicates:   Allergen Reactions    Ozempic [semaglutide]      Chest tightness,visual disturbances, vomiting, headaches, fatigue    Metformin      History of acute renal failure and hemodialysis     Past Medical History:   Diagnosis Date    Abnormal Pap smear of cervix     Acute stress reaction 11/04/2020    Adjustment insomnia 06/19/2019    SUZIE (acute kidney injury) 12/2016    after first gastric sleeve  had to be dialyzed    Bariatric surgery status     Gastric sleeve    Chronic anxiety     COPD (chronic obstructive pulmonary disease)     COVID-19 01/24/2022    COVID-19 virus infection 01/12/2024    Degenerative scoliosis in adult patient     Esophageal spasm 02/15/2018    Essential (primary) hypertension     Fibromyalgia     History of acute renal failure 04/19/2024    After gastric sleeve surgery; required hemodialysis      Hyperlipidemia     Hypothyroidism     JANETTE (iron deficiency anemia)     Inflammatory osteoarthritis     MDD (major depressive disorder), recurrent episode, mild 12/07/2020    Mixed common migraine and muscle contraction headache 11/04/2020    Muscle spasm     OCD (obsessive compulsive  disorder)     MICHELINE on CPAP     3cm     PCOS (polycystic ovarian syndrome)     Personal history of colonic polyps 11/09/2023    Pleuritic chest pain     Prediabetes     PTSD (post-traumatic stress disorder) 11/04/2020    Rectocele     Traumatic perforation of esophagus 11/2015    During original Gastric Sleeve surgery     Past Surgical History:   Procedure Laterality Date    BARIATRIC SURGERY  12/2016    Dr Estevez  Gastric sleeve, max weight 363    BLADDER SUSPENSION  2013    [place]    carpal tunnel Right 10/2022    Dr. Bear, and had injected    COLONOSCOPY  11/09/2023    Tay Davies MD    EPIDURAL STEROID INJECTION INTO LUMBAR SPINE  2016    [place]    KNEE ARTHROSCOPY  03/04/2020    KNEE ARTHROSCOPY W/ MENISCECTOMY Right 11/2014    ANAND Jenkins osteochondroma    LAPAROSCOPIC REVISION OF NOEMI-EN-Y GASTROENTEROSTOMY N/A 11/08/2018    Procedure: REVISION, GASTROENTEROSTOMY, NOEMI-EN-Y, LAPAROSCOPIC;  Surgeon: Vitaliy Hoskins Jr., MD;  Location: [facility] OR [address] [place] [address];  Service: General;  Laterality: N/A;    SLEEVE GASTROPLASTY      TONSILLECTOMY      as child    TOTAL ABDOMINAL HYSTERECTOMY W/ BILATERAL SALPINGOOPHORECTOMY  2013    [place]    TOTAL KNEE ARTHROPLASTY Right 03/2020    ANTIONE Jenkins    TUBAL LIGATION      WISDOM TOOTH EXTRACTION       Family History   Problem Relation Name Age of Onset    Hypertension Mother Jennifer     Rheum arthritis Mother Jennifer     Alcohol abuse Mother Jennifer     Mental illness Father      COPD Father      Lung cancer Father          mesothelioma    No Known Problems Sister      No Known Problems Brother      Katie Parkinson White syndrome Grandchild Roxana Radha     Mitochondrial disorder Grandchild Roxana Radha     Breast cancer Maternal Aunt      Breast cancer Maternal Aunt      Breast cancer Maternal Aunt      Ovarian cancer Maternal Cousin      Breast cancer Paternal Aunt      Colon cancer Neg Hx       Social History     Tobacco Use    Smoking status: Some Days     Current packs/day: 0.25      Average packs/day: 0.3 packs/day for 23.5 years (5.9 ttl pk-yrs)     Types: Cigarettes     Start date: 2001    Smokeless tobacco: Never    Tobacco comments:     hasn't smoke since    Substance Use Topics    Alcohol use: No    Drug use: Yes     Types: Hydrocodone     Review of Systems   Constitutional: Negative.    HENT: Negative.     Eyes: Negative.    Respiratory:  Positive for chest tightness.    Cardiovascular:  Positive for palpitations.   All other systems reviewed and are negative.      Physical Exam     Initial Vitals [06/27/24 1300]   BP Pulse Resp Temp SpO2   130/74 89 20 98.4 °F (36.9 °C) 98 %      MAP       --         Physical Exam    Constitutional: She appears well-developed and well-nourished. She is not diaphoretic. No distress.   HENT:   Head: Normocephalic.   Eyes: Pupils are equal, round, and reactive to light.   Neck: Neck supple.   Normal range of motion.  Cardiovascular:  Regular rhythm.           No murmur heard.  Pulmonary/Chest: Breath sounds normal. No respiratory distress.   Abdominal: Abdomen is soft. She exhibits no distension. There is no rebound and no guarding.   Musculoskeletal:         General: No tenderness or edema.      Cervical back: Normal range of motion and neck supple.     Neurological: She is alert and oriented to person, place, and time. GCS score is 15. GCS eye subscore is 4. GCS verbal subscore is 5. GCS motor subscore is 6.   Skin: Skin is warm. Capillary refill takes less than 2 seconds. No erythema.   Psychiatric: She has a normal mood and affect. Thought content normal.         ED Course   Procedures  Labs Reviewed   CBC W/ AUTO DIFFERENTIAL - Abnormal; Notable for the following components:       Result Value    MCV 79 (*)     All other components within normal limits   COMPREHENSIVE METABOLIC PANEL   TROPONIN I   B-TYPE NATRIURETIC PEPTIDE     EKG Readings: (Independently Interpreted)   Initial Reading: No STEMI. Rhythm: Normal Sinus Rhythm. Heart Rate: 79.  Ectopy: No Ectopy. Axis: Normal. Clinical Impression: Normal Sinus Rhythm     ECG Results              EKG 12-lead (Final result)        Collection Time Result Time QRS Duration OHS QTC Calculation    06/27/24 13:04:28 06/27/24 13:54:20 86 463                     Final result by Interface, [facility] (06/27/24 13:54:28)                   Narrative:    Test Reason : R07.9,    Vent. Rate : 079 BPM     Atrial Rate : 079 BPM     P-R Int : 138 ms          QRS Dur : 086 ms      QT Int : 404 ms       P-R-T Axes : 083 066 060 degrees     QTc Int : 463 ms    Normal sinus rhythm  Nonspecific ST and T wave abnormality  Abnormal ECG  When compared with ECG of 26-APR-2024 21:51,  Questionable change in The axis  Nonspecific T wave abnormality now evident in Anterior leads  Confirmed by BLAZE MCGRATH MD ([id number]) on 6/27/2024 1:54:14 PM    Referred By: [email or web address]   SELF           Confirmed By:BLAZE MCGRATH MD                                  Imaging Results              X-Ray Chest AP Portable (Final result)  Result time 06/27/24 13:51:03      Final result by Piedad Rodriguez MD (06/27/24 13:51:03)                   Impression:      As above.      Electronically signed by: Piedad Rodriguez MD  Date:    06/27/2024  Time:    13:51               Narrative:    EXAMINATION:  XR CHEST AP PORTABLE    CLINICAL HISTORY:  Chest Pain;    TECHNIQUE:  Single frontal view of the chest was performed.    COMPARISON:  03/05/2024    FINDINGS:  Blunting of the left costophrenic sulcus which could suggest pleural fluid versus pleural thickening.  No consolidation or pleural effusions.  Heart size is normal.  Skeletal structures are intact.                                       Medications   aspirin tablet 325 mg (325 mg Oral Given 6/27/24 1306)     Medical Decision Making  Differential diagnosis include pneumonia, bronchitis, costochondritis, pleurisy, anxiety, nicotine withdrawal, ACS    EKG shows normal sinus rhythm   Troponin within normal  limits.  Patient reports chest pain has improved since arriving to ER.  ACS felt less likely at this time.  However I have advised patient to follow-up with cardiology for further evaluation.  Nicotine withdrawal could be playing a role into patient's symptoms.  She recently cut back on the amount of cigarettes she smokes daily.  Vital signs have been hemodynamically stable throughout this ER visit.    Patient stable at time of discharge in no acute distress.  No life-threatening illnesses were found during ER visit today.  Patient was instructed to follow-up with PCP or other recommended specialist within the next 48-72 hours.  Patient was instructed to return to ER immediately for any worsening or concerning symptoms.  All discharge instructions discussed with patient, and patient agrees to comply with discharge instructions given today.     Amount and/or Complexity of Data Reviewed  Labs: ordered.  Radiology: ordered.    Risk  OTC drugs.                                      Clinical Impression:  Final diagnoses:  [R07.9] Chest pain  [R00.2] Palpitations (Primary)          ED Disposition Condition    Discharge Stable          ED Prescriptions    None       Follow-up Information       Follow up With Specialties Details Why Contact Info    Jonathan Rausch MD Cardiology Schedule an appointment as soon as possible for a visit in 3 days  [address] [place] [address]  [phone]               Alfredo Kim, NP  06/27/24 0635

## 2024-06-27 NOTE — DISCHARGE INSTRUCTIONS
Please follow-up with cardiologist as soon as you can within next week.  Please do not hesitate to return to ER if you feel any worse than you did when you came in today.  Your laboratory values, EKG were within normal limits today.  Your chest x-ray was negative for pneumonia.

## 2024-07-22 PROBLEM — Z87.448 HISTORY OF ACUTE RENAL FAILURE: Status: RESOLVED | Noted: 2024-04-19 | Resolved: 2024-07-22

## 2024-09-20 ENCOUNTER — ANESTHESIA EVENT (OUTPATIENT)
Dept: SURGERY | Facility: HOSPITAL | Age: 54
End: 2024-09-20
Payer: MEDICARE

## 2024-09-20 ENCOUNTER — ANESTHESIA (OUTPATIENT)
Dept: SURGERY | Facility: HOSPITAL | Age: 54
End: 2024-09-20
Payer: MEDICARE

## 2024-09-20 ENCOUNTER — HOSPITAL ENCOUNTER (OUTPATIENT)
Facility: HOSPITAL | Age: 54
Discharge: HOME | End: 2024-09-20
Attending: STUDENT IN AN ORGANIZED HEALTH CARE EDUCATION/TRAINING PROGRAM | Admitting: SURGERY
Payer: MEDICARE

## 2024-09-20 VITALS
WEIGHT: 198.06 LBS | DIASTOLIC BLOOD PRESSURE: 77 MMHG | SYSTOLIC BLOOD PRESSURE: 117 MMHG | BODY MASS INDEX: 30.02 KG/M2 | HEIGHT: 68 IN | OXYGEN SATURATION: 95 % | HEART RATE: 83 BPM | RESPIRATION RATE: 16 BRPM | TEMPERATURE: 98 F

## 2024-09-20 DIAGNOSIS — K81.0 ACUTE CHOLECYSTITIS: Primary | ICD-10-CM

## 2024-09-20 LAB
ALBUMIN SERPL BCP-MCNC: 3.7 G/DL (ref 3.5–5.2)
ALP SERPL-CCNC: 142 U/L (ref 55–135)
ALT SERPL W/O P-5'-P-CCNC: 63 U/L (ref 10–44)
ANION GAP SERPL CALC-SCNC: 14 MMOL/L (ref 8–16)
AST SERPL-CCNC: 33 U/L (ref 10–40)
BASOPHILS # BLD AUTO: 0.03 K/UL (ref 0–0.2)
BASOPHILS NFR BLD: 0.4 % (ref 0–1.9)
BILIRUB SERPL-MCNC: 0.7 MG/DL (ref 0.1–1)
BILIRUB UR QL STRIP: NEGATIVE
BUN SERPL-MCNC: 5 MG/DL (ref 6–20)
CALCIUM SERPL-MCNC: 9.5 MG/DL (ref 8.7–10.5)
CHLORIDE SERPL-SCNC: 102 MMOL/L (ref 95–110)
CLARITY UR: CLEAR
CO2 SERPL-SCNC: 23 MMOL/L (ref 23–29)
COLOR UR: YELLOW
CREAT SERPL-MCNC: 0.7 MG/DL (ref 0.5–1.4)
DIFFERENTIAL METHOD BLD: ABNORMAL
EOSINOPHIL # BLD AUTO: 0.3 K/UL (ref 0–0.5)
EOSINOPHIL NFR BLD: 4.2 % (ref 0–8)
ERYTHROCYTE [DISTWIDTH] IN BLOOD BY AUTOMATED COUNT: 13.9 % (ref 11.5–14.5)
EST. GFR  (NO RACE VARIABLE): >60 ML/MIN/1.73 M^2
GLUCOSE SERPL-MCNC: 95 MG/DL (ref 70–110)
GLUCOSE UR QL STRIP: NEGATIVE
HCT VFR BLD AUTO: 39.9 % (ref 37–48.5)
HGB BLD-MCNC: 14.1 G/DL (ref 12–16)
HGB UR QL STRIP: NEGATIVE
IMM GRANULOCYTES # BLD AUTO: 0.02 K/UL (ref 0–0.04)
IMM GRANULOCYTES NFR BLD AUTO: 0.3 % (ref 0–0.5)
KETONES UR QL STRIP: ABNORMAL
LEUKOCYTE ESTERASE UR QL STRIP: NEGATIVE
LIPASE SERPL-CCNC: 12 U/L (ref 4–60)
LYMPHOCYTES # BLD AUTO: 2.7 K/UL (ref 1–4.8)
LYMPHOCYTES NFR BLD: 37.9 % (ref 18–48)
MCH RBC QN AUTO: 28.2 PG (ref 27–31)
MCHC RBC AUTO-ENTMCNC: 35.3 G/DL (ref 32–36)
MCV RBC AUTO: 80 FL (ref 82–98)
MONOCYTES # BLD AUTO: 0.5 K/UL (ref 0.3–1)
MONOCYTES NFR BLD: 7.2 % (ref 4–15)
NEUTROPHILS # BLD AUTO: 3.6 K/UL (ref 1.8–7.7)
NEUTROPHILS NFR BLD: 50 % (ref 38–73)
NITRITE UR QL STRIP: NEGATIVE
NRBC BLD-RTO: 0 /100 WBC
PH UR STRIP: 7 [PH] (ref 5–8)
PLATELET # BLD AUTO: 320 K/UL (ref 150–450)
PMV BLD AUTO: 8.9 FL (ref 9.2–12.9)
POTASSIUM SERPL-SCNC: 3.3 MMOL/L (ref 3.5–5.1)
PROT SERPL-MCNC: 7.5 G/DL (ref 6–8.4)
PROT UR QL STRIP: NEGATIVE
RBC # BLD AUTO: 5 M/UL (ref 4–5.4)
SODIUM SERPL-SCNC: 139 MMOL/L (ref 136–145)
SP GR UR STRIP: 1.01 (ref 1–1.03)
URN SPEC COLLECT METH UR: ABNORMAL
UROBILINOGEN UR STRIP-ACNC: 1 EU/DL
WBC # BLD AUTO: 7.2 K/UL (ref 3.9–12.7)

## 2024-09-20 PROCEDURE — 37000009 HC ANESTHESIA EA ADD 15 MINS: Performed by: SURGERY

## 2024-09-20 PROCEDURE — 81003 URINALYSIS AUTO W/O SCOPE: CPT | Performed by: STUDENT IN AN ORGANIZED HEALTH CARE EDUCATION/TRAINING PROGRAM

## 2024-09-20 PROCEDURE — 37000008 HC ANESTHESIA 1ST 15 MINUTES: Performed by: SURGERY

## 2024-09-20 PROCEDURE — 63600175 PHARM REV CODE 636 W HCPCS: Performed by: SURGERY

## 2024-09-20 PROCEDURE — 88304 TISSUE EXAM BY PATHOLOGIST: CPT | Mod: 26,,, | Performed by: PATHOLOGY

## 2024-09-20 PROCEDURE — 94760 N-INVAS EAR/PLS OXIMETRY 1: CPT

## 2024-09-20 PROCEDURE — 80053 COMPREHEN METABOLIC PANEL: CPT | Performed by: STUDENT IN AN ORGANIZED HEALTH CARE EDUCATION/TRAINING PROGRAM

## 2024-09-20 PROCEDURE — 25000003 PHARM REV CODE 250: Performed by: SURGERY

## 2024-09-20 PROCEDURE — 96376 TX/PRO/DX INJ SAME DRUG ADON: CPT

## 2024-09-20 PROCEDURE — 88304 TISSUE EXAM BY PATHOLOGIST: CPT | Performed by: PATHOLOGY

## 2024-09-20 PROCEDURE — 36000708 HC OR TIME LEV III 1ST 15 MIN: Performed by: SURGERY

## 2024-09-20 PROCEDURE — 25000003 PHARM REV CODE 250: Performed by: NURSE ANESTHETIST, CERTIFIED REGISTERED

## 2024-09-20 PROCEDURE — 99285 EMERGENCY DEPT VISIT HI MDM: CPT | Mod: 25

## 2024-09-20 PROCEDURE — 96361 HYDRATE IV INFUSION ADD-ON: CPT

## 2024-09-20 PROCEDURE — 71000033 HC RECOVERY, INTIAL HOUR: Performed by: SURGERY

## 2024-09-20 PROCEDURE — 63600175 PHARM REV CODE 636 W HCPCS: Performed by: NURSE ANESTHETIST, CERTIFIED REGISTERED

## 2024-09-20 PROCEDURE — 63600175 PHARM REV CODE 636 W HCPCS: Performed by: STUDENT IN AN ORGANIZED HEALTH CARE EDUCATION/TRAINING PROGRAM

## 2024-09-20 PROCEDURE — G0378 HOSPITAL OBSERVATION PER HR: HCPCS

## 2024-09-20 PROCEDURE — 85025 COMPLETE CBC W/AUTO DIFF WBC: CPT | Performed by: STUDENT IN AN ORGANIZED HEALTH CARE EDUCATION/TRAINING PROGRAM

## 2024-09-20 PROCEDURE — 96365 THER/PROPH/DIAG IV INF INIT: CPT

## 2024-09-20 PROCEDURE — 83690 ASSAY OF LIPASE: CPT | Performed by: STUDENT IN AN ORGANIZED HEALTH CARE EDUCATION/TRAINING PROGRAM

## 2024-09-20 PROCEDURE — 36000709 HC OR TIME LEV III EA ADD 15 MIN: Performed by: SURGERY

## 2024-09-20 PROCEDURE — 96375 TX/PRO/DX INJ NEW DRUG ADDON: CPT

## 2024-09-20 PROCEDURE — 27201423 OPTIME MED/SURG SUP & DEVICES STERILE SUPPLY: Performed by: SURGERY

## 2024-09-20 PROCEDURE — 71000039 HC RECOVERY, EACH ADD'L HOUR: Performed by: SURGERY

## 2024-09-20 PROCEDURE — 25500020 PHARM REV CODE 255: Performed by: STUDENT IN AN ORGANIZED HEALTH CARE EDUCATION/TRAINING PROGRAM

## 2024-09-20 PROCEDURE — 25000003 PHARM REV CODE 250: Performed by: STUDENT IN AN ORGANIZED HEALTH CARE EDUCATION/TRAINING PROGRAM

## 2024-09-20 RX ORDER — KETOROLAC TROMETHAMINE 30 MG/ML
INJECTION, SOLUTION INTRAMUSCULAR; INTRAVENOUS
Status: DISCONTINUED | OUTPATIENT
Start: 2024-09-20 | End: 2024-09-20

## 2024-09-20 RX ORDER — SODIUM CHLORIDE, SODIUM LACTATE, POTASSIUM CHLORIDE, CALCIUM CHLORIDE 600; 310; 30; 20 MG/100ML; MG/100ML; MG/100ML; MG/100ML
INJECTION, SOLUTION INTRAVENOUS CONTINUOUS PRN
Status: DISCONTINUED | OUTPATIENT
Start: 2024-09-20 | End: 2024-09-20

## 2024-09-20 RX ORDER — SODIUM CHLORIDE 0.9 % (FLUSH) 0.9 %
10 SYRINGE (ML) INJECTION
Status: DISCONTINUED | OUTPATIENT
Start: 2024-09-20 | End: 2024-09-20 | Stop reason: HOSPADM

## 2024-09-20 RX ORDER — HYDROMORPHONE HYDROCHLORIDE 1 MG/ML
1 INJECTION, SOLUTION INTRAMUSCULAR; INTRAVENOUS; SUBCUTANEOUS
Status: DISCONTINUED | OUTPATIENT
Start: 2024-09-20 | End: 2024-09-20

## 2024-09-20 RX ORDER — LIDOCAINE HYDROCHLORIDE 20 MG/ML
INJECTION, SOLUTION EPIDURAL; INFILTRATION; INTRACAUDAL; PERINEURAL
Status: DISCONTINUED | OUTPATIENT
Start: 2024-09-20 | End: 2024-09-20

## 2024-09-20 RX ORDER — BUPIVACAINE HYDROCHLORIDE AND EPINEPHRINE 5; 5 MG/ML; UG/ML
INJECTION, SOLUTION EPIDURAL; INTRACAUDAL; PERINEURAL
Status: DISCONTINUED | OUTPATIENT
Start: 2024-09-20 | End: 2024-09-20 | Stop reason: HOSPADM

## 2024-09-20 RX ORDER — ONDANSETRON HYDROCHLORIDE 2 MG/ML
INJECTION, SOLUTION INTRAMUSCULAR; INTRAVENOUS
Status: DISCONTINUED | OUTPATIENT
Start: 2024-09-20 | End: 2024-09-20

## 2024-09-20 RX ORDER — ALUMINUM HYDROXIDE, MAGNESIUM HYDROXIDE, AND SIMETHICONE 1200; 120; 1200 MG/30ML; MG/30ML; MG/30ML
30 SUSPENSION ORAL ONCE
Status: COMPLETED | OUTPATIENT
Start: 2024-09-20 | End: 2024-09-20

## 2024-09-20 RX ORDER — ONDANSETRON HYDROCHLORIDE 2 MG/ML
4 INJECTION, SOLUTION INTRAVENOUS
Status: COMPLETED | OUTPATIENT
Start: 2024-09-20 | End: 2024-09-20

## 2024-09-20 RX ORDER — HYDROCODONE BITARTRATE AND ACETAMINOPHEN 10; 325 MG/1; MG/1
1 TABLET ORAL EVERY 4 HOURS PRN
Status: DISCONTINUED | OUTPATIENT
Start: 2024-09-20 | End: 2024-09-20 | Stop reason: HOSPADM

## 2024-09-20 RX ORDER — SODIUM CHLORIDE 9 MG/ML
INJECTION, SOLUTION INTRAVENOUS CONTINUOUS
Status: DISCONTINUED | OUTPATIENT
Start: 2024-09-20 | End: 2024-09-20 | Stop reason: HOSPADM

## 2024-09-20 RX ORDER — HYDROMORPHONE HYDROCHLORIDE 1 MG/ML
1 INJECTION, SOLUTION INTRAMUSCULAR; INTRAVENOUS; SUBCUTANEOUS
Status: COMPLETED | OUTPATIENT
Start: 2024-09-20 | End: 2024-09-20

## 2024-09-20 RX ORDER — ROCURONIUM BROMIDE 10 MG/ML
INJECTION, SOLUTION INTRAVENOUS
Status: DISCONTINUED | OUTPATIENT
Start: 2024-09-20 | End: 2024-09-20

## 2024-09-20 RX ORDER — DEXAMETHASONE SODIUM PHOSPHATE 4 MG/ML
INJECTION, SOLUTION INTRA-ARTICULAR; INTRALESIONAL; INTRAMUSCULAR; INTRAVENOUS; SOFT TISSUE
Status: DISCONTINUED | OUTPATIENT
Start: 2024-09-20 | End: 2024-09-20

## 2024-09-20 RX ORDER — ACETAMINOPHEN 10 MG/ML
INJECTION, SOLUTION INTRAVENOUS
Status: DISCONTINUED | OUTPATIENT
Start: 2024-09-20 | End: 2024-09-20

## 2024-09-20 RX ORDER — HYDROCODONE BITARTRATE AND ACETAMINOPHEN 5; 325 MG/1; MG/1
1 TABLET ORAL EVERY 4 HOURS PRN
Status: DISCONTINUED | OUTPATIENT
Start: 2024-09-20 | End: 2024-09-20 | Stop reason: HOSPADM

## 2024-09-20 RX ORDER — MORPHINE SULFATE 2 MG/ML
4 INJECTION, SOLUTION INTRAMUSCULAR; INTRAVENOUS EVERY 4 HOURS PRN
Status: DISCONTINUED | OUTPATIENT
Start: 2024-09-20 | End: 2024-09-20 | Stop reason: HOSPADM

## 2024-09-20 RX ORDER — ONDANSETRON HYDROCHLORIDE 2 MG/ML
8 INJECTION, SOLUTION INTRAVENOUS
Status: COMPLETED | OUTPATIENT
Start: 2024-09-20 | End: 2024-09-20

## 2024-09-20 RX ORDER — PROPOFOL 10 MG/ML
VIAL (ML) INTRAVENOUS
Status: DISCONTINUED | OUTPATIENT
Start: 2024-09-20 | End: 2024-09-20

## 2024-09-20 RX ORDER — MORPHINE SULFATE 2 MG/ML
4 INJECTION, SOLUTION INTRAMUSCULAR; INTRAVENOUS
Status: COMPLETED | OUTPATIENT
Start: 2024-09-20 | End: 2024-09-20

## 2024-09-20 RX ORDER — LIDOCAINE HYDROCHLORIDE 20 MG/ML
15 SOLUTION OROPHARYNGEAL ONCE
Status: COMPLETED | OUTPATIENT
Start: 2024-09-20 | End: 2024-09-20

## 2024-09-20 RX ORDER — ONDANSETRON HYDROCHLORIDE 2 MG/ML
4 INJECTION, SOLUTION INTRAVENOUS EVERY 8 HOURS PRN
Status: DISCONTINUED | OUTPATIENT
Start: 2024-09-20 | End: 2024-09-20 | Stop reason: HOSPADM

## 2024-09-20 RX ORDER — MIDAZOLAM HYDROCHLORIDE 1 MG/ML
INJECTION INTRAMUSCULAR; INTRAVENOUS
Status: DISCONTINUED | OUTPATIENT
Start: 2024-09-20 | End: 2024-09-20

## 2024-09-20 RX ORDER — TALC
6 POWDER (GRAM) TOPICAL NIGHTLY PRN
Status: DISCONTINUED | OUTPATIENT
Start: 2024-09-20 | End: 2024-09-20 | Stop reason: HOSPADM

## 2024-09-20 RX ORDER — FENTANYL CITRATE 50 UG/ML
INJECTION, SOLUTION INTRAMUSCULAR; INTRAVENOUS
Status: DISCONTINUED | OUTPATIENT
Start: 2024-09-20 | End: 2024-09-20

## 2024-09-20 RX ORDER — HYDROCODONE BITARTRATE AND ACETAMINOPHEN 5; 325 MG/1; MG/1
1 TABLET ORAL EVERY 4 HOURS PRN
Qty: 30 TABLET | Refills: 0 | Status: SHIPPED | OUTPATIENT
Start: 2024-09-20

## 2024-09-20 RX ADMIN — CEFOXITIN 1 G: 1 INJECTION, POWDER, FOR SOLUTION INTRAVENOUS at 08:09

## 2024-09-20 RX ADMIN — DEXAMETHASONE SODIUM PHOSPHATE 4 MG: 4 INJECTION, SOLUTION INTRAMUSCULAR; INTRAVENOUS at 10:09

## 2024-09-20 RX ADMIN — HYDROCODONE BITARTRATE AND ACETAMINOPHEN 1 TABLET: 10; 325 TABLET ORAL at 12:09

## 2024-09-20 RX ADMIN — HYDROMORPHONE HYDROCHLORIDE 1 MG: 1 INJECTION, SOLUTION INTRAMUSCULAR; INTRAVENOUS; SUBCUTANEOUS at 07:09

## 2024-09-20 RX ADMIN — SODIUM CHLORIDE, SODIUM LACTATE, POTASSIUM CHLORIDE, AND CALCIUM CHLORIDE: .6; .31; .03; .02 INJECTION, SOLUTION INTRAVENOUS at 10:09

## 2024-09-20 RX ADMIN — SODIUM CHLORIDE, SODIUM LACTATE, POTASSIUM CHLORIDE, AND CALCIUM CHLORIDE: .6; .31; .03; .02 INJECTION, SOLUTION INTRAVENOUS at 09:09

## 2024-09-20 RX ADMIN — GLYCOPYRROLATE 0.2 MG: 0.2 INJECTION, SOLUTION INTRAMUSCULAR; INTRAVENOUS at 10:09

## 2024-09-20 RX ADMIN — ROCURONIUM BROMIDE 5 MG: 10 INJECTION, SOLUTION INTRAVENOUS at 10:09

## 2024-09-20 RX ADMIN — ONDANSETRON 8 MG: 2 INJECTION INTRAMUSCULAR; INTRAVENOUS at 03:09

## 2024-09-20 RX ADMIN — FENTANYL CITRATE 100 MCG: 0.05 INJECTION, SOLUTION INTRAMUSCULAR; INTRAVENOUS at 10:09

## 2024-09-20 RX ADMIN — IOHEXOL 100 ML: 350 INJECTION, SOLUTION INTRAVENOUS at 04:09

## 2024-09-20 RX ADMIN — MORPHINE SULFATE 4 MG: 2 INJECTION, SOLUTION INTRAMUSCULAR; INTRAVENOUS at 06:09

## 2024-09-20 RX ADMIN — ONDANSETRON 4 MG: 2 INJECTION INTRAMUSCULAR; INTRAVENOUS at 10:09

## 2024-09-20 RX ADMIN — LIDOCAINE HYDROCHLORIDE 15 ML: 20 SOLUTION ORAL at 03:09

## 2024-09-20 RX ADMIN — LIDOCAINE HYDROCHLORIDE 80 MG: 20 INJECTION, SOLUTION EPIDURAL; INFILTRATION; INTRACAUDAL; PERINEURAL at 10:09

## 2024-09-20 RX ADMIN — ACETAMINOPHEN 1000 MG: 10 INJECTION, SOLUTION INTRAVENOUS at 10:09

## 2024-09-20 RX ADMIN — SUGAMMADEX 200 MG: 100 INJECTION, SOLUTION INTRAVENOUS at 10:09

## 2024-09-20 RX ADMIN — ONDANSETRON 4 MG: 2 INJECTION INTRAMUSCULAR; INTRAVENOUS at 06:09

## 2024-09-20 RX ADMIN — ALUMINUM HYDROXIDE, MAGNESIUM HYDROXIDE, AND SIMETHICONE 30 ML: 200; 200; 20 SUSPENSION ORAL at 03:09

## 2024-09-20 RX ADMIN — KETOROLAC TROMETHAMINE 30 MG: 30 INJECTION, SOLUTION INTRAMUSCULAR at 10:09

## 2024-09-20 RX ADMIN — SODIUM CHLORIDE, POTASSIUM CHLORIDE, SODIUM LACTATE AND CALCIUM CHLORIDE 1000 ML: 600; 310; 30; 20 INJECTION, SOLUTION INTRAVENOUS at 03:09

## 2024-09-20 RX ADMIN — ROCURONIUM BROMIDE 30 MG: 10 INJECTION, SOLUTION INTRAVENOUS at 10:09

## 2024-09-20 RX ADMIN — PROPOFOL 150 MG: 10 INJECTION, EMULSION INTRAVENOUS at 10:09

## 2024-09-20 RX ADMIN — SODIUM CHLORIDE 1000 ML: 9 INJECTION, SOLUTION INTRAVENOUS at 07:09

## 2024-09-20 RX ADMIN — MIDAZOLAM HYDROCHLORIDE 2 MG: 1 INJECTION, SOLUTION INTRAMUSCULAR; INTRAVENOUS at 09:09

## 2024-09-20 NOTE — ANESTHESIA PROCEDURE NOTES
Intubation    Date/Time: 9/20/2024 10:04 AM    Performed by: Elijah Aguilera CRNA  Authorized by: Elijah Aguilera CRNA    Intubation:     Induction:  Rapid sequence induction    Intubated:  Postinduction    Mask Ventilation:  N/a    Attempts:  1    Attempted By:  CRNA    Method of Intubation:  Video laryngoscopy    Blade:  Baker 3    Laryngeal View Grade: Grade I - full view of cords      Difficult Airway Encountered?: No      Complications:  None    Airway Device:  Oral endotracheal tube    Airway Device Size:  7.0    Style/Cuff Inflation:  Cuffed (inflated to minimal occlusive pressure)    Tube secured:  21    Secured at:  The lips    Placement Verified By:  Capnometry    Complicating Factors:  None    Findings Post-Intubation:  BS equal bilateral and atraumatic/condition of teeth unchanged

## 2024-09-20 NOTE — ED TRIAGE NOTES
"Pt states she has been having nausea and "burning in her abdomen" for several days.  Pt denies vomiting,  pain worsens when eating.  Pt took a Nexium PTA.  VSS, Aao.  No S&S of distress.   "

## 2024-09-20 NOTE — ANESTHESIA PREPROCEDURE EVALUATION
[facility]  Anesthesia Pre-Operative Evaluation         Patient Name: Karine Nayak  YOB: 1970  MRN: [id number]    SUBJECTIVE:          09/20/2024    Karine Nayak is a 54 y.o. female w/ a significant PMHx of MICHELINE on CPAP, fibromyalgia, HTN, obesity s/p gastric bypass with acute Cholecystitis.    Patient now presents for the above procedure(s).    LDA: None documented.     Prev airway: None documented.    Drips: None documented.      Patient Active Problem List   Diagnosis    Fibromyalgia syndrome    Pleuritic chest pain    Neuropathic pain of chest    Pleural thickening    Esophageal spasm    OCD (obsessive compulsive disorder)    Inflammatory osteoarthritis    Chronic obstructive pulmonary disease    Iron deficiency anemia    Rectocele    Degenerative scoliosis in adult patient    Hypothyroidism with MNG    PCOS (polycystic ovarian syndrome)    Prediabetes    Gastroesophageal reflux disease with esophagitis without hemorrhage    Perimenopausal vasomotor symptoms    Chronic pain syndrome    Vitamin D deficiency    Anemia of decreased vitamin B12 absorption    Adjustment insomnia    Chronic anxiety    Insomnia due to other mental disorder    Essential (primary) hypertension    Bariatric surgery status    Acute stress reaction    PTSD (post-traumatic stress disorder)    Mixed common migraine and muscle contraction headache    Multiple system degeneration of autonomic nervous system    MDD (major depressive disorder), recurrent episode, mild    Chronic interstitial nephritis    Hormone replacement therapy (HRT)    Rheumatoid arthritis    Sickle-cell trait    Pure hypercholesterolemia    Weight gain following gastric bypass surgery    Insulin resistance syndrome    Labile blood glucose    Metabolic syndrome    Anxiety about health       Review of patient's allergies indicates:  No Known Allergies    Current Inpatient Medications:      No current facility-administered medications on file prior  to encounter.     Current Outpatient Medications on File Prior to Encounter   Medication Sig Dispense Refill    amLODIPine (NORVASC) 10 MG tablet TAKE 1 TABLET BY MOUTH ONCE  DAILY 90 tablet 3    ARIPiprazole (ABILIFY) 5 MG Tab TAKE 1 TABLET BY MOUTH ONCE  DAILY 90 tablet 3    aspirin (ADULT LOW DOSE ASPIRIN) 81 MG EC tablet as directed Orally      calcium carbonate (CALTRATE 600 ORAL) Take by mouth once daily.      esomeprazole (NEXIUM) 40 MG capsule Take 1 capsule (40 mg total) by mouth once daily. 30 capsule 11    hydroCHLOROthiazide (HYDRODIURIL) 12.5 MG Tab Take 1 tablet by mouth daily 90 tablet 3    multivitamin (THERAGRAN) per tablet Take 1 tablet by mouth once daily. 90 tablet 3    potassium chloride SA (K-DUR,KLOR-CON M) 10 MEQ tablet Take 1 tablet (10 mEq total) by mouth once daily. 90 tablet 3    sertraline (ZOLOFT) 100 MG tablet Take 1 tablet (100 mg total) by mouth once daily. 90 tablet 3    VITAMIN D2 1,250 mcg (50,000 unit) capsule TAKE 1 CAPSULE BY MOUTH EVERY 7  DAYS 12 capsule 3    acetaminophen (TYLENOL) 325 MG tablet TAKE 1 TABLET 6 HOURS AS NEEDED FOR PAIN (HEADACHES). 90 tablet 3    albuterol (PROAIR HFA) 90 mcg/actuation inhaler Inhale 1-2 puffs into the lungs every 6 (six) hours as needed for Wheezing or Shortness of Breath (cough). Rescue 18 g 11    ALPRAZolam (XANAX) 1 MG tablet Take 1 tablet (1 mg total) by mouth 3 (three) times daily as needed for Anxiety. 85 tablet 5    cyclobenzaprine (FLEXERIL) 5 MG tablet Take 1 tablet (5 mg total) by mouth nightly as needed for Muscle spasms. 30 tablet 2    MOUNJARO 7.5 mg/0.5 mL PnIj SMARTSI.5 Milligram(s) SUB-Q Once a Week      promethazine-dextromethorphan (PROMETHAZINE-DM) 6.25-15 mg/5 mL Syrp Take 5 mLs by mouth every 8 (eight) hours as needed (cough and congestion). 120 mL 2       Past Surgical History:   Procedure Laterality Date    BARIATRIC SURGERY  2016    Dr Estevez  Gastric sleeve, max weight 363    BLADDER SUSPENSION      [place]     carpal tunnel Right 10/2022    Dr. Bear, and had injected    COLONOSCOPY  2023    Tay Davies MD    EPIDURAL STEROID INJECTION INTO LUMBAR SPINE  2016    [place]    KNEE ARTHROSCOPY  2020    KNEE ARTHROSCOPY W/ MENISCECTOMY Right 2014    ANAND Jenkins osteochondroma    LAPAROSCOPIC REVISION OF NOEMI-EN-Y GASTROENTEROSTOMY N/A 2018    Procedure: REVISION, GASTROENTEROSTOMY, NOEMI-EN-Y, LAPAROSCOPIC;  Surgeon: Vitaliy Hoskins Jr., MD;  Location: [facility] OR [address] [place] [address];  Service: General;  Laterality: N/A;    SLEEVE GASTROPLASTY      TONSILLECTOMY      as child    TOTAL ABDOMINAL HYSTERECTOMY W/ BILATERAL SALPINGOOPHORECTOMY      Good    TOTAL KNEE ARTHROPLASTY Right 2020    ANTIONE Jenkins    TUBAL LIGATION      WISDOM TOOTH EXTRACTION         Social History     Socioeconomic History    Marital status: Legally      Spouse name: Jimbo    Number of children: 2    Highest education level: Associate degree: occupational, technical, or vocational program   Occupational History    Occupation: Disabled CNA     Employer: Disabled   Tobacco Use    Smoking status: Some Days     Current packs/day: 0.25     Average packs/day: 0.3 packs/day for 23.7 years (5.9 ttl pk-yrs)     Types: Cigarettes     Start date:     Smokeless tobacco: Never    Tobacco comments:     hasn't smoke since    Substance and Sexual Activity    Alcohol use: No    Drug use: Yes     Types: Hydrocodone    Sexual activity: Yes     Partners: Male     Birth control/protection: See Surgical Hx     Comment: s/p hysterectomy   Social History Narrative    .  Disability due to fibromyalgia.   [place]  2 adult children (college).       Social Determinants of Health     Financial Resource Strain: High Risk (2024)    Overall Financial Resource Strain (CARDIA)     Difficulty of Paying Living Expenses: Very hard   Food Insecurity: No Food Insecurity (2024)    Hunger Vital Sign     Worried About  Running Out of Food in the Last Year: Never true     Ran Out of Food in the Last Year: Never true   Transportation Needs: No Transportation Needs (8/13/2024)    PRAPARE - Transportation     Lack of Transportation (Medical): No     Lack of Transportation (Non-Medical): No   Physical Activity: Insufficiently Active (8/13/2024)    Exercise Vital Sign     Days of Exercise per Week: 2 days     Minutes of Exercise per Session: 30 min   Stress: Stress Concern Present (8/13/2024)    [facility] [place] of Occupational Health - Occupational Stress Questionnaire     Feeling of Stress : To some extent   Housing Stability: Low Risk  (8/13/2024)    Housing Stability Vital Sign     Unable to Pay for Housing in the Last Year: No     Homeless in the Last Year: No       OBJECTIVE:     Vital Signs Range (Last 24H):  Temp:  [36.3 °C (97.3 °F)-37.1 °C (98.7 °F)]   Pulse:  []   Resp:  [16-18]   BP: (113-167)/(66-84)   SpO2:  [95 %-100 %]       Significant Labs:  Lab Results   Component Value Date    WBC 7.20 09/20/2024    HGB 14.1 09/20/2024    HCT 39.9 09/20/2024     09/20/2024    CHOL 248 (H) 02/15/2024    TRIG 61 02/15/2024    HDL 85 (H) 02/15/2024    ALT 63 (H) 09/20/2024    AST 33 09/20/2024     09/20/2024    K 3.3 (L) 09/20/2024     09/20/2024    CREATININE 0.7 09/20/2024    BUN 5 (L) 09/20/2024    CO2 23 09/20/2024    TSH 1.01 02/15/2024    HGBA1C 5.1 02/15/2024       Diagnostic Studies: No relevant studies.    EKG:   Vent. Rate : 085 BPM     Atrial Rate : 085 BPM     P-R Int : 126 ms          QRS Dur : 082 ms      QT Int : 382 ms       P-R-T Axes : 064 036 029 degrees     QTc Int : 454 ms    Normal sinus rhythm  Low voltage QRS in the precordial leads  Borderline Abnormal ECG  When compared with ECG of 19-FEB-2018 10:03,  No significant change was found  Confirmed by Shelly Brasher MD ([id number]) on 10/24/2018 3:24:22 PM    2D ECHO:  No results found for this or any previous visit.      ASSESSMENT/PLAN:        Anesthesia Evaluation    I have reviewed the Patient Summary Reports.     I have reviewed the Nursing Notes. I have reviewed the NPO Status.   I have reviewed the Medications.     Review of Systems  Anesthesia Hx:  No problems with previous Anesthesia   History of prior surgery of interest to airway management or planning:  Previous anesthesia: General           Social:  Former Smoker, Social Alcohol Use       Hematology/Oncology:  Hematology Normal   Oncology Normal                                   EENT/Dental:  EENT/Dental Normal           Cardiovascular:  Exercise tolerance: good   Hypertension, well controlled                                        Pulmonary:   COPD, mild     Sleep Apnea, CPAP                Renal/:  Chronic Renal Disease                Hepatic/GI:     GERD, well controlled   Hx gastric bypass surgery, on mounjaro.           Musculoskeletal:  Arthritis               Neurological:    Neuromuscular Disease,  Headaches                                 Endocrine:   Hypothyroidism          Dermatological:  Skin Normal    Psych:  Psychiatric History                  Physical Exam  General:  Obesity       Airway/Jaw/Neck:  Airway Findings: Mouth Opening: Normal     Tongue: Normal      General Airway Assessment: Adult      Mallampati: II   TM Distance: Normal, at least 6 cm   Jaw/Neck Findings:     Neck ROM: Normal ROM          Dental:  Dental Findings: In tact           Mental Status:  Mental Status Findings:  Cooperative, Alert and Oriented         Anesthesia Plan  Type of Anesthesia, risks & benefits discussed:  Anesthesia Type:  general    Patient's Preference: GA  Plan Factors:          Intra-op Monitoring Plan: standard ASA monitors  Intra-op Monitoring Plan Comments:   Post Op Pain Control Plan: multimodal analgesia, IV/PO Opioids PRN and per primary service following discharge from PACU  Post Op Pain Control Plan Comments:     Induction:   IV and rapid sequence  Beta Blocker:  Patient is  not currently on a Beta-Blocker (No further documentation required).       Informed Consent: Informed consent signed with the Patient and all parties understand the risks and agree with anesthesia plan.  All questions answered.  Anesthesia consent signed with patient.  ASA Score: 3     Day of Surgery Review of History & Physical: I have interviewed and examined the patient. I have reviewed the patient's H&P dated: 9/20/24.  There are no significant changes.  Significant changes noted.      Anesthesia Plan Notes: Pt on mounjaro, last dose 6 days ago with hx of gastric bypass sx. Will proceed with E case under RSI GETA        Ready For Surgery From Anesthesia Perspective.             Physical Exam  General: Obesity    Airway:  Mallampati: II   Mouth Opening: Normal  TM Distance: Normal, at least 6 cm  Tongue: Normal  Neck ROM: Normal ROM    Dental:  In tact        Anesthesia Plan  Type of Anesthesia, risks & benefits discussed:    Anesthesia Type: general  Intra-op Monitoring Plan: standard ASA monitors  Post Op Pain Control Plan: multimodal analgesia, IV/PO Opioids PRN and per primary service following discharge from PACU  Induction:  IV and rapid sequence  Informed Consent: Informed consent signed with the Patient and all parties understand the risks and agree with anesthesia plan.  All questions answered.   ASA Score: 3  Day of Surgery Review of History & Physical: I have interviewed and examined the patient. I have reviewed the patient's H&P dated: 9/20/24.  Significant changes noted.   Anesthesia Plan Notes: Pt on mounjaro, last dose 6 days ago with hx of gastric bypass sx. Will proceed with E case under RSI GETA    Ready For Surgery From Anesthesia Perspective.     .

## 2024-09-20 NOTE — OP NOTE
[place] - Surgery  Surgery Department  Operative Note    SUMMARY     Date of Procedure: 9/20/2024     Procedure: Procedure(s) (LRB):  CHOLECYSTECTOMY, LAPAROSCOPIC (N/A)     Surgeons and Role:     * Ezio Smith MD - Primary    Assisting Surgeon: None    Pre-Operative Diagnosis: Acute cholecystitis [K81.0]    Post-Operative Diagnosis: Post-Op Diagnosis Codes:     * Acute cholecystitis [K81.0]    Anesthesia: General    Estimated Blood Loss (EBL): * No values recorded between 9/20/2024 10:15 AM and 9/20/2024 11:04 AM *           Operative Findings:     Critical View obtained    Acute cholecystitis  Surgicel left in gallbladder fossa    Indications for Procedure:  55y/o female with acute cholecystitis is presenting for cholecystectomy    Procedure in Detail: After informed consent was obtained, the pt was taking to the operating room, placed supine on the operating table, and administered general endotracheal anesthesia.  He was also administered the appropriate pre-operative antibiotics, had SCDs in place, and then was prepped/draped in the usual sterile fashion.  An umbilical incision was made with a #11 blade after the area had been anesthetized with 0.5% Marcaine with epinephrine.  Dissection down to the anterior abdominal fascia was performed and the umbilical stalk grasped with a towel clamp and elevated.  The peritoneal cavity was entered under direct visualization with a 5mm Optiview trocar and the pneumoperitoneum created. An 11mm trocar and two 5mm trocars were then placed in the epigastrium and two in the right upper quadrant under direct visualization.  The gallbladder was then visualized and found to be grossly inflammed and required needle decompression to allow for manipulation.  The fundas was grasped with a locking grasper and elevated cephelad, while a second locking grasper was placed on the infundibulum to retract it laterally.  The peritoneum was stripped off the surface of the gallbladder,  exposing Calot's triangle.  The cystic duct and artery were readily identified as they entered the gallbladder and were dissected circumferentially producing the critical view containing the cystic duct, cystic artery, infundibulum of the gallbladder, and the liver posteriorly.  Two clips were placed proximally and one distally on both the cystic duct and artery and both were transected with endosheers.  The gallbladder was removed from the gallbladder fossa with electrocautery and then from the abdomen with an EndoCatch bag. Re-inspection of the abdomen showed our clips to be satisfactory and hemostasis excellent.  I did elect to leave a piece of surgicel in the gallbladder fossa due to the acutely inflammed nature.  The abdomen was irrigated with normal saline until the aspirate was clear.  The trocars were removed under direct vision.and the pneumoperitoneum was released. The epigastric fascia was closed with 0 Vicryl in a figure of 8 fashion. The skin incisions were closed with 4-0 Monocryl in a subcuticular fashion, and reinforced with Mastisol, Steri-Strips, and Band-Aids. The patient tolerated the procedure well and was brought to Recovery Room in stable condition. Lap and needle counts were correct at the end of the case.

## 2024-09-20 NOTE — TRANSFER OF CARE
"Anesthesia Transfer of Care Note    Patient: Karine Nayak    Procedure(s) Performed: Procedure(s) (LRB):  CHOLECYSTECTOMY, LAPAROSCOPIC (N/A)    Patient location: PACU    Anesthesia Type: general    Transport from OR: Transported from OR on 6-10 L/min O2 by face mask with adequate spontaneous ventilation    Post pain: adequate analgesia    Post assessment: no apparent anesthetic complications and tolerated procedure well    Post vital signs: stable    Level of consciousness: awake    Nausea/Vomiting: no nausea/vomiting    Complications: none    Transfer of care protocol was followed      Last vitals: Visit Vitals  /78   Pulse 65   Temp 36.3 °C (97.3 °F) (Skin)   Resp 18   Ht 5' 8" (1.727 m)   Wt 89.8 kg (198 lb 1.3 oz)   LMP 01/30/2013   SpO2 99%   Breastfeeding No   BMI 30.12 kg/m²     "

## 2024-09-20 NOTE — ANESTHESIA POSTPROCEDURE EVALUATION
Anesthesia Post Evaluation    Patient: Karine Nayak    Procedure(s) Performed: Procedure(s) (LRB):  CHOLECYSTECTOMY, LAPAROSCOPIC (N/A)    Final Anesthesia Type: general      Patient location during evaluation: PACU  Patient participation: Yes- Able to Participate  Level of consciousness: awake and alert and oriented  Post-procedure vital signs: reviewed and stable  Pain management: adequate  Airway patency: patent    PONV status at discharge: No PONV  Anesthetic complications: no      Cardiovascular status: blood pressure returned to baseline and hemodynamically stable  Respiratory status: unassisted, spontaneous ventilation and room air  Hydration status: euvolemic  Follow-up not needed.              Vitals Value Taken Time   /77 09/20/24 1205   Temp 36.4 °C (97.6 °F) 09/20/24 1205   Pulse 83 09/20/24 1205   Resp 16 09/20/24 1214   SpO2 95 % 09/20/24 1205         No case tracking events are documented in the log.      Pain/Rachna Score: Pain Rating Prior to Med Admin: 7 (9/20/2024 12:14 PM)  Pain Rating Post Med Admin: 9 (9/20/2024  7:04 AM)  Rachna Score: 10 (9/20/2024 11:44 AM)

## 2024-09-20 NOTE — ED PROVIDER NOTES
Encounter Date: 9/20/2024       History     Chief Complaint   Patient presents with    Nausea     Pt reports Nausea x 1 week     HPI    53 yo F presents with 4 days of abdominal pain associated with nausea and 2 episodes of vomiting. Pt reports epigastric abdominal pain worsen with eating/drinking. Decreased PO as patient is afraid to make pain worse. No fevers. + BM and passing gas. No urinary symptoms. Pt took PPI prior to coming to ED today.     Review of patient's allergies indicates:   Allergen Reactions    Ozempic [semaglutide]      Chest tightness,visual disturbances, vomiting, headaches, fatigue    Metformin      History of acute renal failure and hemodialysis     Past Medical History:   Diagnosis Date    Abnormal Pap smear of cervix     Acute stress reaction 11/04/2020    Adjustment insomnia 06/19/2019    SUZIE (acute kidney injury) 12/2016    after first gastric sleeve  had to be dialyzed    Bariatric surgery status     Gastric sleeve    Chronic anxiety     COPD (chronic obstructive pulmonary disease)     COVID-19 01/24/2022    COVID-19 virus infection 01/12/2024    Degenerative scoliosis in adult patient     Esophageal spasm 02/15/2018    Essential (primary) hypertension     Fibromyalgia     History of acute renal failure 04/19/2024    After gastric sleeve surgery; required hemodialysis      Hyperlipidemia     Hypothyroidism     JANETTE (iron deficiency anemia)     Inflammatory osteoarthritis     MDD (major depressive disorder), recurrent episode, mild 12/07/2020    Mixed common migraine and muscle contraction headache 11/04/2020    Muscle spasm     OCD (obsessive compulsive disorder)     MICHELINE on CPAP     3cm     PCOS (polycystic ovarian syndrome)     Personal history of colonic polyps 11/09/2023    Pleuritic chest pain     Prediabetes     PTSD (post-traumatic stress disorder) 11/04/2020    Rectocele     Traumatic perforation of esophagus 11/2015    During original Gastric Sleeve surgery     Past Surgical  History:   Procedure Laterality Date    BARIATRIC SURGERY  12/2016    Dr Estevez  Gastric sleeve, max weight 363    BLADDER SUSPENSION  2013    [place]    carpal tunnel Right 10/2022    Dr. Bear, and had injected    COLONOSCOPY  11/09/2023    Tay Davies MD    EPIDURAL STEROID INJECTION INTO LUMBAR SPINE  2016    [place]    KNEE ARTHROSCOPY  03/04/2020    KNEE ARTHROSCOPY W/ MENISCECTOMY Right 11/2014    ANAND Jenkins osteochondroma    LAPAROSCOPIC REVISION OF NOEMI-EN-Y GASTROENTEROSTOMY N/A 11/08/2018    Procedure: REVISION, GASTROENTEROSTOMY, NOEMI-EN-Y, LAPAROSCOPIC;  Surgeon: Vitaliy Hoskins Jr., MD;  Location: [facility] OR [address] [place] [address];  Service: General;  Laterality: N/A;    SLEEVE GASTROPLASTY      TONSILLECTOMY      as child    TOTAL ABDOMINAL HYSTERECTOMY W/ BILATERAL SALPINGOOPHORECTOMY  2013    [place]    TOTAL KNEE ARTHROPLASTY Right 03/2020    ANTIONE Jenkins    TUBAL LIGATION      WISDOM TOOTH EXTRACTION       Family History   Problem Relation Name Age of Onset    Hypertension Mother Jennifer     Rheum arthritis Mother Jennifer     Alcohol abuse Mother Jennifer     Mental illness Father      COPD Father      Lung cancer Father          mesothelioma    No Known Problems Sister      No Known Problems Brother      Katie Parkinson White syndrome Grandchild Roxana Radha     Mitochondrial disorder Grandchild Roxana Radha     Breast cancer Maternal Aunt      Breast cancer Maternal Aunt      Breast cancer Maternal Aunt      Ovarian cancer Maternal Cousin      Breast cancer Paternal Aunt      Colon cancer Neg Hx       Social History     Tobacco Use    Smoking status: Some Days     Current packs/day: 0.25     Average packs/day: 0.3 packs/day for 23.7 years (5.9 ttl pk-yrs)     Types: Cigarettes     Start date: 2001    Smokeless tobacco: Never    Tobacco comments:     hasn't smoke since    Substance Use Topics    Alcohol use: No    Drug use: Yes     Types: Hydrocodone     Review of Systems   Constitutional:  Negative for fever.    HENT:  Negative for trouble swallowing.    Respiratory:  Negative for shortness of breath.    Cardiovascular:  Negative for chest pain.   Gastrointestinal:  Positive for abdominal pain, nausea and vomiting.   Musculoskeletal:  Negative for back pain.   Skin:  Negative for rash.   All other systems reviewed and are negative.      Physical Exam     Initial Vitals [09/20/24 0258]   BP Pulse Resp Temp SpO2   (!) 140/84 109 16 98.7 °F (37.1 °C) 95 %      MAP       --         Physical Exam    Vitals reviewed.  Constitutional: She appears well-developed.   Eyes: EOM are normal.   Neck:   Normal range of motion.  Pulmonary/Chest: Breath sounds normal.   Abdominal: Abdomen is soft. There is abdominal tenderness. There is no rebound and no guarding.   Musculoskeletal:      Cervical back: Normal range of motion.     Neurological: She is alert and oriented to person, place, and time.   Skin: Skin is warm and dry. Capillary refill takes less than 2 seconds. No rash noted.         ED Course   Procedures  Labs Reviewed   CBC W/ AUTO DIFFERENTIAL - Abnormal       Result Value    WBC 7.20      RBC 5.00      Hemoglobin 14.1      Hematocrit 39.9      MCV 80 (*)     MCH 28.2      MCHC 35.3      RDW 13.9      Platelets 320      MPV 8.9 (*)     Immature Granulocytes 0.3      Gran # (ANC) 3.6      Immature Grans (Abs) 0.02      Lymph # 2.7      Mono # 0.5      Eos # 0.3      Baso # 0.03      nRBC 0      Gran % 50.0      Lymph % 37.9      Mono % 7.2      Eosinophil % 4.2      Basophil % 0.4      Differential Method Automated     COMPREHENSIVE METABOLIC PANEL - Abnormal    Sodium 139      Potassium 3.3 (*)     Chloride 102      CO2 23      Glucose 95      BUN 5 (*)     Creatinine 0.7      Calcium 9.5      Total Protein 7.5      Albumin 3.7      Total Bilirubin 0.7      Alkaline Phosphatase 142 (*)     AST 33      ALT 63 (*)     eGFR >60      Anion Gap 14     URINALYSIS, REFLEX TO URINE CULTURE - Abnormal    Specimen UA Urine, Clean Catch       Color, UA Yellow      Appearance, UA Clear      pH, UA 7.0      Specific Gravity, UA 1.010      Protein, UA Negative      Glucose, UA Negative      Ketones, UA 1+ (*)     Bilirubin (UA) Negative      Occult Blood UA Negative      Nitrite, UA Negative      Urobilinogen, UA 1.0      Leukocytes, UA Negative      Narrative:     Specimen Source->Urine   LIPASE    Lipase 12            Imaging Results              CT Abdomen Pelvis With IV Contrast NO Oral Contrast (Final result)  Result time 09/20/24 07:44:43      Final result by Piedad Rodriguez MD (09/20/24 07:44:43)                   Impression:      Constipation.  No free air or obstruction.  Small hiatal hernia and postoperative changes of the stomach and bowel.    The gallbladder is significantly hydropic in there small gallstones.  There is some equivocal thickening of the walls of the gallbladder.  Clinically correlate for the possibility of cholecystitis.  Right upper quadrant ultrasound could be beneficial.    Hepatic and renal cysts.    Stable nodule in the left lower lobe.      Electronically signed by: Piedad Rodriguez MD  Date:    09/20/2024  Time:    07:44               Narrative:    EXAMINATION:  CT ABDOMEN PELVIS WITH IV CONTRAST    CLINICAL HISTORY:  Abdominal pain, acute, nonlocalized;    TECHNIQUE:  Low dose axial images, sagittal and coronal reformations were obtained from the lung bases to the pubic symphysis following the IV administration of 100 mL of Omnipaque 350 .  Oral contrast was not given.    COMPARISON:  06/12/2024    FINDINGS:  Stable nodule in the posterior costophrenic sulcus on the left.  No basilar consolidation pleural effusions.  The base of the heart appears normal.  The aorta is of normal caliber and tapers appropriately.    The gallbladder is hydropic.  Gallstones are seen.  There may be some subtle gallbladder wall thickening.  Clinically correlate for the possibility of cholecystitis.  No intrahepatic or extrahepatic biliary  ductal dilatation is identified.  There are few scattered subcentimeter hypodensities in the liver, the largest in the left hepatic lobe measuring 1.2 cm, similar to previous studies and likely related to cysts.  The spleen, pancreas, adrenal glands and kidneys are normal in size, shape and contour.  Small bilateral renal cysts.  No hydronephrosis or hydroureter.  The urinary bladder is partially distended and has a normal appearance.  The uterus has been removed.  Adnexal regions are unremarkable.    Moderate colonic stool retention suggesting constipation.  The appendix is normal.  Postoperative changes of the stomach and bowel.  Small hiatal hernia.  No free air, free fluid or obstruction.  No pathologically enlarged abdominal or pelvic lymph nodes are seen.    Age-appropriate degenerative changes affect the skeleton.                                       Medications   sodium chloride 0.9% flush 10 mL (has no administration in time range)   melatonin tablet 6 mg (has no administration in time range)   0.9%  NaCl infusion (has no administration in time range)   morphine injection 4 mg (has no administration in time range)   ondansetron injection 4 mg (has no administration in time range)   0.9%  NaCl infusion (has no administration in time range)   HYDROcodone-acetaminophen 5-325 mg per tablet 1 tablet (has no administration in time range)   HYDROcodone-acetaminophen  mg per tablet 1 tablet (1 tablet Oral Given 9/20/24 1214)   lactated ringers bolus 1,000 mL (0 mLs Intravenous Stopped 9/20/24 0443)   aluminum-magnesium hydroxide-simethicone 200-200-20 mg/5 mL suspension 30 mL (30 mLs Oral Given 9/20/24 0316)     And   LIDOcaine viscous HCl 2% oral solution 15 mL (15 mLs Oral Given 9/20/24 0316)   ondansetron injection 8 mg (8 mg Intravenous Given 9/20/24 0358)   iohexoL (OMNIPAQUE 350) injection 100 mL (100 mLs Intravenous Given 9/20/24 0425)   morphine injection 4 mg (4 mg Intravenous Given 9/20/24 0639)    ondansetron injection 4 mg (4 mg Intravenous Given 9/20/24 0639)   HYDROmorphone injection 1 mg (1 mg Intravenous Given 9/20/24 5170)   sodium chloride 0.9% bolus 1,000 mL 1,000 mL (0 mLs Intravenous Stopped 9/20/24 0856)   ceFOXItin (MEFOXIN) 1 g in D5W 100 mL IVPB (MB+) (0 g Intravenous Stopped 9/20/24 0845)     Medical Decision Making  Assumed care at 6:00 a.m. patient is a 54-year-old female with a history of gastric sleeve admitted with epigastric pain and tenderness which started yesterday CT scan of the abdomen is pending abdomen is tender in the right upper quadrant localized tenderness     Amount and/or Complexity of Data Reviewed  Labs: ordered.  Radiology: ordered.    Risk  OTC drugs.  Prescription drug management.                                      Clinical Impression:  Final diagnoses:  [K81.0] Acute cholecystitis (Primary)          ED Disposition Condition    Observation                 JAIDEN Galvan III, MD  09/20/24 5871

## 2024-09-20 NOTE — BRIEF OP NOTE
[facility] - Surgery  Brief Operative Note    Surgery Date: 9/20/2024     Surgeons and Role:     * Ezio Smith MD - Primary    Assisting Surgeon: None    Pre-op Diagnosis:  Acute cholecystitis [K81.0]    Post-op Diagnosis:  Post-Op Diagnosis Codes:     * Acute cholecystitis [K81.0]    Procedure(s) (LRB):  CHOLECYSTECTOMY, LAPAROSCOPIC (N/A)    Anesthesia: General    Operative Findings:    Acute cholecystitis  Surgicel left in gallbladder fossa    Estimated Blood Loss: 25cc         Specimens:   Specimen (24h ago, onward)       Start     Ordered    09/20/24 1017  Specimen to Pathology, Surgery General Surgery  Once        Comments: Pre-op Diagnosis: Acute cholecystitis [K81.0]Procedure(s):CHOLECYSTECTOMY, LAPAROSCOPIC Number of specimens: 1Name of specimens: gallbladderDr. Sarah     References:    Click here for ordering Quick Tip   Question Answer Comment   Procedure Type: General Surgery    Release to patient Immediate        09/20/24 1020                      Discharge Note    OUTCOME: Patient tolerated treatment/procedure well without complication and is now ready for discharge.    DISPOSITION: Home or Self Care    FINAL DIAGNOSIS:  Acute cholecystitis    FOLLOWUP: In clinic    DISCHARGE INSTRUCTIONS:    Discharge Procedure Orders   Diet general     Lifting restrictions     Call MD for:  extreme fatigue     Call MD for:  persistent dizziness or light-headedness     Call MD for:  hives     Call MD for:  redness, tenderness, or signs of infection (pain, swelling, redness, odor or green/yellow discharge around incision site)     Call MD for:  difficulty breathing, headache or visual disturbances     Call MD for:  severe uncontrolled pain     Call MD for:  persistent nausea and vomiting     Call MD for:  temperature >100.4     Remove dressing in 48 hours     Shower on day dressing removed (No bath)

## 2024-09-20 NOTE — H&P
Ok per MD to hold off of IV for now so pt can go to ultrasound   [facility] - Emergency Dept  History & Physical  General Surgery    SUBJECTIVE:     Chief Complaint: epigastric pain, N/V    History of Present Illness:    Patient is a 54 y.o. female presents to the ED c/o 4 day h/o severe nausea, several episodes of emesis, and epigastric pain.  Symptoms are worse with post-prandially.  She took PPI without improvement.  Eval in the ED showed elevated alk phos and CT showing distended gallbladder with mild inflammatory changes.  She has h/o lap gastric sleeve which was converted to a RNY GB due to reflux.      (Not in a hospital admission)      Review of patient's allergies indicates:   Allergen Reactions    Ozempic [semaglutide]      Chest tightness,visual disturbances, vomiting, headaches, fatigue    Metformin      History of acute renal failure and hemodialysis       Past Medical History:   Diagnosis Date    Abnormal Pap smear of cervix     Acute stress reaction 11/04/2020    Adjustment insomnia 06/19/2019    SUZIE (acute kidney injury) 12/2016    after first gastric sleeve  had to be dialyzed    Bariatric surgery status     Gastric sleeve    Chronic anxiety     COPD (chronic obstructive pulmonary disease)     COVID-19 01/24/2022    COVID-19 virus infection 01/12/2024    Degenerative scoliosis in adult patient     Esophageal spasm 02/15/2018    Essential (primary) hypertension     Fibromyalgia     History of acute renal failure 04/19/2024    After gastric sleeve surgery; required hemodialysis      Hyperlipidemia     Hypothyroidism     JANETTE (iron deficiency anemia)     Inflammatory osteoarthritis     MDD (major depressive disorder), recurrent episode, mild 12/07/2020    Mixed common migraine and muscle contraction headache 11/04/2020    Muscle spasm     OCD (obsessive compulsive disorder)     MICHELINE on CPAP     3cm     PCOS (polycystic ovarian syndrome)     Personal history of colonic polyps 11/09/2023    Pleuritic chest pain     Prediabetes     PTSD (post-traumatic stress disorder)  11/04/2020    Rectocele     Traumatic perforation of esophagus 11/2015    During original Gastric Sleeve surgery     Past Surgical History:   Procedure Laterality Date    BARIATRIC SURGERY  12/2016    Dr Estevez  Gastric sleeve, max weight 363    BLADDER SUSPENSION  2013    [place]    carpal tunnel Right 10/2022    Dr. Bear, and had injected    COLONOSCOPY  11/09/2023    Tay Davies MD    EPIDURAL STEROID INJECTION INTO LUMBAR SPINE  2016    [place]    KNEE ARTHROSCOPY  03/04/2020    KNEE ARTHROSCOPY W/ MENISCECTOMY Right 11/2014    ANAND Jenkins osteochondroma    LAPAROSCOPIC REVISION OF NOEMI-EN-Y GASTROENTEROSTOMY N/A 11/08/2018    Procedure: REVISION, GASTROENTEROSTOMY, NOEMI-EN-Y, LAPAROSCOPIC;  Surgeon: Vitaliy Hoskins Jr., MD;  Location: [facility] OR [address] [place] [address];  Service: General;  Laterality: N/A;    SLEEVE GASTROPLASTY      TONSILLECTOMY      as child    TOTAL ABDOMINAL HYSTERECTOMY W/ BILATERAL SALPINGOOPHORECTOMY  2013    [place]    TOTAL KNEE ARTHROPLASTY Right 03/2020    ANTIONE Jenkins    TUBAL LIGATION      WISDOM TOOTH EXTRACTION       Family History   Problem Relation Name Age of Onset    Hypertension Mother Jennifer     Rheum arthritis Mother Jennifer     Alcohol abuse Mother Jennifer     Mental illness Father      COPD Father      Lung cancer Father          mesothelioma    No Known Problems Sister      No Known Problems Brother      Katie Parkinson White syndrome Grandchild Roxana Radha     Mitochondrial disorder Grandchild Roxana Radha     Breast cancer Maternal Aunt      Breast cancer Maternal Aunt      Breast cancer Maternal Aunt      Ovarian cancer Maternal Cousin      Breast cancer Paternal Aunt      Colon cancer Neg Hx       Social History     Tobacco Use    Smoking status: Some Days     Current packs/day: 0.25     Average packs/day: 0.3 packs/day for 23.7 years (5.9 ttl pk-yrs)     Types: Cigarettes     Start date: 2001    Smokeless tobacco: Never    Tobacco comments:     hasn't smoke since    Substance Use  Topics    Alcohol use: No    Drug use: Yes     Types: Hydrocodone        Review of Systems:  Constitutional: no fever or chills  Respiratory: no cough or shortness of breath  Cardiovascular: no chest pain or palpitations  Gastrointestinal: + abd pain, N/V  Musculoskeletal: no arthralgias or myalgias  Neurological: no seizures or tremors    OBJECTIVE:     Vital Signs (Most Recent):  Temp: 97.3 °F (36.3 °C) (09/20/24 0917)  Pulse: 65 (09/20/24 0917)  Resp: 18 (09/20/24 0917)  BP: 130/78 (09/20/24 0920)  SpO2: 99 % (09/20/24 0917)    Physical Exam:  NAD  A&Ox3, GCS 15  NCAT  PERRL  RRR  CTAb  Soft, ND, TTP in epigastrium and RUQ, neg Blakely's but just got pain meds, +BS  No C/C/E, MAEW    Laboratory:  CBC:   Recent Labs   Lab 09/20/24  0322   WBC 7.20   RBC 5.00   HGB 14.1   HCT 39.9      MCV 80*   MCH 28.2   MCHC 35.3     CMP:   Recent Labs   Lab 09/20/24  0322   GLU 95   CALCIUM 9.5   ALBUMIN 3.7   PROT 7.5      K 3.3*   CO2 23      BUN 5*   CREATININE 0.7   ALKPHOS 142*   ALT 63*   AST 33   BILITOT 0.7       Diagnostic Results:  CT: Reviewed    ASSESSMENT/PLAN:      54 y.o. female with acute cholecystitis    Plan: Proceed with cholecystectomy.

## 2024-09-20 NOTE — ED TRIAGE NOTES
54 y.o. female presents to ER ED 01/ED 01A   Chief Complaint   Patient presents with    Nausea     Pt reports Nausea x 1 week   . No acute distress noted.

## 2024-09-20 NOTE — PLAN OF CARE
Tolerated small amount of lunch, no nausea. Void x1. Pain improved ( now tolerable)     Dc instructions given to pt and daughter.   Voiced compliance and understanding.

## 2024-09-24 ENCOUNTER — PATIENT OUTREACH (OUTPATIENT)
Dept: ADMINISTRATIVE | Facility: CLINIC | Age: 54
End: 2024-09-24
Payer: MEDICARE

## 2024-09-25 NOTE — PROGRESS NOTES
2nd Attempt made to reach patient for [facility] call. Left voicemail please call [phone] leave first name, last name, and  Sneha will return your call.

## 2024-09-26 LAB
FINAL PATHOLOGIC DIAGNOSIS: NORMAL
GROSS: NORMAL
Lab: NORMAL

## 2024-09-26 NOTE — PROGRESS NOTES
3rd Attempt made to reach patient for [facility] call. Left voicemail please call [phone] leave first name, last name, and  Sneha will return your call.

## 2024-10-07 ENCOUNTER — HOSPITAL ENCOUNTER (EMERGENCY)
Facility: HOSPITAL | Age: 54
Discharge: ANOTHER HOSPITAL | End: 2024-10-07
Attending: EMERGENCY MEDICINE
Payer: MEDICARE

## 2024-10-07 VITALS
BODY MASS INDEX: 30.01 KG/M2 | OXYGEN SATURATION: 97 % | WEIGHT: 198 LBS | RESPIRATION RATE: 18 BRPM | TEMPERATURE: 98 F | DIASTOLIC BLOOD PRESSURE: 53 MMHG | SYSTOLIC BLOOD PRESSURE: 94 MMHG | HEIGHT: 68 IN | HEART RATE: 86 BPM

## 2024-10-07 DIAGNOSIS — A41.9 SEPTIC SHOCK: ICD-10-CM

## 2024-10-07 DIAGNOSIS — A41.9 SEPSIS, DUE TO UNSPECIFIED ORGANISM, UNSPECIFIED WHETHER ACUTE ORGAN DYSFUNCTION PRESENT: Primary | ICD-10-CM

## 2024-10-07 DIAGNOSIS — R65.21 SEPTIC SHOCK: ICD-10-CM

## 2024-10-07 DIAGNOSIS — R00.0 TACHYCARDIA: ICD-10-CM

## 2024-10-07 DIAGNOSIS — T81.12XD: ICD-10-CM

## 2024-10-07 PROBLEM — T81.9XXA POST-OPERATIVE COMPLICATION: Status: ACTIVE | Noted: 2024-10-07

## 2024-10-07 LAB
ABO + RH BLD: NORMAL
ALBUMIN SERPL BCP-MCNC: 2.3 G/DL (ref 3.5–5.2)
ALP SERPL-CCNC: 212 U/L (ref 55–135)
ALT SERPL W/O P-5'-P-CCNC: 18 U/L (ref 10–44)
ANION GAP SERPL CALC-SCNC: 13 MMOL/L (ref 8–16)
ANISOCYTOSIS BLD QL SMEAR: ABNORMAL
APTT PPP: 32.8 SEC (ref 21–32)
AST SERPL-CCNC: 22 U/L (ref 10–40)
BASOPHILS # BLD AUTO: ABNORMAL K/UL (ref 0–0.2)
BASOPHILS NFR BLD: 0 % (ref 0–1.9)
BILIRUB SERPL-MCNC: 2.2 MG/DL (ref 0.1–1)
BILIRUB UR QL STRIP: NEGATIVE
BLD GP AB SCN CELLS X3 SERPL QL: NORMAL
BLD PROD TYP BPU: NORMAL
BLOOD UNIT EXPIRATION DATE: NORMAL
BLOOD UNIT TYPE CODE: 7300
BLOOD UNIT TYPE: NORMAL
BUN SERPL-MCNC: 24 MG/DL (ref 6–20)
CALCIUM SERPL-MCNC: 9 MG/DL (ref 8.7–10.5)
CHLORIDE SERPL-SCNC: 91 MMOL/L (ref 95–110)
CLARITY UR: CLEAR
CO2 SERPL-SCNC: 28 MMOL/L (ref 23–29)
CODING SYSTEM: NORMAL
COLOR UR: YELLOW
CREAT SERPL-MCNC: 1.1 MG/DL (ref 0.5–1.4)
CROSSMATCH INTERPRETATION: NORMAL
DIFFERENTIAL METHOD BLD: ABNORMAL
DISPENSE STATUS: NORMAL
DOHLE BOD BLD QL SMEAR: PRESENT
EOSINOPHIL # BLD AUTO: ABNORMAL K/UL (ref 0–0.5)
EOSINOPHIL NFR BLD: 0 % (ref 0–8)
ERYTHROCYTE [DISTWIDTH] IN BLOOD BY AUTOMATED COUNT: 13.2 % (ref 11.5–14.5)
EST. GFR  (NO RACE VARIABLE): >60 ML/MIN/1.73 M^2
GIANT PLATELETS BLD QL SMEAR: PRESENT
GLUCOSE SERPL-MCNC: 133 MG/DL (ref 70–110)
GLUCOSE UR QL STRIP: NEGATIVE
HCT VFR BLD AUTO: 21.4 % (ref 37–48.5)
HGB BLD-MCNC: 7.8 G/DL (ref 12–16)
HGB UR QL STRIP: NEGATIVE
HYPOCHROMIA BLD QL SMEAR: ABNORMAL
IMM GRANULOCYTES # BLD AUTO: ABNORMAL K/UL (ref 0–0.04)
IMM GRANULOCYTES NFR BLD AUTO: ABNORMAL % (ref 0–0.5)
INR PPP: 1.3 (ref 0.8–1.2)
KETONES UR QL STRIP: NEGATIVE
LACTATE SERPL-SCNC: 1.3 MMOL/L (ref 0.5–2.2)
LACTATE SERPL-SCNC: 3.1 MMOL/L (ref 0.5–2.2)
LEUKOCYTE ESTERASE UR QL STRIP: NEGATIVE
LYMPHOCYTES # BLD AUTO: ABNORMAL K/UL (ref 1–4.8)
LYMPHOCYTES NFR BLD: 29 % (ref 18–48)
MCH RBC QN AUTO: 27.6 PG (ref 27–31)
MCHC RBC AUTO-ENTMCNC: 36.4 G/DL (ref 32–36)
MCV RBC AUTO: 76 FL (ref 82–98)
METAMYELOCYTES NFR BLD MANUAL: 4 %
MONOCYTES # BLD AUTO: ABNORMAL K/UL (ref 0.3–1)
MONOCYTES NFR BLD: 2 % (ref 4–15)
MYELOCYTES NFR BLD MANUAL: 4 %
NEUTROPHILS NFR BLD: 46 % (ref 38–73)
NEUTS BAND NFR BLD MANUAL: 12 %
NITRITE UR QL STRIP: NEGATIVE
NRBC BLD-RTO: 2 /100 WBC
NUM UNITS TRANS FFP: NORMAL
NUM UNITS TRANS FFP: NORMAL
NUM UNITS TRANS PACKED RBC: NORMAL
NUM UNITS TRANS PACKED RBC: NORMAL
PH UR STRIP: 6 [PH] (ref 5–8)
PLATELET # BLD AUTO: 1015 K/UL (ref 150–450)
PLATELET BLD QL SMEAR: ABNORMAL
PMV BLD AUTO: 8.9 FL (ref 9.2–12.9)
POIKILOCYTOSIS BLD QL SMEAR: SLIGHT
POLYCHROMASIA BLD QL SMEAR: ABNORMAL
POTASSIUM SERPL-SCNC: 3.6 MMOL/L (ref 3.5–5.1)
PROMYELOCYTES NFR BLD MANUAL: 3 %
PROT SERPL-MCNC: 7.1 G/DL (ref 6–8.4)
PROT UR QL STRIP: NEGATIVE
PROTHROMBIN TIME: 13.5 SEC (ref 9–12.5)
RBC # BLD AUTO: 2.83 M/UL (ref 4–5.4)
SODIUM SERPL-SCNC: 132 MMOL/L (ref 136–145)
SP GR UR STRIP: <=1.005 (ref 1–1.03)
SPECIMEN OUTDATE: NORMAL
TOXIC GRANULES BLD QL SMEAR: PRESENT
URN SPEC COLLECT METH UR: ABNORMAL
UROBILINOGEN UR STRIP-ACNC: 1 EU/DL
WBC # BLD AUTO: 14.91 K/UL (ref 3.9–12.7)

## 2024-10-07 PROCEDURE — 85007 BL SMEAR W/DIFF WBC COUNT: CPT | Performed by: EMERGENCY MEDICINE

## 2024-10-07 PROCEDURE — 85060 BLOOD SMEAR INTERPRETATION: CPT | Mod: ,,, | Performed by: PATHOLOGY

## 2024-10-07 PROCEDURE — 83605 ASSAY OF LACTIC ACID: CPT | Performed by: EMERGENCY MEDICINE

## 2024-10-07 PROCEDURE — 25500020 PHARM REV CODE 255: Performed by: EMERGENCY MEDICINE

## 2024-10-07 PROCEDURE — 80053 COMPREHEN METABOLIC PANEL: CPT | Performed by: EMERGENCY MEDICINE

## 2024-10-07 PROCEDURE — 85730 THROMBOPLASTIN TIME PARTIAL: CPT | Performed by: EMERGENCY MEDICINE

## 2024-10-07 PROCEDURE — 96367 TX/PROPH/DG ADDL SEQ IV INF: CPT

## 2024-10-07 PROCEDURE — 96361 HYDRATE IV INFUSION ADD-ON: CPT

## 2024-10-07 PROCEDURE — 63600175 PHARM REV CODE 636 W HCPCS: Performed by: EMERGENCY MEDICINE

## 2024-10-07 PROCEDURE — 86920 COMPATIBILITY TEST SPIN: CPT | Mod: 59 | Performed by: EMERGENCY MEDICINE

## 2024-10-07 PROCEDURE — 96366 THER/PROPH/DIAG IV INF ADDON: CPT

## 2024-10-07 PROCEDURE — 51702 INSERT TEMP BLADDER CATH: CPT

## 2024-10-07 PROCEDURE — 85610 PROTHROMBIN TIME: CPT | Performed by: EMERGENCY MEDICINE

## 2024-10-07 PROCEDURE — 99291 CRITICAL CARE FIRST HOUR: CPT

## 2024-10-07 PROCEDURE — 93010 ELECTROCARDIOGRAM REPORT: CPT | Mod: ,,, | Performed by: INTERNAL MEDICINE

## 2024-10-07 PROCEDURE — P9016 RBC LEUKOCYTES REDUCED: HCPCS | Performed by: EMERGENCY MEDICINE

## 2024-10-07 PROCEDURE — 36430 TRANSFUSION BLD/BLD COMPNT: CPT

## 2024-10-07 PROCEDURE — 86850 RBC ANTIBODY SCREEN: CPT | Performed by: EMERGENCY MEDICINE

## 2024-10-07 PROCEDURE — P9017 PLASMA 1 DONOR FRZ W/IN 8 HR: HCPCS | Performed by: EMERGENCY MEDICINE

## 2024-10-07 PROCEDURE — 87040 BLOOD CULTURE FOR BACTERIA: CPT | Mod: 59 | Performed by: EMERGENCY MEDICINE

## 2024-10-07 PROCEDURE — 81003 URINALYSIS AUTO W/O SCOPE: CPT | Performed by: EMERGENCY MEDICINE

## 2024-10-07 PROCEDURE — 93005 ELECTROCARDIOGRAM TRACING: CPT | Mod: 59

## 2024-10-07 PROCEDURE — 96365 THER/PROPH/DIAG IV INF INIT: CPT | Mod: 59

## 2024-10-07 PROCEDURE — 96360 HYDRATION IV INFUSION INIT: CPT | Mod: 59

## 2024-10-07 PROCEDURE — 36415 COLL VENOUS BLD VENIPUNCTURE: CPT | Performed by: EMERGENCY MEDICINE

## 2024-10-07 PROCEDURE — 25000003 PHARM REV CODE 250: Performed by: EMERGENCY MEDICINE

## 2024-10-07 PROCEDURE — 85027 COMPLETE CBC AUTOMATED: CPT | Performed by: EMERGENCY MEDICINE

## 2024-10-07 RX ORDER — ACETAMINOPHEN 500 MG
1000 TABLET ORAL
Status: COMPLETED | OUTPATIENT
Start: 2024-10-07 | End: 2024-10-07

## 2024-10-07 RX ORDER — NOREPINEPHRINE BITARTRATE/D5W 4MG/250ML
0-3 PLASTIC BAG, INJECTION (ML) INTRAVENOUS CONTINUOUS
Status: DISCONTINUED | OUTPATIENT
Start: 2024-10-07 | End: 2024-10-07 | Stop reason: HOSPADM

## 2024-10-07 RX ORDER — DIAZEPAM 2 MG/1
2 TABLET ORAL
Status: COMPLETED | OUTPATIENT
Start: 2024-10-07 | End: 2024-10-07

## 2024-10-07 RX ORDER — HYDROCODONE BITARTRATE AND ACETAMINOPHEN 500; 5 MG/1; MG/1
TABLET ORAL
Status: DISCONTINUED | OUTPATIENT
Start: 2024-10-07 | End: 2024-10-07 | Stop reason: HOSPADM

## 2024-10-07 RX ORDER — HYDROMORPHONE HYDROCHLORIDE 1 MG/ML
0.5 INJECTION, SOLUTION INTRAMUSCULAR; INTRAVENOUS; SUBCUTANEOUS
Status: COMPLETED | OUTPATIENT
Start: 2024-10-07 | End: 2024-10-07

## 2024-10-07 RX ADMIN — SODIUM CHLORIDE 1000 ML: 0.9 INJECTION, SOLUTION INTRAVENOUS at 01:10

## 2024-10-07 RX ADMIN — NOREPINEPHRINE BITARTRATE 0.02 MCG/KG/MIN: 4 INJECTION, SOLUTION INTRAVENOUS at 04:10

## 2024-10-07 RX ADMIN — SODIUM CHLORIDE 1000 ML: 0.9 INJECTION, SOLUTION INTRAVENOUS at 04:10

## 2024-10-07 RX ADMIN — PIPERACILLIN AND TAZOBACTAM 4.5 G: 4; .5 INJECTION, POWDER, LYOPHILIZED, FOR SOLUTION INTRAVENOUS; PARENTERAL at 11:10

## 2024-10-07 RX ADMIN — IOHEXOL 100 ML: 350 INJECTION, SOLUTION INTRAVENOUS at 12:10

## 2024-10-07 RX ADMIN — ACETAMINOPHEN 1000 MG: 500 TABLET ORAL at 11:10

## 2024-10-07 RX ADMIN — DIAZEPAM 2 MG: 2 TABLET ORAL at 01:10

## 2024-10-07 RX ADMIN — SODIUM CHLORIDE 1000 ML: 9 INJECTION, SOLUTION INTRAVENOUS at 11:10

## 2024-10-07 RX ADMIN — VANCOMYCIN HYDROCHLORIDE 750 MG: 750 INJECTION, POWDER, LYOPHILIZED, FOR SOLUTION INTRAVENOUS at 12:10

## 2024-10-07 RX ADMIN — HYDROMORPHONE HYDROCHLORIDE 0.5 MG: 1 INJECTION, SOLUTION INTRAMUSCULAR; INTRAVENOUS; SUBCUTANEOUS at 02:10

## 2024-10-07 RX ADMIN — VANCOMYCIN HYDROCHLORIDE 1000 MG: 1 INJECTION, POWDER, LYOPHILIZED, FOR SOLUTION INTRAVENOUS at 12:10

## 2024-10-07 NOTE — ED TRIAGE NOTES
Patient s/p Cholecystectomy 9/20/2024 per Dr. Smith with swelling to mid abdominal incision area x a few days. Patient reports feeling light headed and feeling weak.

## 2024-10-07 NOTE — ED PROVIDER NOTES
Encounter Date: 10/7/2024       History     Chief Complaint   Patient presents with    Post-op Problem     HPI    Patient is a 54y AAF presenting with abdominal pain and distension.  She had a cholecystectomy by Dr. Smith on 9/20/24.  States for the past 2 days she noticed an area protruding at one of the laprascopic sites.  Endorses fatigue and malaise.  Pain is rated 8/10, worse with movement and constant.   Review of patient's allergies indicates:   Allergen Reactions    Ozempic [semaglutide]      Chest tightness,visual disturbances, vomiting, headaches, fatigue    Metformin      History of acute renal failure and hemodialysis     Past Medical History:   Diagnosis Date    Abnormal Pap smear of cervix     Acute stress reaction 11/04/2020    Adjustment insomnia 06/19/2019    SUZIE (acute kidney injury) 12/2016    after first gastric sleeve  had to be dialyzed    Bariatric surgery status     Gastric sleeve    Chronic anxiety     COPD (chronic obstructive pulmonary disease)     COVID-19 01/24/2022    COVID-19 virus infection 01/12/2024    Degenerative scoliosis in adult patient     Esophageal spasm 02/15/2018    Essential (primary) hypertension     Fibromyalgia     History of acute renal failure 04/19/2024    After gastric sleeve surgery; required hemodialysis      Hyperlipidemia     Hypothyroidism     JANETTE (iron deficiency anemia)     Inflammatory osteoarthritis     MDD (major depressive disorder), recurrent episode, mild 12/07/2020    Mixed common migraine and muscle contraction headache 11/04/2020    Muscle spasm     OCD (obsessive compulsive disorder)     MICHELINE on CPAP     3cm     PCOS (polycystic ovarian syndrome)     Personal history of colonic polyps 11/09/2023    Pleuritic chest pain     Prediabetes     PTSD (post-traumatic stress disorder) 11/04/2020    Rectocele     Traumatic perforation of esophagus 11/2015    During original Gastric Sleeve surgery     Past Surgical History:   Procedure Laterality Date     BARIATRIC SURGERY  12/2016    Dr Estevez  Gastric sleeve, max weight 363    BLADDER SUSPENSION  2013    [place]    carpal tunnel Right 10/2022    Dr. Bear, and had injected    COLONOSCOPY  11/09/2023    Tay Davies MD    EPIDURAL STEROID INJECTION INTO LUMBAR SPINE  2016    [place]    KNEE ARTHROSCOPY  03/04/2020    KNEE ARTHROSCOPY W/ MENISCECTOMY Right 11/2014    ANAND Jeknins osteochondroma    LAPAROSCOPIC CHOLECYSTECTOMY N/A 9/20/2024    Procedure: CHOLECYSTECTOMY, LAPAROSCOPIC;  Surgeon: Ezio Smith MD;  Location: [facility] OR;  Service: General;  Laterality: N/A;    LAPAROSCOPIC REVISION OF NOEMI-EN-Y GASTROENTEROSTOMY N/A 11/08/2018    Procedure: REVISION, GASTROENTEROSTOMY, NOEMI-EN-Y, LAPAROSCOPIC;  Surgeon: Vitaliy Hoskins Jr., MD;  Location: [facility] OR 2ND FLR;  Service: General;  Laterality: N/A;    SLEEVE GASTROPLASTY      TONSILLECTOMY      as child    TOTAL ABDOMINAL HYSTERECTOMY W/ BILATERAL SALPINGOOPHORECTOMY  2013    [facility]    TOTAL KNEE ARTHROPLASTY Right 03/2020    ANTIONE Jenkisn    TUBAL LIGATION      WISDOM TOOTH EXTRACTION       Family History   Problem Relation Name Age of Onset    Hypertension Mother Jennifer     Rheum arthritis Mother Jennifer     Alcohol abuse Mother Jennifer     Mental illness Father      COPD Father      Lung cancer Father          mesothelioma    No Known Problems Sister      No Known Problems Brother      Katie Parkinson White syndrome Grandchild Roxana Radha     Mitochondrial disorder Grandchild Roxana Radha     Breast cancer Maternal Aunt      Breast cancer Maternal Aunt      Breast cancer Maternal Aunt      Ovarian cancer Maternal Cousin      Breast cancer Paternal Aunt      Colon cancer Neg Hx       Social History     Tobacco Use    Smoking status: Some Days     Current packs/day: 0.25     Average packs/day: 0.3 packs/day for 23.8 years (5.9 ttl pk-yrs)     Types: Cigarettes     Start date: 2001    Smokeless tobacco: Never    Tobacco comments:     hasn't smoke since     Substance Use Topics    Alcohol use: No    Drug use: Yes     Types: Hydrocodone     Review of Systems   Constitutional:  Negative for chills and fatigue.   Respiratory:  Negative for cough.    Gastrointestinal:  Positive for abdominal pain.   Musculoskeletal:  Negative for back pain.   Skin:  Negative for wound.   All other systems reviewed and are negative.    Social Drivers of Health     Tobacco Use: High Risk (10/7/2024)    Patient History     Smoking Tobacco Use: Some Days     Smokeless Tobacco Use: Never     Passive Exposure: Not on file   Alcohol Use: Not At Risk (8/13/2024)    AUDIT-C     Frequency of Alcohol Consumption: Never     Average Number of Drinks: Patient does not drink     Frequency of Binge Drinking: Never   Financial Resource Strain: High Risk (8/13/2024)    Overall Financial Resource Strain (CARDIA)     Difficulty of Paying Living Expenses: Very hard   Food Insecurity: No Food Insecurity (8/13/2024)    Hunger Vital Sign     Worried About Running Out of Food in the Last Year: Never true     Ran Out of Food in the Last Year: Never true   Transportation Needs: No Transportation Needs (8/13/2024)    PRAPARE - Transportation     Lack of Transportation (Medical): No     Lack of Transportation (Non-Medical): No   Physical Activity: Insufficiently Active (8/13/2024)    Exercise Vital Sign     Days of Exercise per Week: 2 days     Minutes of Exercise per Session: 30 min   Stress: Stress Concern Present (8/13/2024)    [facility] - Occupational Stress Questionnaire     Feeling of Stress : To some extent   Housing Stability: Low Risk  (8/13/2024)    Housing Stability Vital Sign     Unable to Pay for Housing in the Last Year: No     Homeless in the Last Year: No   Depression: Low Risk  (8/13/2024)    Depression     Last PHQ-4: Flowsheet Data: 2   Utilities: At Risk (8/13/2024)    [facility] Utilities     Threatened with loss of utilities: Yes   Health Literacy: Adequate Health  Literacy (8/13/2024)     Health Literacy     Frequency of need for help with medical instructions: Never   Social Isolation: Not on file       Physical Exam     Initial Vitals [10/07/24 1052]   BP Pulse Resp Temp SpO2   (!) 104/57 (!) 138 20 (!) 101.5 °F (38.6 °C) 96 %      MAP       --         Physical Exam    Nursing note and vitals reviewed.  Constitutional: She appears well-developed and well-nourished.   HENT:   Head: Normocephalic. Mouth/Throat: Oropharynx is clear and moist.   Eyes: EOM are normal. Pupils are equal, round, and reactive to light.   Neck:   Normal range of motion.  Cardiovascular:  Intact distal pulses.   Tachycardia present.         Pulmonary/Chest: Breath sounds normal. She has no wheezes. She has no rales.   Abdominal: Abdomen is soft. She exhibits distension.   Musculoskeletal:      Cervical back: Normal range of motion.     Neurological: She is alert and oriented to person, place, and time. GCS score is 15. GCS eye subscore is 4. GCS verbal subscore is 5. GCS motor subscore is 6.   Skin: Capillary refill takes less than 2 seconds.         ED Course   Critical Care    Date/Time: 10/7/2024 2:47 PM    Performed by: Usha Dawson MD  Authorized by: Usha Dawson MD  Direct patient critical care time: 85 minutes  Total critical care time (exclusive of procedural time) : 85 minutes  Critical care was time spent personally by me on the following activities: pulse oximetry, re-evaluation of patient's condition, ordering and review of radiographic studies, ordering and review of laboratory studies and ordering and performing treatments and interventions.        Labs Reviewed   CBC W/ AUTO DIFFERENTIAL - Abnormal       Result Value    WBC 14.91 (*)     RBC 2.83 (*)     Hemoglobin 7.8 (*)     Hematocrit 21.4 (*)     MCV 76 (*)     MCH 27.6      MCHC 36.4 (*)     RDW 13.2      Platelets 1,015 (*)     MPV 8.9 (*)     Immature Granulocytes CANCELED      Immature Grans (Abs) CANCELED       Lymph # CANCELED      Mono # CANCELED      Eos # CANCELED      Baso # CANCELED      nRBC 2 (*)     Gran % 46.0      Lymph % 29.0      Mono % 2.0 (*)     Eosinophil % 0.0      Basophil % 0.0      Bands 12.0      Metamyelocytes 4.0      Myelocytes 4.0      Promyelocytes 3.0      Platelet Estimate Increased (*)     Aniso Moderate      Poik Slight      Poly Moderate      Hypo Moderate      Dohle Bodies Present      Toxic Granulation Present      Large/Giant Platelets Present      Differential Method Manual      Narrative:       Platelet critical result(s) called and verbal readback obtained   from   Pam Kincaid RN. by [facility] 10/07/2024 11:54   COMPREHENSIVE METABOLIC PANEL - Abnormal    Sodium 132 (*)     Potassium 3.6      Chloride 91 (*)     CO2 28      Glucose 133 (*)     BUN 24 (*)     Creatinine 1.1      Calcium 9.0      Total Protein 7.1      Albumin 2.3 (*)     Total Bilirubin 2.2 (*)     Alkaline Phosphatase 212 (*)     AST 22      ALT 18      eGFR >60      Anion Gap 13     LACTIC ACID, PLASMA - Abnormal    Lactate (Lactic Acid) 3.1 (*)    URINALYSIS, REFLEX TO URINE CULTURE - Abnormal    Specimen UA Urine, Clean Catch      Color, UA Yellow      Appearance, UA Clear      pH, UA 6.0      Specific Gravity, UA <=1.005 (*)     Protein, UA Negative      Glucose, UA Negative      Ketones, UA Negative      Bilirubin (UA) Negative      Occult Blood UA Negative      Nitrite, UA Negative      Urobilinogen, UA 1.0      Leukocytes, UA Negative      Narrative:     Specimen Source->Urine   PROTIME-INR - Abnormal    Prothrombin Time 13.5 (*)     INR 1.3 (*)    APTT - Abnormal    aPTT 32.8 (*)    CULTURE, BLOOD   CULTURE, BLOOD   LACTIC ACID, PLASMA    Lactate (Lactic Acid) 1.3     PROTIME-INR   APTT   TYPE & SCREEN    Group & Rh B POS      Indirect Ethan NEG      Specimen Outdate 10/10/2024 23:59     PREPARE RBC SOFT    UNIT NUMBER [id number]      Product Code W6069C17      DISPENSE STATUS ISSUED      CODING SYSTEM  [id number]      Unit Blood Type Code 7300      Unit Blood Type B POS      Unit Expiration [id number]      CROSSMATCH INTERPRETATION Compatible      UNIT NUMBER [id number]      Product Code D5453V94      DISPENSE STATUS ISSUED      CODING SYSTEM [id number]      Unit Blood Type Code 7300      Unit Blood Type B POS      Unit Expiration [id number]      CROSSMATCH INTERPRETATION Compatible     PREPARE RBC SOFT    UNIT NUMBER [id number]      Product Code Z7488X27      DISPENSE STATUS CROSSMATCHED      CODING SYSTEM WXFC231      Unit Blood Type Code 7300      Unit Blood Type B POS      Unit Expiration [id number]      CROSSMATCH INTERPRETATION Compatible      UNIT NUMBER [id number]      Product Code L1527E79      DISPENSE STATUS CROSSMATCHED      CODING SYSTEM BCHX752      Unit Blood Type Code 7300      Unit Blood Type B POS      Unit Expiration [id number]      CROSSMATCH INTERPRETATION Compatible     PREPARE FRESH FROZEN PLASMA SOFT    UNIT NUMBER [id number]      Product Code H7169T58      DISPENSE STATUS ISSUED      CODING SYSTEM VEZG990      Unit Blood Type Code 7300      Unit Blood Type B POS      Unit Expiration [id number]      CROSSMATCH INTERPRETATION Not Required      UNIT NUMBER [id number]      Product Code K4564B40      DISPENSE STATUS ISSUED      CODING SYSTEM VGIT496      Unit Blood Type Code 7300      Unit Blood Type B POS      Unit Expiration [id number]      CROSSMATCH INTERPRETATION Not Required            Imaging Results              CTA Abdomen and Pelvis (Final result)  Result time 10/07/24 13:16:39      Final result by Chuckie Jefferson MD (10/07/24 13:16:39)                   Impression:      1. Multilocular mixed density fluid collection with largest components adjacent to the right lobe of the liver and central mesentery, presumed hemorrhage, but bile possible.  2. Moderate pneumoperitoneum.  Correlate with time of surgery.  3. No evidence of aortic dissection or abdominal visceral  arterial stenosis.    COMMUNICATION  Findings discussed with Dr. Dawson at the time of dictation.      Electronically signed by: Chuckie Jefferson MD  Date:    10/07/2024  Time:    13:16               Narrative:    EXAMINATION:  CTA ABDOMEN AND PELVIS    CLINICAL HISTORY:  post op hemorrhage;    TECHNIQUE:  Thin section post IV contrast images were obtained through the abdominal arterial vasculature.  Multiplanar reformations were performed.  Images were reviewed at 3-D workstation as well.    CT/cardiac nuclear exam/s in prior 12 months:  3    COMPARISON:  CT abdomen and pelvis 09/20/2024.    FINDINGS:  Aorta: Normal caliber.  No aneurysm or dissection.    Celiac axis: Patent.    SMA: Patent.    Right renal: Patent.    Left renal: Patent.    ANGELIQUE: Patent.    Iliac system: Patent.    Multilocular mixed density fluid collection with largest components adjacent to the right lobe of the liver (17 cm), and central mesentery (12 cm).  No active extravasation detected.  Moderate free air within the anterior abdomen and abdominal wall.  Unremarkable liver and spleen.  Previous cholecystectomy.  Unremarkable pancreas and adrenal glands.  No renal mass or hydronephrosis.  No bowel obstruction.  Prior gastric surgery.                                       X-Ray Chest AP Portable (Final result)  Result time 10/07/24 11:26:34      Final result by Chuckie Jefferson MD (10/07/24 11:26:34)                   Impression:      Somewhat elevated right hemidiaphragm relative to 06/27/2024.      Electronically signed by: Chuckie Jefferson MD  Date:    10/07/2024  Time:    11:26               Narrative:    EXAMINATION:  XR CHEST AP PORTABLE    CLINICAL HISTORY:  Sepsis;    COMPARISON:  Chest x-ray 06/27/2024.    FINDINGS:  Unremarkable AP cardiac silhouette.  Somewhat elevated right hemidiaphragm relative the previous exam.  Left lung clear.  No pleural fluid.                                       Medications   0.9%  NaCl infusion (for blood  administration) (has no administration in time range)   0.9%  NaCl infusion (for blood administration) (has no administration in time range)   0.9%  NaCl infusion (for blood administration) (has no administration in time range)   NORepinephrine 4 mg in dextrose 5% 250 mL infusion (premix) (0.06 mcg/kg/min × 89.8 kg Intravenous Verify Only 10/7/24 1707)   sodium chloride 0.9% bolus 1,000 mL 1,000 mL (0 mLs Intravenous Stopped 10/7/24 1235)   sodium chloride 0.9% bolus 1,000 mL 1,000 mL (0 mLs Intravenous Stopped 10/7/24 1236)   piperacillin-tazobactam (ZOSYN) 4.5 g in D5W 100 mL IVPB (MB+) (0 g Intravenous Stopped 10/7/24 1202)   acetaminophen tablet 1,000 mg (1,000 mg Oral Given 10/7/24 1136)   vancomycin (VANCOCIN) 1,000 mg in D5W 250 mL IVPB (admixture device) (0 mg Intravenous Stopped 10/7/24 1424)     Followed by   vancomycin 750 mg in D5W 250 mL IVPB (admixture device) (0 mg Intravenous Stopped 10/7/24 1424)   iohexoL (OMNIPAQUE 350) injection 100 mL (100 mLs Intravenous Given 10/7/24 1249)   sodium chloride 0.9% bolus 1,000 mL 1,000 mL (0 mLs Intravenous Stopped 10/7/24 1456)   diazePAM tablet 2 mg (2 mg Oral Given 10/7/24 1322)   HYDROmorphone injection 0.5 mg (0.5 mg Intravenous Given 10/7/24 1437)   sodium chloride 0.9% bolus 1,000 mL 1,000 mL (0 mLs Intravenous Stopped 10/7/24 1726)     Medical Decision Making  This is an emergent evaluation of a 54y AAF post op from lap komal 9/20/24 presenting with diffuse abdominal pain for 2 days.  Exam she is febrile, tachycardia, diffuse abdominal tenderness with noted distension at laparoscopic sites.  Labs remarkable for leukocytosis 14, elevatedf lactic acid 3.1, H/H 7.8/21.4.  ,     CT shows large fluid collection likely blood vs bileoma  Will transfuse 2U RBC.    Spoke with Dr. Smith. Recommendations: Open laparoscopic incision site to allow fluid collection to drain.  Transfer out to facility with IR and hepatobiliary speciality.    Patient persistently  "hypotensive. Given 2U FFP.  Her hypotension continued, patient is septic shock.  Started levophed.  [facility]     DDX: post op complication, hematoma, biloma, abscess      Amount and/or Complexity of Data Reviewed  Labs: ordered.  Radiology: ordered.    Risk  OTC drugs.  Prescription drug management.      Additional MDM:   Sepsis:   This patient does have evidence of infective focus  My overall impression is septic shock due to MAP < 65 and SBP < 90.  Source: Abdominal  Antibiotics given- Antibiotics  Vanc & Zosyn   Patient Encounter Information Not Found      Latest lactate reviewed- 3.1  Organ dysfunction indicated by Acute liver injury, thrombocytosis    Fluid challenge Actual Body weight- Patient will receive 30ml/kg actual body weight to calculate fluid bolus for treatment of septic shock.     Post- resuscitation assessment Yes Perfusion exam was performed within 6 hours of septic shock presentation after bolus shows Adequate tissue perfusion assessed by non-invasive monitoring       Will Start Pressors- Levophed for MAP of 65  Source control achieved by: n/a                Sepsis Perfusion Assessment: "I attest a sepsis perfusion exam was performed within 6 hours of sepsis, severe sepsis, or septic shock presentation, following fluid resuscitation."          Medical Decision Making:   Clinical Tests:   Sepsis Perfusion Assessment: ""I attest a sepsis perfusion exam was performed within 6 hours of sepsis, severe sepsis, or septic shock presentation, following fluid resuscitation.""             Clinical Impression:  Final diagnoses:  [R00.0] Tachycardia  [A41.9] Sepsis, due to unspecified organism, unspecified whether acute organ dysfunction present (Primary)  [A41.9, R65.21] Septic shock  [T81.12XD] Postoperative septic shock, subsequent encounter          ED Disposition Condition    Transfer to Another Facility Stable                Usha Dawson MD  10/07/24 4424    "

## 2024-10-07 NOTE — PROVIDER TRANSFER
"   Outside Transfer Acceptance Note / [facility]erral [facility]    Referring facility: [facility]   Referring provider: TAMMY HACKETT  Accepting facility: [facility]  Accepting provider: RUBÉN BOWEN  Admitting provider: SICU  Reason for transfer:  interventional radiology and ICU  Transfer diagnosis: sepsis and intrabominal fluid collection  Transfer specialty requested: Interventional Radiology-[place]  Surgery ICU - Dr. Renee  Transfer specialty notified: Yes  Transfer level: NUMBER 1-5: 2  Bed type requested: ICU  Isolation status: No active isolations   Admission class or status: IP- Inpatient    Narrative   Patient is a 54y AAF presenting with abdominal pain and distension to ER today. She had a cholecystectomy by Dr. Smith on 9/20/24. States for the past 2 days she noticed an area protruding at one of the laprascopic sites. Endorses fatigue and malaise. Pain is rated 8/10, worse with movement and constant. CTA abdomen and pelvis showed "1. Multilocular mixed density fluid collection with largest components adjacent to the right lobe of the liver and central mesentery, presumed hemorrhage, but bile possible.  2. Moderate pneumoperitoneum.  Correlate with time of surgery.   3. No evidence of aortic dissection or abdominal visceral arterial stenosis." IR saw imaging and feels this is blood. She signs of sepsis including tachycardia, leukocytosis, but mentating fine. Given fluids, zosyn and vancomycin. Hgb 14->7.8    BP now 87/52, 106, 16 after 5 liters of of fluid    Objective     Vitals: Temp: 98.6 °F (37 °C) (10/07/24 1311)  Pulse: 106 (10/07/24 1322)  Resp: 16 (10/07/24 1312)  BP: (!) 87/52 (10/07/24 1322)  SpO2: 96 % (10/07/24 1322)  Recent Labs   Lab 10/07/24  1112   *   K 3.6   CL 91*   CO2 28   BUN 24*   CREATININE 1.1   *   CALCIUM 9.0     Recent Labs   Lab 10/07/24  1112   ALKPHOS 212*   ALT 18   AST 22   ALBUMIN 2.3*   PROT 7.1   BILITOT 2.2*       Recent " "Labs   Lab 10/07/24  1112   WBC 14.91*   HGB 7.8*   HCT 21.4*   PLT 1,015*   GRAN 46.0   LYMPH 29.0  CANCELED   MONO 2.0*  CANCELED   EOSINOPHIL 0.0       No results for input(s): "POCTGLUCOSE" in the last 168 hours.  Recent imaging:CTA Abdomen and Pelvis  Narrative: EXAMINATION:  CTA ABDOMEN AND PELVIS    CLINICAL HISTORY:  post op hemorrhage;    TECHNIQUE:  Thin section post IV contrast images were obtained through the abdominal arterial vasculature.  Multiplanar reformations were performed.  Images were reviewed at 3-D workstation as well.    CT/cardiac nuclear exam/s in prior 12 months:  3    COMPARISON:  CT abdomen and pelvis 09/20/2024.    FINDINGS:  Aorta: Normal caliber.  No aneurysm or dissection.    Celiac axis: Patent.    SMA: Patent.    Right renal: Patent.    Left renal: Patent.    ANGELIQUE: Patent.    Iliac system: Patent.    Multilocular mixed density fluid collection with largest components adjacent to the right lobe of the liver (17 cm), and central mesentery (12 cm).  No active extravasation detected.  Moderate free air within the anterior abdomen and abdominal wall.  Unremarkable liver and spleen.  Previous cholecystectomy.  Unremarkable pancreas and adrenal glands.  No renal mass or hydronephrosis.  No bowel obstruction.  Prior gastric surgery.  Impression: 1. Multilocular mixed density fluid collection with largest components adjacent to the right lobe of the liver and central mesentery, presumed hemorrhage, but bile possible.  2. Moderate pneumoperitoneum.  Correlate with time of surgery.  3. No evidence of aortic dissection or abdominal visceral arterial stenosis.    COMMUNICATION  Findings discussed with Dr. Dawson at the time of dictation.    Electronically signed by: Chuckie Jefferson MD  Date:    10/07/2024  Time:    13:16  X-Ray Chest AP Portable  Narrative: EXAMINATION:  XR CHEST AP PORTABLE    CLINICAL HISTORY:  Sepsis;    COMPARISON:  Chest x-ray 06/27/2024.    FINDINGS:  Unremarkable AP " cardiac silhouette.  Somewhat elevated right hemidiaphragm relative the previous exam.  Left lung clear.  No pleural fluid.  Impression: Somewhat elevated right hemidiaphragm relative to 06/27/2024.    Electronically signed by: hCuckie Jefferson MD  Date:    10/07/2024  Time:    11:26    Airway:     Vent settings:         IV access:        Peripheral IV - Single Lumen 10/07/24 1110 20 G Right Antecubital (Active)   Site Assessment Clean;Dry;Intact;No redness;No swelling;No warmth 10/07/24 1110   Line Status Blood return noted;Flushed;Saline locked 10/07/24 1110   Dressing Status Clean;Dry;Intact 10/07/24 1110            Peripheral IV - Single Lumen 10/07/24 1128 20 G Left Antecubital (Active)   Site Assessment Clean;Dry;Intact;No redness;No swelling;No warmth 10/07/24 1128   Line Status Blood return noted;Flushed;Saline locked 10/07/24 1128   Dressing Status Clean;Dry;Intact 10/07/24 1128     Infusions: NS  Allergies:   Review of patient's allergies indicates:   Allergen Reactions    Ozempic [semaglutide]      Chest tightness,visual disturbances, vomiting, headaches, fatigue    Metformin      History of acute renal failure and hemodialysis      NPO: No    Anticoagulation:   Anticoagulants       None             Instructions      Upon patient arrival to the ICU, please contact Critical Care Medicine on call.

## 2024-10-08 PROBLEM — A41.9 SEPTIC SHOCK: Status: ACTIVE | Noted: 2024-10-08

## 2024-10-08 PROBLEM — K66.8 PNEUMOPERITONEUM: Status: ACTIVE | Noted: 2024-10-08

## 2024-10-08 PROBLEM — F17.200 TOBACCO DEPENDENCY: Status: ACTIVE | Noted: 2024-10-08

## 2024-10-08 PROBLEM — R65.21 SEPTIC SHOCK: Status: ACTIVE | Noted: 2024-10-08

## 2024-10-08 LAB
OHS QRS DURATION: 80 MS
OHS QTC CALCULATION: 561 MS
PATH REV BLD -IMP: NORMAL

## 2024-10-09 LAB
BLD PROD TYP BPU: NORMAL
BLD PROD TYP BPU: NORMAL
BLOOD UNIT EXPIRATION DATE: NORMAL
BLOOD UNIT EXPIRATION DATE: NORMAL
BLOOD UNIT TYPE CODE: 7300
BLOOD UNIT TYPE CODE: 7300
BLOOD UNIT TYPE: NORMAL
BLOOD UNIT TYPE: NORMAL
CODING SYSTEM: NORMAL
CODING SYSTEM: NORMAL
CROSSMATCH INTERPRETATION: NORMAL
CROSSMATCH INTERPRETATION: NORMAL
DISPENSE STATUS: NORMAL
DISPENSE STATUS: NORMAL
NUM UNITS TRANS PACKED RBC: NORMAL
NUM UNITS TRANS PACKED RBC: NORMAL

## 2024-10-12 LAB
BACTERIA BLD CULT: NORMAL
BACTERIA BLD CULT: NORMAL

## 2024-10-29 PROBLEM — M79.2 NEUROPATHIC PAIN OF CHEST: Status: RESOLVED | Noted: 2017-06-14 | Resolved: 2024-10-29

## 2024-10-29 PROBLEM — J92.9 PLEURAL THICKENING: Status: RESOLVED | Noted: 2017-06-14 | Resolved: 2024-10-29

## 2024-10-29 PROBLEM — E88.810 METABOLIC SYNDROME: Status: RESOLVED | Noted: 2023-09-06 | Resolved: 2024-10-29

## 2024-10-29 PROBLEM — I87.2 EDEMA OF BOTH LOWER EXTREMITIES DUE TO PERIPHERAL VENOUS INSUFFICIENCY: Status: ACTIVE | Noted: 2024-10-29

## 2024-10-29 PROBLEM — Z87.891 FORMER CIGARETTE SMOKER: Status: ACTIVE | Noted: 2024-10-08

## 2024-10-29 PROBLEM — R07.81 PLEURITIC CHEST PAIN: Status: RESOLVED | Noted: 2017-06-14 | Resolved: 2024-10-29

## 2024-11-06 PROBLEM — D47.3 ESSENTIAL THROMBOCYTOSIS: Status: ACTIVE | Noted: 2024-11-06

## 2024-11-08 ENCOUNTER — EXTERNAL HOME HEALTH (OUTPATIENT)
Dept: HOME HEALTH SERVICES | Facility: HOSPITAL | Age: 54
End: 2024-11-08
Payer: MEDICARE

## 2024-11-12 PROBLEM — A41.9 SEPTIC SHOCK: Status: RESOLVED | Noted: 2024-10-08 | Resolved: 2024-11-12

## 2024-11-12 PROBLEM — R65.21 SEPTIC SHOCK: Status: RESOLVED | Noted: 2024-10-08 | Resolved: 2024-11-12

## 2024-11-12 PROBLEM — N95.1 PERIMENOPAUSAL VASOMOTOR SYMPTOMS: Chronic | Status: ACTIVE | Noted: 2019-06-19

## 2024-11-12 PROBLEM — K81.0 ACUTE CHOLECYSTITIS: Status: RESOLVED | Noted: 2024-09-20 | Resolved: 2024-11-12

## 2024-11-12 PROBLEM — G89.4 CHRONIC PAIN SYNDROME: Status: RESOLVED | Noted: 2019-06-19 | Resolved: 2024-11-12

## 2024-11-12 PROBLEM — K22.4 ESOPHAGEAL SPASM: Status: RESOLVED | Noted: 2018-02-15 | Resolved: 2024-11-12

## 2024-11-12 PROBLEM — T81.9XXA POST-OPERATIVE COMPLICATION: Status: RESOLVED | Noted: 2024-10-07 | Resolved: 2024-11-12

## 2024-11-12 PROBLEM — K66.8 PNEUMOPERITONEUM: Status: RESOLVED | Noted: 2024-10-08 | Resolved: 2024-11-12

## 2024-11-13 PROBLEM — J90 PLEURAL EFFUSION, BILATERAL: Status: ACTIVE | Noted: 2024-11-13

## 2024-12-13 PROBLEM — R91.1 SOLITARY PULMONARY NODULE: Status: ACTIVE | Noted: 2024-12-13

## 2024-12-13 PROBLEM — F17.211 NICOTINE DEPENDENCE, CIGARETTES, IN REMISSION: Status: ACTIVE | Noted: 2024-10-08

## 2024-12-18 PROBLEM — J90 PLEURAL EFFUSION, BILATERAL: Status: RESOLVED | Noted: 2024-11-13 | Resolved: 2024-12-18

## 2024-12-18 PROBLEM — F43.0 ACUTE STRESS REACTION: Status: RESOLVED | Noted: 2020-11-04 | Resolved: 2024-12-18

## 2024-12-18 PROBLEM — R45.89 ANXIETY ABOUT HEALTH: Status: RESOLVED | Noted: 2024-05-03 | Resolved: 2024-12-18

## 2025-03-02 ENCOUNTER — HOSPITAL ENCOUNTER (EMERGENCY)
Facility: HOSPITAL | Age: 55
Discharge: HOME | End: 2025-03-02
Payer: MEDICARE

## 2025-03-02 VITALS
OXYGEN SATURATION: 96 % | TEMPERATURE: 99 F | DIASTOLIC BLOOD PRESSURE: 81 MMHG | WEIGHT: 221 LBS | SYSTOLIC BLOOD PRESSURE: 124 MMHG | BODY MASS INDEX: 33.49 KG/M2 | HEIGHT: 68 IN | HEART RATE: 103 BPM | RESPIRATION RATE: 18 BRPM

## 2025-03-02 DIAGNOSIS — R42 EPISODIC LIGHTHEADEDNESS: Primary | ICD-10-CM

## 2025-03-02 LAB — POCT GLUCOSE: 82 MG/DL (ref 70–110)

## 2025-03-02 PROCEDURE — 82962 GLUCOSE BLOOD TEST: CPT

## 2025-03-02 PROCEDURE — 99282 EMERGENCY DEPT VISIT SF MDM: CPT

## 2025-03-03 PROBLEM — Z98.890 S/P EXPLORATORY LAPAROTOMY: Status: ACTIVE | Noted: 2025-03-03

## 2025-03-03 NOTE — DISCHARGE INSTRUCTIONS
Please return to the Emergency Department immediately for any new or concerning symptoms or if they get worse.   If you were prescribed antibiotics, please take them to completion.   If you were prescribed a narcotic medication, do not drive or operate heavy equipment or machinery, while taking these medications.  Please follow up with your primary care doctor or specialist in one week.  If you smoke, please stop smoking.    If you do not have a doctor, you can go to the website [address] [place], [place] [email or web address] look up where there is a primary care clinic close to you.    Our goal in the emergency department is to always give you outstanding care and exceptional service. You may receive a survey by mail or e-mail in the next week regarding your experience in our ED. We would greatly appreciate your completing and returning the survey. Your feedback provides us with a way to recognize our staff who give very good care and it helps us learn how to improve when your experience was below our aspiration of excellence.

## 2025-03-03 NOTE — ED PROVIDER NOTES
Encounter Date: 3/2/2025       History     Chief Complaint   Patient presents with    Fatigue     Pt to ED with taking antihypertensive Rx at 6 pm, and felt flushed, dizzy, and generalized weakness afterwards. Denies any symptoms at this time.      Patient is a 55 year old female with PMHx of HTN, HLD, COPD, MICHELINE on CPAP, hypothyroidism, fibromyalgia, PCOS, PTSD, anxiety, and depression. She presents to the ED for lightheadedness. She reports having episodic lightheadedness onset tonight at 6:00PM when transitioning from lying to standing position. Denies head trauma. Denies LOC. Denies hx of anticoagulation use. Denies symptoms at present. She denies fever,chills, nausea, vomiting, sob, chest pain, abd pain, dysuria, diarrhea, or constipation. She is a former smoker and denies alcohol use.    The history is provided by the patient and medical records. No  was used.     Review of patient's allergies indicates:   Allergen Reactions    Ozempic [semaglutide]      Chest tightness,visual disturbances, vomiting, headaches, fatigue    Metformin      History of acute renal failure and hemodialysis     Past Medical History:   Diagnosis Date    Abnormal Pap smear of cervix     Acute stress reaction 11/04/2020    Adjustment insomnia 06/19/2019    SUZIE (acute kidney injury) 12/2016    after first gastric sleeve  had to be dialyzed    Bariatric surgery status     Gastric sleeve    Chronic anxiety     COPD (chronic obstructive pulmonary disease)     COVID-19 01/24/2022    COVID-19 virus infection 01/12/2024    Degenerative scoliosis in adult patient     Edema of both lower extremities due to peripheral venous insufficiency 10/29/2024    Wear compression socks/stockings during the day.    Low-sodium diet.    Avoid sitting for longer than 2 hours with legs hanging.  Weight loss advised.         Esophageal spasm 02/15/2018    Essential (primary) hypertension     Fibromyalgia     History of acute renal failure  04/19/2024    After gastric sleeve surgery; required hemodialysis      Hyperlipidemia     Hypothyroidism     JANETTE (iron deficiency anemia)     Inflammatory osteoarthritis     MDD (major depressive disorder), recurrent episode, mild 12/07/2020    Mixed common migraine and muscle contraction headache 11/04/2020    Muscle spasm     OCD (obsessive compulsive disorder)     MICHELINE on CPAP     3cm     PCOS (polycystic ovarian syndrome)     Personal history of colonic polyps 11/09/2023    Pleuritic chest pain     Prediabetes     PTSD (post-traumatic stress disorder) 11/04/2020    Rectocele     Sickle cell trait     Traumatic perforation of esophagus 11/2015    During original Gastric Sleeve surgery     Past Surgical History:   Procedure Laterality Date    ABSCESS DRAINAGE N/A 10/08/2024    Procedure: DRAINAGE, ABSCESS;  Surgeon: Ezio Smith MD;  Location: [facility] OR;  Service: General;  Laterality: N/A;  Evacuation of intra-abdominal abscess    BARIATRIC SURGERY  12/2016    Dr Estevez  Gastric sleeve, max weight 363    BLADDER SUSPENSION  2013    Lafaynabila    CARPAL TUNNEL RELEASE Right 10/2022    Dr. Bear, and had injected    CHOLECYSTECTOMY      COLONOSCOPY  11/09/2023    Tay Davies MD    EPIDURAL STEROID INJECTION INTO LUMBAR SPINE  2016    Hipolito    KNEE ARTHROSCOPY  03/04/2020    KNEE ARTHROSCOPY W/ MENISCECTOMY Right 11/2014    ANAND Jenkins osteochondroma    LAPAROSCOPIC CHOLECYSTECTOMY N/A 09/20/2024    Procedure: CHOLECYSTECTOMY, LAPAROSCOPIC;  Surgeon: Ezio Smith MD;  Location: [facility] OR;  Service: General;  Laterality: N/A;    LAPAROSCOPIC REVISION OF NOEMI-EN-Y GASTROENTEROSTOMY N/A 11/08/2018    Procedure: REVISION, GASTROENTEROSTOMY, NOEMI-EN-Y, LAPAROSCOPIC;  Surgeon: Vitaliy Hoskins Jr., MD;  Location: [facility] OR [address] [place] [address];  Service: General;  Laterality: N/A;    LAPAROTOMY, EXPLORATORY N/A 10/08/2024    Procedure: LAPAROTOMY, EXPLORATORY;  Surgeon: Ezio Smith MD;  Location: [facility] OR;  Service: General;   Laterality: N/A;    REPAIR, ENTEROTOMY N/A 10/08/2024    Procedure: REPAIR, ENTEROTOMY;  Surgeon: Ezio Smith MD;  Location: [facility] OR;  Service: General;  Laterality: N/A;    SLEEVE GASTROPLASTY      TONSILLECTOMY      as child    TOTAL ABDOMINAL HYSTERECTOMY W/ BILATERAL SALPINGOOPHORECTOMY  2013    Good    TOTAL KNEE ARTHROPLASTY Right 03/2020    P. Ellender    TUBAL LIGATION      WISDOM TOOTH EXTRACTION       Family History   Problem Relation Name Age of Onset    Hypertension Mother Jennifer     Rheum arthritis Mother Jennifer     Alcohol abuse Mother Jennifer     Mental illness Father      COPD Father      Lung cancer Father          mesothelioma    No Known Problems Sister      No Known Problems Brother      Katie Parkinson White syndrome Grandchild Roxana Radha     Mitochondrial disorder Grandchild Roxana Radha     Breast cancer Maternal Aunt      Breast cancer Maternal Aunt      Breast cancer Maternal Aunt      Ovarian cancer Maternal Cousin      Breast cancer Paternal Aunt      Colon cancer Neg Hx       Social History[1]  Review of Systems   Constitutional:  Negative for fever.   HENT:  Negative for sore throat.    Respiratory:  Negative for shortness of breath.    Cardiovascular:  Negative for chest pain.   Gastrointestinal:  Negative for abdominal pain, nausea and vomiting.   Genitourinary:  Negative for dysuria.   Musculoskeletal:  Negative for back pain.   Skin:  Negative for rash.   Neurological:  Positive for light-headedness. Negative for weakness.   Hematological:  Does not bruise/bleed easily.       Physical Exam     Initial Vitals [03/02/25 1904]   BP Pulse Resp Temp SpO2   117/60 94 18 98.6 °F (37 °C) 96 %      MAP       --         Physical Exam    Vitals reviewed.  Constitutional: She appears well-developed and well-nourished. No distress.   HENT:   Head: Normocephalic.   Eyes: Conjunctivae and EOM are normal.   Neck:   Normal range of motion.  Cardiovascular:  Normal rate and regular rhythm.            No murmur heard.  Pulmonary/Chest: Breath sounds normal. No respiratory distress. She has no wheezes. She has no rales.   Musculoskeletal:         General: Normal range of motion.      Cervical back: Normal range of motion.     Neurological: She is alert and oriented to person, place, and time. She has normal strength. Gait normal.   Skin: Skin is warm and dry.         ED Course   Procedures  Labs Reviewed   POCT GLUCOSE       Result Value    POCT Glucose 82     POCT GLUCOSE MONITORING CONTINUOUS             Medications - No data to display  Medical Decision Making  Problems Addressed:  Episodic lightheadedness: acute illness or injury    Amount and/or Complexity of Data Reviewed  Labs: ordered.    Risk  OTC drugs.         APC / Resident Notes:   Patient is a 55 year old female who presents to the ED for emergent evaluation of episodic lightheadedness.     Will order lab. Will continue to monitor.     Differential diagnoses include, but are not limited to: orthostatic hypotension, hypoglycemia/hyperglycemia, dehydration, or viral syndrome     Orthostatic VS WNL   POCT glucose WNL     Patient reassessed, reports symptoms improved with ED management. I have discussed emergency department findings, and plan with the patient. Will discharge home with F/U with PCP in approximately one week. Additional verbal discharge instructions were given and discussed with the patient. Patient verbalizes understanding of plan and agrees. Return precautions given.                       Clinical Impression:  Final diagnoses:  [R42] Episodic lightheadedness (Primary)          ED Disposition Condition    Discharge Stable          ED Prescriptions    None       Follow-up Information       Follow up With Specialties Details Why Contact Claritza Singh NP Internal Medicine Schedule an appointment as soon as possible for a visit in 1 week  [address] [address]  [phone]                 [1]   Social  History  Tobacco Use    Smoking status: Former     Average packs/day: 0.3 packs/day for 23.8 years (5.9 ttl pk-yrs)     Types: Cigarettes     Start date: 2001    Smokeless tobacco: Never    Tobacco comments:     hasn't smoke since    Substance Use Topics    Alcohol use: No    Drug use: Yes     Types: Hydrocodone        Liza Morales PAAdelaC  03/02/25 1927

## 2025-03-13 ENCOUNTER — PATIENT OUTREACH (OUTPATIENT)
Facility: OTHER | Age: 55
End: 2025-03-13
Payer: MEDICARE

## 2025-03-13 NOTE — PROGRESS NOTES
Sanjuana Guillermo, Patient Care Assistant  ED Navigator  Emergency Department    Project: [facility] ED Navigator  Role: Community Health Worker    Date: 03/13/2025  Patient Name: Karine Nayak  MRN: [id number]  PCP: Claritza Chase NP    Assessment:     Karine Nayak is a 55 y.o. female who has presented to ED for fatigue. Patient has visited the ED 2 times in the past 3 months. Patient did contact PCP.     ED Navigator Initial Assessment    ED Navigator Enrollment Documentation  Consent to Services  Does patient consent to completing the assessment?: Yes  Contact  Method of Initial Contact: Phone  Transportation  Insurance Coverage  Do you have coverage/adequate coverage?: Yes  Specialist Appointment  Did the patient come to the ED to see a specialist?: No  Does the patient have a pending specialist referral?: No  Does the patient have a specialist appointment made?: No  PCP Follow Up Appointment  Medications  Is patient able to afford medication?: Yes  Psychological  Food  Communication/Education  Other Financial Concerns  Other Social Barriers/Concerns  Primary Barrier  Plan: Provided information for [facility] 24/7 Nurse triage line, [phone] or [phone] ([phone])         Social History     Socioeconomic History    Marital status: Legally      Spouse name: Jimbo    Number of children: 2    Highest education level: Associate degree: occupational, technical, or vocational program   Occupational History    Occupation: Disabled CNA     Employer: Disabled   Tobacco Use    Smoking status: Former     Average packs/day: 0.3 packs/day for 23.8 years (5.9 ttl pk-yrs)     Types: Cigarettes     Start date: 2001    Smokeless tobacco: Never    Tobacco comments:     hasn't smoke since    Substance and Sexual Activity    Alcohol use: No    Drug use: Yes     Types: Hydrocodone    Sexual activity: Yes     Partners: Male     Birth control/protection: See Surgical Hx     Comment: s/p hysterectomy   Social  History Narrative    .  Disability due to fibromyalgia.   [place]  2 adult children (college).       Social Drivers of Health     Financial Resource Strain: Low Risk  (3/13/2025)    Overall Financial Resource Strain (CARDIA)     Difficulty of Paying Living Expenses: Not hard at all   Food Insecurity: No Food Insecurity (3/13/2025)    Hunger Vital Sign     Worried About Running Out of Food in the Last Year: Never true     Ran Out of Food in the Last Year: Never true   Transportation Needs: No Transportation Needs (3/13/2025)    PRAPARE - Transportation     Lack of Transportation (Medical): No     Lack of Transportation (Non-Medical): No   Physical Activity: Insufficiently Active (3/13/2025)    Exercise Vital Sign     Days of Exercise per Week: 3 days     Minutes of Exercise per Session: 30 min   Stress: Stress Concern Present (3/13/2025)    [employer] - Occupational Stress Questionnaire     Feeling of Stress : To some extent   Housing Stability: Low Risk  (3/13/2025)    Housing Stability Vital Sign     Unable to Pay for Housing in the Last Year: No     Number of Times Moved in the Last Year: 1     Homeless in the Last Year: No       Plan:       Pt is doing okay at this time. Pt expressed she figured out her BP medications is what ended up bringing her into the ER. Pt has spoken to her PCP since, and also has an appointment with her next week. Pt did not need any other appointments scheduled. Pt did not need any resources at this time. ED navigator reminded patient to reach out if there is ever anything she can assist with.  ED navigator plans to follow-up with patient on/around 4/10/2025.    Sanjuana Guillermo  ED Navigator  [phone]

## 2025-04-10 ENCOUNTER — PATIENT OUTREACH (OUTPATIENT)
Facility: OTHER | Age: 55
End: 2025-04-10
Payer: MEDICARE

## 2025-04-10 NOTE — PROGRESS NOTES
Pt is doing fine. Pt has an upcoming PCP appt. Pt has no needs today. ED navigator reminded patient to reach out if there is ever anything she can assist with. ED navigator plans to follow-up with patient on/around 5/29/2025.    Sanjuana Guillermo  ED Navigator  [phone]

## 2025-04-23 PROBLEM — Z98.890 S/P EXPLORATORY LAPAROTOMY: Status: RESOLVED | Noted: 2025-03-03 | Resolved: 2025-04-23

## 2025-05-22 PROBLEM — K43.2 INCISIONAL HERNIA, WITHOUT OBSTRUCTION OR GANGRENE: Status: ACTIVE | Noted: 2025-05-22

## 2025-05-29 ENCOUNTER — PATIENT OUTREACH (OUTPATIENT)
Facility: OTHER | Age: 55
End: 2025-05-29
Payer: MEDICARE

## 2025-06-30 ENCOUNTER — PATIENT OUTREACH (OUTPATIENT)
Facility: OTHER | Age: 55
End: 2025-06-30
Payer: MEDICARE

## 2025-06-30 NOTE — PROGRESS NOTES
Pt is unreachable for follow-up call. ED navigator plans to follow-up with patient again on/around 7/22/2025.    Sanjuana Guillermo  ED Navigator  [phone]

## 2025-07-22 ENCOUNTER — PATIENT OUTREACH (OUTPATIENT)
Facility: OTHER | Age: 55
End: 2025-07-22
Payer: MEDICARE

## 2025-07-22 NOTE — PROGRESS NOTES
Pt is unreachable for follow-up call. ED navigator plans to follow-up with patient again on/around 9/2/2025.    Sanjuana Guillermo  ED Navigator  [phone]

## (undated) DEVICE — SCISSOR 5MMX35CM DIRECT DRIVE

## (undated) DEVICE — LUBRICANT SURGILUBE 2 OZ

## (undated) DEVICE — SEE MEDLINE ITEM 156902

## (undated) DEVICE — SYR 10CC LUER LOCK

## (undated) DEVICE — TROCAR KII FIOS ZTHREAD 11X100

## (undated) DEVICE — APPLICATOR CHLORAPREP ORN 26ML

## (undated) DEVICE — DRAPE LAPSCP CHOLE 122X102X78

## (undated) DEVICE — SHEARS HARMONIC 5CM 36CM

## (undated) DEVICE — ADHESIVE MASTISOL VIAL 48/BX

## (undated) DEVICE — RELOAD ECHELON FLEX WHT 60MM

## (undated) DEVICE — ENDOSTITCH INSTRUMENT

## (undated) DEVICE — TROCAR ENDOPATH XCEL 12MM 10CM

## (undated) DEVICE — SEE MEDLINE ITEM 152487

## (undated) DEVICE — ENDOSTITCH POLYSORB 2-0 ES-9

## (undated) DEVICE — Device

## (undated) DEVICE — SOL NS 1000CC

## (undated) DEVICE — SUT SURGIDAC ENDO STITCH2-0

## (undated) DEVICE — CANNULA LAP SEAL Z THRD 5X100

## (undated) DEVICE — SUT 0 VICRYL / UR6 (J603)

## (undated) DEVICE — APPLIER CLIP EPIX UNIV 5X34

## (undated) DEVICE — TUBING LAPARSCOPIC INSUFFLATIN

## (undated) DEVICE — BAG TISS RETRV MONARCH 10MM

## (undated) DEVICE — IRRIGATOR ENDOSCOPY DISP.

## (undated) DEVICE — SUT MCRYL PLUS 4-0 PS2 27IN

## (undated) DEVICE — HEMOSTAT SURGICEL 4X8IN

## (undated) DEVICE — CANNULA ENDOPATH XCEL 5X100MM

## (undated) DEVICE — NDL HYPO REG 25G X 1 1/2

## (undated) DEVICE — STRIP STERI REIN CLSR 1/2X2IN

## (undated) DEVICE — TROCAR ENDOPATH XCEL 5X100MM

## (undated) DEVICE — ELECTRODE REM PLYHSV RETURN 9

## (undated) DEVICE — DISSECTOR EPIX LAPA 5MMX35CM

## (undated) DEVICE — TROCAR ENDOPATH XCEL 12X100MM

## (undated) DEVICE — ELECTRODE ELECSURG L-HK 32CM

## (undated) DEVICE — TROCAR ENDO Z THREAD KII 5X100

## (undated) DEVICE — HANDLE CTRL PSTL GRIP E/S S/I

## (undated) DEVICE — NDL ECLIPSE SAF REG 25GX1.5IN

## (undated) DEVICE — TUBING HF INSUFFLATION W/ FLTR

## (undated) DEVICE — DRAPE ABDOMINAL TIBURON 14X11

## (undated) DEVICE — SEE MEDLINE ITEM 157117

## (undated) DEVICE — RELOAD ECHELON FLEX BLU 60MM

## (undated) DEVICE — STAPLER ECHELON FLEX 60MM 44CM

## (undated) DEVICE — NDL ECLIPSE SAFETY 18GX1-1/2IN

## (undated) DEVICE — KIT ANTIFOG

## (undated) DEVICE — ANVIL ACCESSORY 25MM DST

## (undated) DEVICE — SUT MONOCYRL 4-0 PS2 UND